# Patient Record
Sex: FEMALE | Race: WHITE | NOT HISPANIC OR LATINO | Employment: UNEMPLOYED | URBAN - METROPOLITAN AREA
[De-identification: names, ages, dates, MRNs, and addresses within clinical notes are randomized per-mention and may not be internally consistent; named-entity substitution may affect disease eponyms.]

---

## 2017-06-05 ENCOUNTER — TELEPHONE (OUTPATIENT)
Dept: OBSTETRICS AND GYNECOLOGY | Facility: CLINIC | Age: 42
End: 2017-06-05

## 2017-06-05 DIAGNOSIS — Z12.31 SCREENING MAMMOGRAM, ENCOUNTER FOR: Primary | ICD-10-CM

## 2017-06-05 NOTE — TELEPHONE ENCOUNTER
Scheduled WWE for 8/24/2014 at [facility], [place] with Dr. Cárdenas at 11:00 AM and a screening MMG for 12:00 PM at [facility], [place]. Pt communicated understanding.

## 2017-06-05 NOTE — TELEPHONE ENCOUNTER
----- Message from Carina Malagon sent at 6/5/2017  1:41 PM CDT -----  Contact: FLORIN VELIZ [[id number]]  x_  1st Request  _  2nd Request  _  3rd Request        Who: FLORIN VELIZ [[id number]]    Why: Pt states she would like to get her MMG orders in     What Number to Call Back: [phone]    When to Expect a call back: (Before the end of the day)   -- if call after 3:00 call back will be tomorrow.

## 2017-08-24 ENCOUNTER — OFFICE VISIT (OUTPATIENT)
Dept: OBSTETRICS AND GYNECOLOGY | Facility: CLINIC | Age: 42
End: 2017-08-24
Attending: OBSTETRICS & GYNECOLOGY
Payer: COMMERCIAL

## 2017-08-24 VITALS
SYSTOLIC BLOOD PRESSURE: 120 MMHG | BODY MASS INDEX: 34.8 KG/M2 | WEIGHT: 184.31 LBS | HEIGHT: 61 IN | DIASTOLIC BLOOD PRESSURE: 76 MMHG

## 2017-08-24 DIAGNOSIS — Z12.31 SCREENING MAMMOGRAM FOR HIGH-RISK PATIENT: ICD-10-CM

## 2017-08-24 DIAGNOSIS — Z01.419 ENCOUNTER FOR GYNECOLOGICAL EXAMINATION WITHOUT ABNORMAL FINDING: Primary | ICD-10-CM

## 2017-08-24 PROCEDURE — 99999 PR PBB SHADOW E&M-EST. PATIENT-LVL III: CPT | Mod: PBBFAC,,, | Performed by: OBSTETRICS & GYNECOLOGY

## 2017-08-24 PROCEDURE — 99396 PREV VISIT EST AGE 40-64: CPT | Mod: S$GLB,,, | Performed by: OBSTETRICS & GYNECOLOGY

## 2017-08-24 NOTE — PROGRESS NOTES
"  Subjective:       Patient ID: Vera De Leon is a 42 y.o. female.    Chief Complaint:  Well Woman      History of Present Illness  HPI    Vera De Leon is a 42 y.o. female  here for her annual GYN exam.    She describes her periods as regular, normal flow, lasting 4 days.   Denies break through bleeding.   Denies vaginal itching or irritation.  Denies vaginal discharge.  She is sexually active. She has had 1 partner for 20 years .  She uses bilateral tubal ligation for contraception.   History of abnormal pap: No  Last Pap: approximate date  and was normal  Last MMG: normal--routine follow-up in 12 months  Last Colonoscopy:  None  Denies domestic violence. She does feel safe at home.     Past Medical History:   Diagnosis Date    Chronic kidney disease     kidney stones     Past Surgical History:   Procedure Laterality Date    Breast reduction Bilateral      SECTION  1999; 3/2005    TUBAL LIGATION  3/2005     Social History     Social History    Marital status:      Spouse name: N/A    Number of children: N/A    Years of education: N/A     Occupational History    Not on file.     Social History Main Topics    Smoking status: Never Smoker    Smokeless tobacco: Never Used    Alcohol use No    Drug use: No      Comment: Tubal ligation    Sexual activity: Yes     Partners: Male     Birth control/ protection: Surgical      Comment:  x 20 years since       Other Topics Concern    Not on file     Social History Narrative    No narrative on file     Family History   Problem Relation Age of Onset    Hypertension Mother     Alzheimer's disease Paternal Grandmother     Breast cancer Neg Hx     Colon cancer Neg Hx     Ovarian cancer Neg Hx     Diabetes Neg Hx      OB History      Para Term  AB Living    2 2 2     2    SAB TAB Ectopic Multiple Live Births            2          /76   Ht 5' 1" (1.549 m)   Wt 83.6 kg (184 lb 4.9 oz)   " LMP 08/15/2017 (Exact Date)   BMI 34.82 kg/m²         GYN & OB History  Patient's last menstrual period was 08/15/2017 (exact date).   Date of Last Pap: 2015    OB History    Para Term  AB Living   2 2 2     2   SAB TAB Ectopic Multiple Live Births           2      # Outcome Date GA Lbr Robert/2nd Weight Sex Delivery Anes PTL Lv   2 Term 05 40w0d  3.374 kg (7 lb 7 oz) F CS-LTranv EPI N CARI   1 Term 99 41w0d  4.082 kg (9 lb) F CS-LTranv EPI N CARI          Review of Systems  Review of Systems   Constitutional: Negative for activity change, appetite change, fatigue and unexpected weight change.   HENT: Negative.    Eyes: Negative for visual disturbance.   Respiratory: Negative for shortness of breath and wheezing.    Cardiovascular: Negative for chest pain, palpitations and leg swelling.   Gastrointestinal: Negative for abdominal pain, bloating and blood in stool.   Endocrine: Negative for diabetes and hair loss.   Genitourinary: Negative for decreased libido, dyspareunia, menorrhagia and menstrual problem.   Musculoskeletal: Negative for back pain and joint swelling.   Skin:  Negative for no acne and hair changes.   Neurological: Negative for headaches.   Hematological: Does not bruise/bleed easily.   Psychiatric/Behavioral: Negative for depression and sleep disturbance. The patient is not nervous/anxious.    Breast: Negative for breast pain and nipple discharge          Objective:    Physical Exam:   Constitutional: She is oriented to person, place, and time. She appears well-developed and well-nourished.    HENT:   Head: Normocephalic and atraumatic.    Eyes: EOM are normal. Pupils are equal, round, and reactive to light.    Neck: Normal range of motion. Neck supple.    Cardiovascular: Normal rate and regular rhythm.     Pulmonary/Chest: Effort normal and breath sounds normal.   BREASTS: Symmetrical, no skin changes or visible lesions.  No palpable masses, nipple discharge bilaterally.           Abdominal: Soft. Bowel sounds are normal.     Genitourinary: Pelvic exam was performed with patient supine.   Genitourinary Comments: PELVIC: Normal external genitalia without lesions.  Normal hair distribution.  Adequate perineal body, normal urethral meatus.  Vagina moist and well rugated without lesions or discharge.  Cervix pink, without lesions, discharge or tenderness.  No significant cystocele or rectocele.  Bimanual exam shows uterus to be normal size, regular, mobile and nontender.  Adnexa without masses or tenderness.               Musculoskeletal: Normal range of motion and moves all extremeties.       Neurological: She is alert and oriented to person, place, and time.    Skin: Skin is warm and dry.    Psychiatric: She has a normal mood and affect.          Assessment:        1. Encounter for gynecological examination without abnormal finding    2. Screening mammogram for high-risk patient                Plan:        1. Encounter for gynecological examination without abnormal finding  COUNSELING:  The patient was counseled today on osteoporosis prevention, calcium supplementation, and regular weight bearing exercise. The patient was also counseled today on ACS PAP guidelines, with recommendations for yearly pelvic exams unless their uterus, cervix, and ovaries were removed for benign reasons; in that case, examinations every 3-5 years are recommended. The patient was also counseled regarding monthly breast self-examination, routine STD screening for at-risk populations, prophylactic immunizations for transmitted infections such as HPV, Pertussis, or Influenza as appropriate, and yearly mammograms when indicated by ACS guidelines. She was advised to see her primary care physician for all other health maintenance.   FOLLOW-UP with me for next routine visit.         2. Screening mammogram for high-risk patient  - Mammo Digital Screening Bilat with Tomosynthesis CAD; Future       Return in about 1 year  (around 8/24/2018).

## 2017-09-05 ENCOUNTER — HOSPITAL ENCOUNTER (OUTPATIENT)
Dept: RADIOLOGY | Facility: HOSPITAL | Age: 42
Discharge: HOME | End: 2017-09-05
Attending: OBSTETRICS & GYNECOLOGY
Payer: COMMERCIAL

## 2017-09-05 VITALS — WEIGHT: 184 LBS | BODY MASS INDEX: 34.74 KG/M2 | HEIGHT: 61 IN

## 2017-09-05 DIAGNOSIS — Z12.31 SCREENING MAMMOGRAM, ENCOUNTER FOR: ICD-10-CM

## 2017-09-05 PROCEDURE — 77067 SCR MAMMO BI INCL CAD: CPT | Mod: TC

## 2017-09-05 PROCEDURE — 77063 BREAST TOMOSYNTHESIS BI: CPT | Mod: 26,,, | Performed by: RADIOLOGY

## 2017-09-05 PROCEDURE — 77067 SCR MAMMO BI INCL CAD: CPT | Mod: 26,,, | Performed by: RADIOLOGY

## 2018-09-06 ENCOUNTER — OFFICE VISIT (OUTPATIENT)
Dept: OBSTETRICS AND GYNECOLOGY | Facility: CLINIC | Age: 43
End: 2018-09-06
Attending: OBSTETRICS & GYNECOLOGY
Payer: COMMERCIAL

## 2018-09-06 ENCOUNTER — HOSPITAL ENCOUNTER (OUTPATIENT)
Dept: RADIOLOGY | Facility: OTHER | Age: 43
Discharge: HOME | End: 2018-09-06
Attending: OBSTETRICS & GYNECOLOGY
Payer: COMMERCIAL

## 2018-09-06 VITALS
BODY MASS INDEX: 33.59 KG/M2 | WEIGHT: 177.94 LBS | SYSTOLIC BLOOD PRESSURE: 118 MMHG | DIASTOLIC BLOOD PRESSURE: 78 MMHG | HEIGHT: 61 IN

## 2018-09-06 DIAGNOSIS — Z12.31 SCREENING MAMMOGRAM, ENCOUNTER FOR: ICD-10-CM

## 2018-09-06 DIAGNOSIS — Z12.4 PAP SMEAR FOR CERVICAL CANCER SCREENING: ICD-10-CM

## 2018-09-06 DIAGNOSIS — Z01.419 ENCOUNTER FOR GYNECOLOGICAL EXAMINATION WITHOUT ABNORMAL FINDING: Primary | ICD-10-CM

## 2018-09-06 DIAGNOSIS — Z12.31 ENCOUNTER FOR SCREENING MAMMOGRAM FOR HIGH-RISK PATIENT: ICD-10-CM

## 2018-09-06 PROCEDURE — 99396 PREV VISIT EST AGE 40-64: CPT | Mod: S$GLB,,, | Performed by: OBSTETRICS & GYNECOLOGY

## 2018-09-06 PROCEDURE — 88175 CYTOPATH C/V AUTO FLUID REDO: CPT

## 2018-09-06 PROCEDURE — 99999 PR PBB SHADOW E&M-EST. PATIENT-LVL III: CPT | Mod: PBBFAC,,, | Performed by: OBSTETRICS & GYNECOLOGY

## 2018-09-06 PROCEDURE — 77067 SCR MAMMO BI INCL CAD: CPT | Mod: TC

## 2018-09-06 PROCEDURE — 77063 BREAST TOMOSYNTHESIS BI: CPT | Mod: 26,,, | Performed by: RADIOLOGY

## 2018-09-06 PROCEDURE — 77067 SCR MAMMO BI INCL CAD: CPT | Mod: 26,,, | Performed by: RADIOLOGY

## 2018-09-06 RX ORDER — FLUTICASONE PROPIONATE 50 MCG
1 SPRAY, SUSPENSION (ML) NASAL DAILY
Refills: 0 | COMMUNITY
Start: 2018-06-03 | End: 2020-03-10 | Stop reason: SDUPTHER

## 2018-09-06 NOTE — PROGRESS NOTES
Subjective:       Patient ID: Vera De Leon is a 43 y.o. female.    Chief Complaint:  Well Woman      History of Present Illness  HPI    Vera De Leon is a 43 y.o. female  here for her annual GYN exam.    She describes her periods as regular, normal flow, lasting 5 days.   denies break through bleeding.   denies vaginal itching or irritation.  Denies vaginal discharge.  She is sexually active. She has had 1 partner for 21 years .  She uses bilateral tubal ligation for contraception.   History of abnormal pap: No  Last Pap: approximate date  and was normal  Last MMG: normal--routine follow-up in 12 months  Last Colonoscopy:  None  denies domestic violence. She does feel safe at home.     Past Medical History:   Diagnosis Date    Chronic kidney disease     kidney stones     Past Surgical History:   Procedure Laterality Date    Breast reduction Bilateral 2002     SECTION  1999; 3/2005    TOTAL REDUCTION MAMMOPLASTY      TUBAL LIGATION  3/2005     Social History     Socioeconomic History    Marital status:      Spouse name: Not on file    Number of children: Not on file    Years of education: Not on file    Highest education level: Not on file   Social Needs    Financial resource strain: Not on file    Food insecurity - worry: Not on file    Food insecurity - inability: Not on file    Transportation needs - medical: Not on file    Transportation needs - non-medical: Not on file   Occupational History    Not on file   Tobacco Use    Smoking status: Never Smoker    Smokeless tobacco: Never Used   Substance and Sexual Activity    Alcohol use: No    Drug use: No     Comment: Tubal ligation    Sexual activity: Yes     Partners: Male     Birth control/protection: Surgical     Comment:  x 20 years since     Other Topics Concern    Not on file   Social History Narrative    Not on file     Family History   Problem Relation Age of Onset    Hypertension  "Mother     Alzheimer's disease Paternal Grandmother     Breast cancer Neg Hx     Colon cancer Neg Hx     Ovarian cancer Neg Hx     Diabetes Neg Hx      OB History      Para Term  AB Living    2 2 2     2    SAB TAB Ectopic Multiple Live Births            2          /78   Ht 5' 1" (1.549 m)   Wt 80.7 kg (177 lb 14.6 oz)   LMP 2018 (Exact Date)   BMI 33.62 kg/m²         GYN & OB History  Patient's last menstrual period was 2018 (exact date).   Date of Last Pap: 2015    OB History    Para Term  AB Living   2 2 2     2   SAB TAB Ectopic Multiple Live Births           2      # Outcome Date GA Lbr Robert/2nd Weight Sex Delivery Anes PTL Lv   2 Term 05 40w0d  3.374 kg (7 lb 7 oz) F CS-LTranv EPI N CARI   1 Term 99 41w0d  4.082 kg (9 lb) F CS-LTranv EPI N CARI          Review of Systems  Review of Systems   Constitutional: Negative for activity change, appetite change, fatigue and unexpected weight change.   HENT: Negative.    Eyes: Negative for visual disturbance.   Respiratory: Negative for shortness of breath and wheezing.    Cardiovascular: Negative for chest pain, palpitations and leg swelling.   Gastrointestinal: Negative for abdominal pain, bloating and blood in stool.   Endocrine: Negative for diabetes and hair loss.   Genitourinary: Negative for decreased libido, dyspareunia, menorrhagia and menstrual problem.   Musculoskeletal: Negative for back pain and joint swelling.   Skin:  Negative for no acne and hair changes.   Neurological: Negative for headaches.   Hematological: Does not bruise/bleed easily.   Psychiatric/Behavioral: Negative for depression and sleep disturbance. The patient is not nervous/anxious.    Breast: Negative for breast pain and nipple discharge          Objective:      Physical Exam:   Constitutional: She is oriented to person, place, and time. She appears well-developed and well-nourished.    HENT:   Head: Normocephalic and " atraumatic.    Eyes: EOM are normal. Pupils are equal, round, and reactive to light.    Neck: Normal range of motion. Neck supple.    Cardiovascular: Normal rate and regular rhythm.     Pulmonary/Chest: Effort normal and breath sounds normal.   BREASTS:  no mass, no tenderness, no deformity and no retraction. Right breast exhibits no inverted nipple, no mass, no nipple discharge, no skin change, no tenderness, no bleeding and no swelling. Left breast exhibits no inverted nipple, no mass, no nipple discharge, no skin change, no tenderness, no bleeding and no swelling. Breasts are symmetrical.    Bilateral reduction scars.          Abdominal: Soft. Bowel sounds are normal.     Genitourinary: Pelvic exam was performed with patient supine.   Genitourinary Comments: PELVIC: Normal external genitalia without lesions.  Normal hair distribution.  Adequate perineal body, normal urethral meatus.  Vagina moist and well rugated without lesions or discharge.  Cervix pink, without lesions, discharge or tenderness.  No significant cystocele or rectocele.  Bimanual exam shows uterus to be normal size, regular, mobile and nontender.  Adnexa without masses or tenderness.               Musculoskeletal: Normal range of motion and moves all extremeties.       Neurological: She is alert and oriented to person, place, and time.    Skin: Skin is warm and dry.    Psychiatric: She has a normal mood and affect.              Assessment:        1. Encounter for gynecological examination without abnormal finding    2. Pap smear for cervical cancer screening    3. Screening mammogram, encounter for                Plan:        1. Encounter for gynecological examination without abnormal finding  COUNSELING:  The patient was counseled today on osteoporosis prevention, calcium supplementation, and regular weight bearing exercise. The patient was also counseled today on ACS PAP guidelines, with recommendations for yearly pelvic exams unless their  uterus, cervix, and ovaries were removed for benign reasons; in that case, examinations every 3-5 years are recommended. The patient was also counseled regarding monthly breast self-examination, routine STD screening for at-risk populations, prophylactic immunizations for transmitted infections such as HPV, Pertussis, or Influenza as appropriate, and yearly mammograms when indicated by ACS guidelines. She was advised to see her primary care physician for all other health maintenance.   FOLLOW-UP with me for next routine visit.         2. Pap smear for cervical cancer screening    - Liquid-based pap smear, screening    3. Screening mammogram, encounter for    - Mammo Digital Screening Bilat with Tomosynthesis CAD; Future       Follow-up in about 1 year (around 9/6/2019).

## 2019-09-26 ENCOUNTER — TELEPHONE (OUTPATIENT)
Dept: OBSTETRICS AND GYNECOLOGY | Facility: CLINIC | Age: 44
End: 2019-09-26

## 2019-09-26 DIAGNOSIS — Z12.31 SCREENING MAMMOGRAM, ENCOUNTER FOR: Primary | ICD-10-CM

## 2019-09-26 NOTE — TELEPHONE ENCOUNTER
mmg orders placed     ----- Message from Toyin Fischer sent at 9/26/2019 11:30 AM CDT -----  Contact: FLORIN VELIZ   Name of Who is Calling: FLORIN VELIZ       What is the request in detail: Patient is requesting orders for her annual mammogram       Can the clinic reply by [facility]: no      What Number to Call Back if not in [facility]: [phone]

## 2019-11-05 ENCOUNTER — OFFICE VISIT (OUTPATIENT)
Dept: OBSTETRICS AND GYNECOLOGY | Facility: CLINIC | Age: 44
End: 2019-11-05
Attending: OBSTETRICS & GYNECOLOGY
Payer: COMMERCIAL

## 2019-11-05 ENCOUNTER — HOSPITAL ENCOUNTER (OUTPATIENT)
Dept: RADIOLOGY | Facility: OTHER | Age: 44
Discharge: HOME | End: 2019-11-05
Attending: OBSTETRICS & GYNECOLOGY
Payer: COMMERCIAL

## 2019-11-05 VITALS
SYSTOLIC BLOOD PRESSURE: 116 MMHG | DIASTOLIC BLOOD PRESSURE: 82 MMHG | WEIGHT: 172.81 LBS | HEIGHT: 61 IN | BODY MASS INDEX: 32.63 KG/M2

## 2019-11-05 DIAGNOSIS — Z12.31 SCREENING MAMMOGRAM, ENCOUNTER FOR: ICD-10-CM

## 2019-11-05 DIAGNOSIS — Z01.419 ENCOUNTER FOR GYNECOLOGICAL EXAMINATION WITHOUT ABNORMAL FINDING: Primary | ICD-10-CM

## 2019-11-05 PROCEDURE — 77063 MAMMO DIGITAL SCREENING BILAT WITH TOMOSYNTHESIS_CAD: ICD-10-PCS | Mod: 26,,, | Performed by: RADIOLOGY

## 2019-11-05 PROCEDURE — 77063 BREAST TOMOSYNTHESIS BI: CPT | Mod: 26,,, | Performed by: RADIOLOGY

## 2019-11-05 PROCEDURE — 99396 PR PREVENTIVE VISIT,EST,40-64: ICD-10-PCS | Mod: S$GLB,,, | Performed by: OBSTETRICS & GYNECOLOGY

## 2019-11-05 PROCEDURE — 99999 PR PBB SHADOW E&M-EST. PATIENT-LVL III: CPT | Mod: PBBFAC,,, | Performed by: OBSTETRICS & GYNECOLOGY

## 2019-11-05 PROCEDURE — 77067 SCR MAMMO BI INCL CAD: CPT | Mod: TC

## 2019-11-05 PROCEDURE — 77067 SCR MAMMO BI INCL CAD: CPT | Mod: 26,,, | Performed by: RADIOLOGY

## 2019-11-05 PROCEDURE — 77067 MAMMO DIGITAL SCREENING BILAT WITH TOMOSYNTHESIS_CAD: ICD-10-PCS | Mod: 26,,, | Performed by: RADIOLOGY

## 2019-11-05 PROCEDURE — 99999 PR PBB SHADOW E&M-EST. PATIENT-LVL III: ICD-10-PCS | Mod: PBBFAC,,, | Performed by: OBSTETRICS & GYNECOLOGY

## 2019-11-05 PROCEDURE — 99396 PREV VISIT EST AGE 40-64: CPT | Mod: S$GLB,,, | Performed by: OBSTETRICS & GYNECOLOGY

## 2019-11-05 NOTE — PROGRESS NOTES
Subjective:       Patient ID: Vera De Leon is a 44 y.o. female.    Chief Complaint:  Well Woman      History of Present Illness  HPI    Vera De Leon is a 44 y.o. female  here for her annual GYN exam.    She describes her periods as regular, normal flow, lasting 5 days.   denies break through bleeding.   denies vaginal itching or irritation.  Denies vaginal discharge.  She is sexually active. She has had 1 partner for 22 years .  She uses bilateral tubal ligation for contraception.   History of abnormal pap: No  Last Pap: approximate date  and was normal  Last MMG: normal--routine follow-up in 12 months  Last Colonoscopy:  None  denies domestic violence. She does feel safe at home.     Past Medical History:   Diagnosis Date    Chronic kidney disease     kidney stones     Past Surgical History:   Procedure Laterality Date    Breast reduction Bilateral 2002     SECTION  1999; 3/2005    TOTAL REDUCTION MAMMOPLASTY      TUBAL LIGATION  3/2005     Social History     Socioeconomic History    Marital status:      Spouse name: Not on file    Number of children: Not on file    Years of education: Not on file    Highest education level: Not on file   Occupational History    Not on file   Social Needs    Financial resource strain: Not on file    Food insecurity:     Worry: Not on file     Inability: Not on file    Transportation needs:     Medical: Not on file     Non-medical: Not on file   Tobacco Use    Smoking status: Never Smoker    Smokeless tobacco: Never Used   Substance and Sexual Activity    Alcohol use: No    Drug use: No     Comment: Tubal ligation    Sexual activity: Yes     Partners: Male     Birth control/protection: Surgical     Comment:  x 22 years since     Lifestyle    Physical activity:     Days per week: Not on file     Minutes per session: Not on file    Stress: Not on file   Relationships    Social connections:     Talks on phone:  "Not on file     Gets together: Not on file     Attends [employer] service: Not on file     Active member of club or organization: Not on file     Attends meetings of clubs or organizations: Not on file     Relationship status: Not on file   Other Topics Concern    Not on file   Social History Narrative    Not on file     Family History   Problem Relation Age of Onset    Hypertension Mother     Alzheimer's disease Paternal Grandmother     Breast cancer Neg Hx     Colon cancer Neg Hx     Ovarian cancer Neg Hx     Diabetes Neg Hx      OB History        2    Para   2    Term   2            AB        Living   2       SAB        TAB        Ectopic        Multiple        Live Births   2                 /82   Ht 5' 1" (1.549 m)   Wt 78.4 kg (172 lb 13.5 oz)   LMP 10/21/2019   BMI 32.66 kg/m²         GYN & OB History  Patient's last menstrual period was 10/21/2019.   Date of Last Pap: 9/10/2018    OB History    Para Term  AB Living   2 2 2     2   SAB TAB Ectopic Multiple Live Births           2      # Outcome Date GA Lbr Robert/2nd Weight Sex Delivery Anes PTL Lv   2 Term 05 40w0d  3.374 kg (7 lb 7 oz) F CS-LTranv EPI N CARI   1 Term 99 41w0d  4.082 kg (9 lb) F CS-LTranv EPI N CARI       Review of Systems  Review of Systems   Constitutional: Negative for activity change, appetite change, fatigue and unexpected weight change.   HENT: Negative.    Eyes: Negative for visual disturbance.   Respiratory: Negative for shortness of breath and wheezing.    Cardiovascular: Negative for chest pain, palpitations and leg swelling.   Gastrointestinal: Negative for abdominal pain, bloating and blood in stool.   Endocrine: Negative for diabetes, hair loss and hot flashes.   Genitourinary: Negative for decreased libido, dyspareunia, hot flashes, menorrhagia and menstrual problem.   Musculoskeletal: Negative for back pain and joint swelling.   Integumentary:  Negative for acne, hair changes " and nipple discharge.   Neurological: Negative for headaches.   Hematological: Does not bruise/bleed easily.   Psychiatric/Behavioral: Negative for depression and sleep disturbance. The patient is not nervous/anxious.    Breast: Negative for mastodynia and nipple discharge          Objective:      Physical Exam:   Constitutional: She is oriented to person, place, and time. She appears well-developed and well-nourished.    HENT:   Head: Normocephalic and atraumatic.    Eyes: Pupils are equal, round, and reactive to light. EOM are normal.    Neck: Normal range of motion. Neck supple.    Cardiovascular: Normal rate and regular rhythm.     Pulmonary/Chest: Effort normal and breath sounds normal.   BREASTS:  no mass, no tenderness, no deformity and no retraction. Right breast exhibits no inverted nipple, no mass, no nipple discharge, no skin change, no tenderness, no bleeding and no swelling. Left breast exhibits no inverted nipple, no mass, no nipple discharge, no skin change, no tenderness, no bleeding and no swelling. Breasts are symmetrical.      Bilateral reduction scars        Abdominal: Soft. Bowel sounds are normal.     Genitourinary: Pelvic exam was performed with patient supine.   Genitourinary Comments: PELVIC: Normal external genitalia without lesions.  Normal hair distribution.  Adequate perineal body, normal urethral meatus.  Vagina moist and well rugated without lesions or discharge.  Cervix pink, without lesions, discharge or tenderness.  No significant cystocele or rectocele.  Bimanual exam shows uterus to be normal size, regular, mobile and nontender.  Adnexa without masses or tenderness.               Musculoskeletal: Normal range of motion and moves all extremeties.       Neurological: She is alert and oriented to person, place, and time.    Skin: Skin is warm and dry.    Psychiatric: She has a normal mood and affect.              Assessment:        1. Encounter for gynecological examination without  abnormal finding    2. Screening mammogram, encounter for                Plan:        1. Encounter for gynecological examination without abnormal finding  COUNSELING:  The patient was counseled today on regular weight bearing exercise. Patient was counseled today on the new ACS guidelines for cervical cytology screening as well as the current recommendations for breast cancer screening. Counseling session lasted approximately 10 minutes, and all her questions were answered. She was advised to see her primary care physician for all other health maintenance.   FOLLOW-UP with me for next routine visit.         2. Screening mammogram, encounter for      - Mammo Digital Screening Bilat w/ [facility]; Future       Follow up in about 1 year (around 11/5/2020).

## 2019-11-06 ENCOUNTER — TELEPHONE (OUTPATIENT)
Dept: OBSTETRICS AND GYNECOLOGY | Facility: CLINIC | Age: 44
End: 2019-11-06

## 2019-11-06 NOTE — TELEPHONE ENCOUNTER
----- Message from Carina Macedo sent at 11/6/2019  8:10 AM CST -----  Contact: FLORIN VELIZ [[id number]]  Name of Who is Calling: FLORIN VELIZ [[id number]]      What is the request in detail: patient would like to speak with nurse regarding orders for mammogram     Can the clinic reply by [facility]: no    What Number to Call Back if not in [facility]: [phone]

## 2019-11-06 NOTE — TELEPHONE ENCOUNTER
Attempted to contact patient regarding additional imaging due to mammogram results. Phone busy. No voicemail.

## 2019-11-07 ENCOUNTER — HOSPITAL ENCOUNTER (OUTPATIENT)
Dept: RADIOLOGY | Facility: HOSPITAL | Age: 44
Discharge: HOME | End: 2019-11-07
Attending: OBSTETRICS & GYNECOLOGY
Payer: COMMERCIAL

## 2019-11-07 DIAGNOSIS — R92.8 ABNORMAL MAMMOGRAM: ICD-10-CM

## 2019-11-07 PROCEDURE — 77065 DX MAMMO INCL CAD UNI: CPT | Mod: TC,PO,LT

## 2019-11-07 PROCEDURE — 77061 MAMMO DIGITAL DIAGNOSTIC LEFT WITH TOMOSYNTHESIS_CAD: ICD-10-PCS | Mod: 26,LT,, | Performed by: RADIOLOGY

## 2019-11-07 PROCEDURE — 77061 BREAST TOMOSYNTHESIS UNI: CPT | Mod: TC,PO,LT

## 2019-11-07 PROCEDURE — 77065 DX MAMMO INCL CAD UNI: CPT | Mod: 26,LT,, | Performed by: RADIOLOGY

## 2019-11-07 PROCEDURE — 77065 MAMMO DIGITAL DIAGNOSTIC LEFT WITH TOMOSYNTHESIS_CAD: ICD-10-PCS | Mod: 26,LT,, | Performed by: RADIOLOGY

## 2019-11-07 PROCEDURE — 77061 BREAST TOMOSYNTHESIS UNI: CPT | Mod: 26,LT,, | Performed by: RADIOLOGY

## 2020-03-10 ENCOUNTER — OFFICE VISIT (OUTPATIENT)
Dept: FAMILY MEDICINE | Facility: CLINIC | Age: 45
End: 2020-03-10
Payer: COMMERCIAL

## 2020-03-10 ENCOUNTER — TELEPHONE (OUTPATIENT)
Dept: FAMILY MEDICINE | Facility: CLINIC | Age: 45
End: 2020-03-10

## 2020-03-10 VITALS
DIASTOLIC BLOOD PRESSURE: 80 MMHG | TEMPERATURE: 99 F | HEART RATE: 84 BPM | OXYGEN SATURATION: 99 % | BODY MASS INDEX: 32.76 KG/M2 | WEIGHT: 173.5 LBS | HEIGHT: 61 IN | SYSTOLIC BLOOD PRESSURE: 136 MMHG

## 2020-03-10 DIAGNOSIS — J30.1 SEASONAL ALLERGIC RHINITIS DUE TO POLLEN: ICD-10-CM

## 2020-03-10 DIAGNOSIS — J45.20 MILD INTERMITTENT ASTHMA WITHOUT COMPLICATION: Primary | ICD-10-CM

## 2020-03-10 DIAGNOSIS — Z00.00 WELLNESS EXAMINATION: Primary | ICD-10-CM

## 2020-03-10 DIAGNOSIS — J45.20 MILD INTERMITTENT ASTHMA WITHOUT COMPLICATION: ICD-10-CM

## 2020-03-10 DIAGNOSIS — H69.92 ET (EUSTACHIAN TUBE DISORDER), LEFT: ICD-10-CM

## 2020-03-10 PROBLEM — T78.40XA ALLERGY: Status: RESOLVED | Noted: 2020-03-10 | Resolved: 2020-03-10

## 2020-03-10 PROBLEM — J45.909 ASTHMA: Status: ACTIVE | Noted: 2020-03-10

## 2020-03-10 PROBLEM — T78.40XA ALLERGY: Status: ACTIVE | Noted: 2020-03-10

## 2020-03-10 PROCEDURE — 3008F BODY MASS INDEX DOCD: CPT | Mod: CPTII,S$GLB,, | Performed by: FAMILY MEDICINE

## 2020-03-10 PROCEDURE — 99999 PR PBB SHADOW E&M-EST. PATIENT-LVL III: CPT | Mod: PBBFAC,,, | Performed by: FAMILY MEDICINE

## 2020-03-10 PROCEDURE — 99386 PREV VISIT NEW AGE 40-64: CPT | Mod: S$GLB,,, | Performed by: FAMILY MEDICINE

## 2020-03-10 PROCEDURE — 3008F PR BODY MASS INDEX (BMI) DOCUMENTED: ICD-10-PCS | Mod: CPTII,S$GLB,, | Performed by: FAMILY MEDICINE

## 2020-03-10 PROCEDURE — 99999 PR PBB SHADOW E&M-EST. PATIENT-LVL III: ICD-10-PCS | Mod: PBBFAC,,, | Performed by: FAMILY MEDICINE

## 2020-03-10 PROCEDURE — 99386 PR PREVENTIVE VISIT,NEW,40-64: ICD-10-PCS | Mod: S$GLB,,, | Performed by: FAMILY MEDICINE

## 2020-03-10 RX ORDER — FLUTICASONE PROPIONATE 50 MCG
2 SPRAY, SUSPENSION (ML) NASAL DAILY
Qty: 16 G | Refills: 11 | Status: SHIPPED | OUTPATIENT
Start: 2020-03-10 | End: 2021-03-12

## 2020-03-10 RX ORDER — ALBUTEROL SULFATE 90 UG/1
2 AEROSOL, METERED RESPIRATORY (INHALATION) EVERY 6 HOURS PRN
Qty: 18 G | Refills: 5 | Status: SHIPPED | OUTPATIENT
Start: 2020-03-10 | End: 2022-03-30 | Stop reason: SDUPTHER

## 2020-03-10 RX ORDER — METHYLPREDNISOLONE 4 MG/1
TABLET ORAL
Qty: 1 PACKAGE | Refills: 0 | Status: SHIPPED | OUTPATIENT
Start: 2020-03-10 | End: 2020-03-31

## 2020-03-10 RX ORDER — ALBUTEROL SULFATE 0.63 MG/3ML
0.63 SOLUTION RESPIRATORY (INHALATION) EVERY 6 HOURS PRN
Qty: 1 BOX | Refills: 5 | Status: SHIPPED | OUTPATIENT
Start: 2020-03-10 | End: 2021-03-10

## 2020-03-10 NOTE — TELEPHONE ENCOUNTER
Order sent.  When I ordered during her visit, [facility] indicated that the [facility] is preferred on her insurance plan.

## 2020-03-10 NOTE — TELEPHONE ENCOUNTER
----- Message from Alma Cox sent at 3/10/2020  4:36 PM CDT -----  Contact: Pt  Type:  Patient Returning Call    Who Called:  Renee Sullivan [facility]  Does the patient know what this is regarding?:  Med prescription  Best Call Back Number:   Additional Information:  Please give call back as soon as possible. Thank you

## 2020-03-10 NOTE — PROGRESS NOTES
"Subjective:       Patient ID: Vera De Leon is a 45 y.o. female.    Chief Complaint: Annual Exam (pt c/o sinus problem headaches pressure face)    This pt is new to me and to this clinic.  The pt presents for annual wellness exam.  The pt is followed for asthma that is allergy mediated.  She only rarely needs her albuterol inhaler.  She has some allergies to foods and pollen as well.  She has a nebulizer and only needs it about once a year.  The pt is followed annually by her GYN.  [facility]    Review of Systems   Constitutional: Negative for activity change, appetite change, fatigue and unexpected weight change.   HENT: Positive for congestion and tinnitus (left ear--? related to her sinus/allergy issues).    Eyes: Negative for visual disturbance.   Respiratory: Negative for cough, chest tightness and shortness of breath.    Cardiovascular: Negative for chest pain, palpitations and leg swelling.   Gastrointestinal: Negative for abdominal pain, constipation, diarrhea, nausea and vomiting.   Endocrine: Negative for cold intolerance, heat intolerance and polyuria.   Genitourinary: Negative for decreased urine volume and dysuria.   Musculoskeletal: Negative for arthralgias and back pain.   Skin: Negative for rash.   Neurological: Negative for numbness and headaches.       Objective:       Vitals:    03/10/20 1509   BP: 136/80   BP Location: Right arm   Patient Position: Sitting   BP Method: Large (Manual)   Pulse: 84   Temp: 99.4 °F (37.4 °C)   TempSrc: Oral   SpO2: 99%   Weight: 78.7 kg (173 lb 8 oz)   Height: 5' 1" (1.549 m)     Physical Exam   Constitutional: She is oriented to person, place, and time. She appears well-developed and well-nourished.   HENT:   Head: Normocephalic.   Boggy nasal mucosa   Eyes: Pupils are equal, round, and reactive to light. Conjunctivae and EOM are normal.   Neck: Normal range of motion. Neck supple. No thyromegaly present.   Cardiovascular: " Normal rate, regular rhythm and normal heart sounds.   Pulmonary/Chest: Effort normal and breath sounds normal.   Abdominal: Soft. Bowel sounds are normal. There is no tenderness.   Musculoskeletal: Normal range of motion. She exhibits no tenderness or deformity.   Lymphadenopathy:     She has no cervical adenopathy.   Neurological: She is alert and oriented to person, place, and time. She displays normal reflexes. No cranial nerve deficit. She exhibits normal muscle tone. Coordination normal.   Skin: Skin is warm and dry.   Psychiatric: She has a normal mood and affect. Her behavior is normal.       Assessment:       1. Wellness examination    2. Mild intermittent asthma without complication    3. Seasonal allergic rhinitis due to pollen    4. Et (Eustachian tube disorder), left        Plan:       Vera was seen today for annual exam.    Diagnoses and all orders for this visit:    Wellness examination  -     CBC auto differential; Future  -     Comprehensive metabolic panel; Future  -     Lipid panel; Future  -     TSH; Future  -     Vitamin D; Future    Mild intermittent asthma without complication  -     albuterol sulfate (PROAIR RESPICLICK) 90 mcg/actuation AePB; Inhale 2 puffs into the lungs every 4 to 6 hours as needed. Rescue  -     albuterol (ACCUNEB) 0.63 mg/3 mL Nebu; Take 3 mLs (0.63 mg total) by nebulization every 6 (six) hours as needed. Rescue    Seasonal allergic rhinitis due to pollen  -     fluticasone propionate (FLONASE) 50 mcg/actuation nasal spray; 2 sprays (100 mcg total) by Each Nostril route once daily.  -     methylPREDNISolone (MEDROL DOSEPACK) 4 mg tablet; use as directed    Et (Eustachian tube disorder), left  -     methylPREDNISolone (MEDROL DOSEPACK) 4 mg tablet; use as directed      During this visit, I reviewed the pt's history, medications, allergies, and problem list.

## 2020-03-11 ENCOUNTER — PATIENT MESSAGE (OUTPATIENT)
Dept: FAMILY MEDICINE | Facility: CLINIC | Age: 45
End: 2020-03-11

## 2020-03-11 ENCOUNTER — LAB VISIT (OUTPATIENT)
Dept: LAB | Facility: HOSPITAL | Age: 45
End: 2020-03-11
Attending: FAMILY MEDICINE
Payer: COMMERCIAL

## 2020-03-11 DIAGNOSIS — Z00.00 WELLNESS EXAMINATION: ICD-10-CM

## 2020-03-11 LAB
25(OH)D3+25(OH)D2 SERPL-MCNC: 36 NG/ML (ref 30–96)
ALBUMIN SERPL BCP-MCNC: 3.9 G/DL (ref 3.5–5.2)
ALP SERPL-CCNC: 67 U/L (ref 55–135)
ALT SERPL W/O P-5'-P-CCNC: 22 U/L (ref 10–44)
ANION GAP SERPL CALC-SCNC: 11 MMOL/L (ref 8–16)
AST SERPL-CCNC: 28 U/L (ref 10–40)
BASOPHILS # BLD AUTO: 0.04 K/UL (ref 0–0.2)
BASOPHILS NFR BLD: 0.6 % (ref 0–1.9)
BILIRUB SERPL-MCNC: 0.3 MG/DL (ref 0.1–1)
BUN SERPL-MCNC: 8 MG/DL (ref 6–20)
CALCIUM SERPL-MCNC: 9.3 MG/DL (ref 8.7–10.5)
CHLORIDE SERPL-SCNC: 106 MMOL/L (ref 95–110)
CHOLEST SERPL-MCNC: 266 MG/DL (ref 120–199)
CHOLEST/HDLC SERPL: 4.1 {RATIO} (ref 2–5)
CO2 SERPL-SCNC: 23 MMOL/L (ref 23–29)
CREAT SERPL-MCNC: 0.9 MG/DL (ref 0.5–1.4)
DIFFERENTIAL METHOD: NORMAL
EOSINOPHIL # BLD AUTO: 0.5 K/UL (ref 0–0.5)
EOSINOPHIL NFR BLD: 6.7 % (ref 0–8)
ERYTHROCYTE [DISTWIDTH] IN BLOOD BY AUTOMATED COUNT: 13.2 % (ref 11.5–14.5)
EST. GFR  (AFRICAN AMERICAN): >60 ML/MIN/1.73 M^2
EST. GFR  (NON AFRICAN AMERICAN): >60 ML/MIN/1.73 M^2
GLUCOSE SERPL-MCNC: 91 MG/DL (ref 70–110)
HCT VFR BLD AUTO: 40.3 % (ref 37–48.5)
HDLC SERPL-MCNC: 65 MG/DL (ref 40–75)
HDLC SERPL: 24.4 % (ref 20–50)
HGB BLD-MCNC: 12.9 G/DL (ref 12–16)
LDLC SERPL CALC-MCNC: 180.6 MG/DL (ref 63–159)
LYMPHOCYTES # BLD AUTO: 2.1 K/UL (ref 1–4.8)
LYMPHOCYTES NFR BLD: 31.3 % (ref 18–48)
MCH RBC QN AUTO: 27.4 PG (ref 27–31)
MCHC RBC AUTO-ENTMCNC: 32 G/DL (ref 32–36)
MCV RBC AUTO: 86 FL (ref 82–98)
MONOCYTES # BLD AUTO: 0.4 K/UL (ref 0.3–1)
MONOCYTES NFR BLD: 5.5 % (ref 4–15)
NEUTROPHILS # BLD AUTO: 3.8 K/UL (ref 1.8–7.7)
NEUTROPHILS NFR BLD: 55.9 % (ref 38–73)
NONHDLC SERPL-MCNC: 201 MG/DL
PLATELET # BLD AUTO: 313 K/UL (ref 150–350)
PMV BLD AUTO: 10.1 FL (ref 9.2–12.9)
POTASSIUM SERPL-SCNC: 5 MMOL/L (ref 3.5–5.1)
PROT SERPL-MCNC: 7.6 G/DL (ref 6–8.4)
RBC # BLD AUTO: 4.71 M/UL (ref 4–5.4)
SODIUM SERPL-SCNC: 140 MMOL/L (ref 136–145)
TRIGL SERPL-MCNC: 102 MG/DL (ref 30–150)
TSH SERPL DL<=0.005 MIU/L-ACNC: 2.21 UIU/ML (ref 0.4–4)
WBC # BLD AUTO: 6.74 K/UL (ref 3.9–12.7)

## 2020-03-11 PROCEDURE — 85025 COMPLETE CBC W/AUTO DIFF WBC: CPT | Mod: PO

## 2020-03-11 PROCEDURE — 80061 LIPID PANEL: CPT

## 2020-03-11 PROCEDURE — 80053 COMPREHEN METABOLIC PANEL: CPT

## 2020-03-11 PROCEDURE — 84443 ASSAY THYROID STIM HORMONE: CPT

## 2020-03-11 PROCEDURE — 82306 VITAMIN D 25 HYDROXY: CPT

## 2020-03-11 PROCEDURE — 36415 COLL VENOUS BLD VENIPUNCTURE: CPT | Mod: PO

## 2020-03-12 ENCOUNTER — PATIENT MESSAGE (OUTPATIENT)
Dept: FAMILY MEDICINE | Facility: CLINIC | Age: 45
End: 2020-03-12

## 2020-03-12 DIAGNOSIS — E78.2 MIXED HYPERLIPIDEMIA: Primary | ICD-10-CM

## 2020-03-13 ENCOUNTER — PATIENT MESSAGE (OUTPATIENT)
Dept: FAMILY MEDICINE | Facility: CLINIC | Age: 45
End: 2020-03-13

## 2020-06-15 ENCOUNTER — LAB VISIT (OUTPATIENT)
Dept: LAB | Facility: HOSPITAL | Age: 45
End: 2020-06-15
Attending: FAMILY MEDICINE
Payer: COMMERCIAL

## 2020-06-15 DIAGNOSIS — E78.2 MIXED HYPERLIPIDEMIA: ICD-10-CM

## 2020-06-15 LAB
CHOLEST SERPL-MCNC: 239 MG/DL (ref 120–199)
CHOLEST/HDLC SERPL: 4.1 {RATIO} (ref 2–5)
HDLC SERPL-MCNC: 59 MG/DL (ref 40–75)
HDLC SERPL: 24.7 % (ref 20–50)
LDLC SERPL CALC-MCNC: 152.2 MG/DL (ref 63–159)
NONHDLC SERPL-MCNC: 180 MG/DL
TRIGL SERPL-MCNC: 139 MG/DL (ref 30–150)

## 2020-06-15 PROCEDURE — 36415 COLL VENOUS BLD VENIPUNCTURE: CPT | Mod: PO

## 2020-06-15 PROCEDURE — 80061 LIPID PANEL: CPT

## 2020-11-03 ENCOUNTER — TELEPHONE (OUTPATIENT)
Dept: OBSTETRICS AND GYNECOLOGY | Facility: CLINIC | Age: 45
End: 2020-11-03

## 2020-11-03 DIAGNOSIS — Z12.31 SCREENING MAMMOGRAM, ENCOUNTER FOR: Primary | ICD-10-CM

## 2020-11-03 NOTE — TELEPHONE ENCOUNTER
----- Message from Bonnie Franco, Patient Care Assistant sent at 11/3/2020  3:50 PM CST -----  Name of Who is Calling: FLORIN VELIZ [[id number]]    What is the request in detail: Requesting a call back in regards of getting mammogram orders.  Please contact to further discuss and advise      Can the clinic reply by [facility]: No    What Number to Call Back [facility]:   [phone]

## 2020-12-15 ENCOUNTER — HOSPITAL ENCOUNTER (OUTPATIENT)
Dept: RADIOLOGY | Facility: OTHER | Age: 45
Discharge: HOME | End: 2020-12-15
Attending: OBSTETRICS & GYNECOLOGY
Payer: COMMERCIAL

## 2020-12-15 ENCOUNTER — OFFICE VISIT (OUTPATIENT)
Dept: OBSTETRICS AND GYNECOLOGY | Facility: CLINIC | Age: 45
End: 2020-12-15
Attending: OBSTETRICS & GYNECOLOGY
Payer: COMMERCIAL

## 2020-12-15 VITALS
HEIGHT: 61 IN | DIASTOLIC BLOOD PRESSURE: 78 MMHG | BODY MASS INDEX: 32.94 KG/M2 | SYSTOLIC BLOOD PRESSURE: 130 MMHG | WEIGHT: 174.5 LBS

## 2020-12-15 DIAGNOSIS — Z01.419 ENCOUNTER FOR GYNECOLOGICAL EXAMINATION WITHOUT ABNORMAL FINDING: Primary | ICD-10-CM

## 2020-12-15 DIAGNOSIS — N92.6 MENSTRUAL DISORDER: ICD-10-CM

## 2020-12-15 DIAGNOSIS — Z12.31 SCREENING MAMMOGRAM, ENCOUNTER FOR: ICD-10-CM

## 2020-12-15 PROCEDURE — 1126F AMNT PAIN NOTED NONE PRSNT: CPT | Mod: S$GLB,,, | Performed by: OBSTETRICS & GYNECOLOGY

## 2020-12-15 PROCEDURE — 3008F BODY MASS INDEX DOCD: CPT | Mod: CPTII,S$GLB,, | Performed by: OBSTETRICS & GYNECOLOGY

## 2020-12-15 PROCEDURE — 77067 SCR MAMMO BI INCL CAD: CPT | Mod: TC

## 2020-12-15 PROCEDURE — 99396 PR PREVENTIVE VISIT,EST,40-64: ICD-10-PCS | Mod: S$GLB,,, | Performed by: OBSTETRICS & GYNECOLOGY

## 2020-12-15 PROCEDURE — 99396 PREV VISIT EST AGE 40-64: CPT | Mod: S$GLB,,, | Performed by: OBSTETRICS & GYNECOLOGY

## 2020-12-15 PROCEDURE — 77063 BREAST TOMOSYNTHESIS BI: CPT | Mod: 26,,, | Performed by: RADIOLOGY

## 2020-12-15 PROCEDURE — 1126F PR PAIN SEVERITY QUANTIFIED, NO PAIN PRESENT: ICD-10-PCS | Mod: S$GLB,,, | Performed by: OBSTETRICS & GYNECOLOGY

## 2020-12-15 PROCEDURE — 3008F PR BODY MASS INDEX (BMI) DOCUMENTED: ICD-10-PCS | Mod: CPTII,S$GLB,, | Performed by: OBSTETRICS & GYNECOLOGY

## 2020-12-15 PROCEDURE — 77067 MAMMO DIGITAL SCREENING BILAT WITH TOMO: ICD-10-PCS | Mod: 26,,, | Performed by: RADIOLOGY

## 2020-12-15 PROCEDURE — 77063 MAMMO DIGITAL SCREENING BILAT WITH TOMO: ICD-10-PCS | Mod: 26,,, | Performed by: RADIOLOGY

## 2020-12-15 PROCEDURE — 77067 SCR MAMMO BI INCL CAD: CPT | Mod: 26,,, | Performed by: RADIOLOGY

## 2020-12-15 NOTE — PROGRESS NOTES
Subjective:       Patient ID: Vera De Leon is a 45 y.o. female.    Chief Complaint:  Annual Exam      History of Present Illness  HPI    Vera De Leon is a 45 y.o. female  here for her annual GYN exam.    She describes her periods as irregular, during the past 6 months, normal, heavy flow, lasting 7 days. (Had 2 cycles in February, July and September, previously normal once monthly).      denies vaginal itching or irritation.  Denies vaginal discharge.  She is sexually active. She has had 1 partner for the past 23 years .  She uses bilateral tubal ligation for contraception.   History of abnormal pap: No  Last Pap: approximate date 2018 and was normal  Last MMG: normal-2019-routine follow-up in 12 months  Last Colonoscopy:  None  denies domestic violence. She does feel safe at home.     Past Medical History:   Diagnosis Date    Allergy     Asthma     Chronic kidney disease     kidney stones     Past Surgical History:   Procedure Laterality Date    Breast reduction Bilateral      SECTION  1999; 3/2005    TOTAL REDUCTION MAMMOPLASTY          TUBAL LIGATION  3/2005     Social History     Socioeconomic History    Marital status:      Spouse name: Not on file    Number of children: Not on file    Years of education: Not on file    Highest education level: Not on file   Occupational History    Not on file   Social Needs    Financial resource strain: Not on file    Food insecurity     Worry: Not on file     Inability: Not on file    Transportation needs     Medical: Not on file     Non-medical: Not on file   Tobacco Use    Smoking status: Never Smoker    Smokeless tobacco: Never Used   Substance and Sexual Activity    Alcohol use: No    Drug use: No     Comment: Tubal ligation    Sexual activity: Yes     Partners: Male     Birth control/protection: Surgical     Comment:  x 23 years since     Lifestyle    Physical activity      "Days per week: Not on file     Minutes per session: Not on file    Stress: Only a little   Relationships    Social connections     Talks on phone: Not on file     Gets together: Not on file     Attends Gnosticist service: Not on file     Active member of club or organization: Not on file     Attends meetings of clubs or organizations: Not on file     Relationship status: Not on file   Other Topics Concern    Not on file   Social History Narrative    Not on file     Family History   Problem Relation Age of Onset    Hypertension Mother     Alzheimer's disease Paternal Grandmother     Breast cancer Neg Hx     Colon cancer Neg Hx     Ovarian cancer Neg Hx     Diabetes Neg Hx      OB History        2    Para   2    Term   2            AB        Living   2       SAB        TAB        Ectopic        Multiple        Live Births   2                 /78   Ht 5' 1" (1.549 m)   Wt 79.2 kg (174 lb 7.9 oz)   LMP 2020   BMI 32.97 kg/m²         GYN & OB History  Patient's last menstrual period was 2020.   Date of Last Pap: No result found    OB History    Para Term  AB Living   2 2 2     2   SAB TAB Ectopic Multiple Live Births           2      # Outcome Date GA Lbr Robert/2nd Weight Sex Delivery Anes PTL Lv   2 Term 05 40w0d  3.374 kg (7 lb 7 oz) F CS-LTranv EPI N CARI   1 Term 99 41w0d  4.082 kg (9 lb) F CS-LTranv EPI N CARI       Review of Systems  Review of Systems   Constitutional: Negative for activity change, appetite change, fatigue and unexpected weight change.   HENT: Negative.    Eyes: Negative for visual disturbance.   Respiratory: Negative for shortness of breath and wheezing.    Cardiovascular: Negative for chest pain, palpitations and leg swelling.   Gastrointestinal: Negative for abdominal pain, bloating and blood in stool.   Endocrine: Positive for hair loss. Negative for diabetes and hot flashes.   Genitourinary: Positive for menorrhagia, menstrual " problem and vaginal dryness. Negative for decreased libido and dyspareunia.   Musculoskeletal: Negative for back pain and joint swelling.   Integumentary:  Positive for hair changes. Negative for acne and nipple discharge.   Neurological: Negative for headaches.   Hematological: Does not bruise/bleed easily.   Psychiatric/Behavioral: Negative for depression and sleep disturbance. The patient is not nervous/anxious.    Breast: Negative for mastodynia and nipple discharge          Objective:      Physical Exam:   Constitutional: She is oriented to person, place, and time. She appears well-developed and well-nourished.    HENT:   Head: Normocephalic and atraumatic.    Eyes: Pupils are equal, round, and reactive to light. EOM are normal.    Neck: Normal range of motion. Neck supple.    Cardiovascular: Normal rate and regular rhythm.     Pulmonary/Chest: Effort normal and breath sounds normal.   BREASTS:  no mass, no tenderness, no deformity and no retraction. Right breast exhibits no inverted nipple, no mass, no nipple discharge, no skin change, no tenderness, no bleeding and no swelling. Left breast exhibits no inverted nipple, no mass, no nipple discharge, no skin change, no tenderness, no bleeding and no swelling. Breasts are symmetrical.      Bilateral reduction scars        Abdominal: Soft. Bowel sounds are normal.     Genitourinary:    Pelvic exam was performed with patient supine.      Genitourinary Comments: PELVIC: Normal external genitalia without lesions.  Normal hair distribution.  Adequate perineal body, normal urethral meatus.  Vagina moist and well rugated without lesions or discharge.  Cervix pink, without lesions, discharge or tenderness.  No significant cystocele or rectocele.  Bimanual exam shows uterus to be normal size, regular, mobile and nontender.  Adnexa without masses or tenderness.                 Musculoskeletal: Normal range of motion and moves all extremeties.       Neurological: She is  alert and oriented to person, place, and time.    Skin: Skin is warm and dry.    Psychiatric: She has a normal mood and affect.              Assessment:        1. Encounter for gynecological examination without abnormal finding    2. Menstrual disorder                Plan:        1. Encounter for gynecological examination without abnormal finding      2. Menstrual disorder  (Discussed perimenopause)  - US Pelvis Comp with Transvag NON-OB (xpd; Future  - Follicle Stimulating Hormone; Future  - Estradiol; Future  - TSH; Future  - T4, Free; Future       Follow up in about 1 year (around 12/15/2021).

## 2020-12-15 NOTE — PATIENT INSTRUCTIONS
Perimenopause  Menopause is when you stop having periods for good. For many women, this happens around age 50. The time of change before this is called perimenopause. It may start in your late 30s or 40s. It can last for months or years. During this time, your body makes lower levels of female hormones. This causes certain changes in your body. You may already have begun to feel the effects of these changes. By taking steps to relieve symptoms, you can still feel good.    The menstrual cycle  Hormones are chemicals that have specific jobs in the body. A menstrual cycle is caused by changes in the levels of 2 female hormones. These hormones are estrogen and progesterone. They are made by the ovaries. In a normal cycle, estrogen creates a lining in the uterus to allow for pregnancy. The ovary then releases an egg. This is called ovulation. Progesterone levels start to go up. If the egg is not fertilized, progesterone levels go down. This causes the lining in the uterus to be shed. This is the bleeding that is your period.  Changes in hormones  As a woman ages, her ovaries begin to make hormones less regularly. In some months, there may not be ovulation. Without ovulation, progesterone isn't secreted so a normal period doesn't occur. The menstrual cycle will be harder to predict. Over time, the ovaries stop working. This can cause symptoms. Some women who have had their uterus taken out (hysterectomy) but still have ovaries may still have symptoms. When estrogen levels reach their lowest point, periods will stop completely. This is menopause.  Symptoms of perimenopause  The change in hormones can cause physical symptoms. It can also cause emotional symptoms. These may include:  · Periods that come more or less often  · Periods that are lighter or heavier than youre used to  · Hot flashes, night sweats, or trouble sleeping  · Vaginal dryness, which may make sex painful  · Mood swings or  fatigue  · Palpitations  · Sleep disturbances  · Urinary symptoms including frequency and incontinence.  Medications that may help  Some medications can help ease the effects of perimenopause. These include:  · Low-dose birth control pills. These often contain both estrogen and progesterone. They can help regulate your periods.  · Hormone therapy (HT). This replaces some of the hormones your body has stopped making.  · Antidepressants. These help balance brain chemicals that may decrease during this time. Signs of depression can include often feeling sad or hopeless. If you feel this way, be sure to talk to your health care provider.  · Gabapentin. This is a medication also used to treat seizures. This controls hot flashes and night sweats in some women.  · Clonidine. This medication may control hot flashes and night sweats.  Date Last Reviewed: 4/26/2015  © 6809-1990 [employer]. [address] [place] [address]. All rights reserved. This information is not intended as a substitute for professional medical care. Always follow your healthcare professional's instructions.        Hormone Changes During Menopause  Menopause is not a sudden change. During the months or years before menopause (perimenopause), your ovaries begin to run out of eggs. Your body makes less estrogen and progesterone. This may bring on symptoms such as hot flashes. Youve reached menopause when you have not had a period for 1 year. From that point on, you are in postmenopause.  Perimenopause  In the years leading up to menopause, your ovaries make less estrogen. You release fewer eggs and your periods become less regular.  Symptoms you may have:  · Heavier or lighter periods  · Longer or shorter time between periods  · Hot flashes  · Mood swings  · Night sweats  · Insomnia  · Vaginal dryness  · Urinary changes including incontinence and frequency  Postmenopause  After menopause, you make very little estrogen. As a result,  the uterine lining does not thicken and your periods have ended.  Symptoms you may have:  · No periods  · Vaginal dryness  · Hot flashes  · Mood swings  · Night sweats  · Insomnia  Surgical menopause  Menopause can occur after a surgical removal of the uterus (hysterectomy) if the ovaries are also removed. Estrogen and progesterone levels decrease quickly. This may cause sudden and severe symptoms.   Date Last Reviewed: 5/13/2015  © 4822-6070 [employer]. [address] [place] [address], [place] [address]. All rights reserved. This information is not intended as a substitute for professional medical care. Always follow your healthcare professional's instructions.        Coping with Perimenopause     Being active in ways you enjoy can help you feel better.     For most women, the symptoms of perimenopause subside after entering menopause, although hot flashes and vaginal dryness can continue after periods have stopped. In the meantime, symptoms can be relieved to help you feel better. Leading a healthy lifestyle can keep you feeling your best. The tips below can help. Doing things you enjoy and spending time with people you love are great ways to keep your spirits up as your body goes through the changes of perimenopause.  Menstrual changes  · What happens: As hormone levels go down, periods can become irregular and hard to predict. These changes are often the first sign of perimenopause.  · What you can do: Keep pads or tampons on hand in case your period comes unexpectedly. You may also want to talk to your health care provider about taking birth control pills to help regulate your periods.  Hot flashes and night sweats  · What happens: During a hot flash, you suddenly feel very warm. This may happen often, or just once in a while. Hot flashes at night may interrupt sleep. You may also wake up covered in sweat (night sweats).  · What you can do: Wear layers that you can remove. Try all-cotton clothing, sheets,  and blankets. At night, open your bedroom window. Keep a glass of water or small fan by your bed in case a hot flash wakes you up.  Bone changes  · What happens: Lower levels of estrogen increase your risk of osteoporosis, a disease that weakens the bones. This can cause your bones to break more easily.  · What you can do: Foods high in calcium and vitamin D help protect your bones. Your health care provider may also suggest taking a calcium supplement. Quitting smoking, avoiding alcohol, and exercising can also help.  Vaginal changes  · What happens: In the absence of estrogen, the lining of the vagina can become thin and dry. This can make sex painful. Vaginal infections may also be more likely.  · What you can do: Apply a water-based lubricant before having sex. If you are not using condoms or diaphragms for birth control, you can also use silicone-based lubricants, which do not have to be reapplied as often as water-based lubricants. Over-the-counter vaginal moisturizers can be used anytime, not just when you are having sex. You can also talk with your health care provider about using a vaginal cream that contains estrogen.  Mood swings  · What happens: As hormone levels decrease, so do chemicals that affect your mood. Hot flashes and trouble sleeping can make mood swings worse. Your sex drive may also decrease.  · What you can do: Understand that these feelings are common. Talk to your partner and to other women your age about how youre feeling. Try exercising, which increases levels of mood-boosting chemicals in your body.  Stay active!  Activity can help you relax and gives you more energy. Exercise also helps keep bones and muscles strong. Staying fit may even give your sex drive a lift. Consider the following:  · Weight-bearing activities, such as walking and jogging, help maintain bone density. This can help prevent osteoporosis.  · Aerobic activities boost energy by moving oxygen through the body.  Walking and jogging are aerobic. You might also try swimming or biking.  · Sunlight helps raise levels of brain chemicals that boost your mood. Spending time outdoors can improve your mood, even on a cloudy day. Even a walk around the block can help!  If youre concerned about sex  You may notice that youre not as interested in sex as you used to be. This is very common during perimenopause. Since youre more likely to enjoy sex when youre comfortable, getting your symptoms under control might help. Talk to your health care provider about medications or other options. And remember that theres more to sex than intercourse. Relax and take your time. Talk to your partner about trying new things to help get you aroused.  Date Last Reviewed: 5/18/2015  © 1270-9422 [employer]. [address] [place] [address], [place] [address]. All rights reserved. This information is not intended as a substitute for professional medical care. Always follow your healthcare professional's instructions.

## 2020-12-18 ENCOUNTER — HOSPITAL ENCOUNTER (OUTPATIENT)
Dept: RADIOLOGY | Facility: HOSPITAL | Age: 45
Discharge: HOME | End: 2020-12-18
Attending: OBSTETRICS & GYNECOLOGY
Payer: COMMERCIAL

## 2020-12-18 DIAGNOSIS — N92.6 MENSTRUAL DISORDER: ICD-10-CM

## 2020-12-18 PROCEDURE — 76830 US PELVIS COMP WITH TRANSVAG NON-OB (XPD): ICD-10-PCS | Mod: 26,,, | Performed by: RADIOLOGY

## 2020-12-18 PROCEDURE — 76830 TRANSVAGINAL US NON-OB: CPT | Mod: 26,,, | Performed by: RADIOLOGY

## 2020-12-18 PROCEDURE — 76856 US EXAM PELVIC COMPLETE: CPT | Mod: 26,,, | Performed by: RADIOLOGY

## 2020-12-18 PROCEDURE — 76830 TRANSVAGINAL US NON-OB: CPT | Mod: TC,PO

## 2020-12-18 PROCEDURE — 76856 US PELVIS COMP WITH TRANSVAG NON-OB (XPD): ICD-10-PCS | Mod: 26,,, | Performed by: RADIOLOGY

## 2020-12-22 ENCOUNTER — PATIENT MESSAGE (OUTPATIENT)
Dept: OBSTETRICS AND GYNECOLOGY | Facility: CLINIC | Age: 45
End: 2020-12-22

## 2021-01-06 ENCOUNTER — PATIENT MESSAGE (OUTPATIENT)
Dept: FAMILY MEDICINE | Facility: CLINIC | Age: 46
End: 2021-01-06

## 2021-01-22 ENCOUNTER — NURSE TRIAGE (OUTPATIENT)
Dept: ADMINISTRATIVE | Facility: CLINIC | Age: 46
End: 2021-01-22

## 2021-01-22 ENCOUNTER — TELEPHONE (OUTPATIENT)
Dept: OBSTETRICS AND GYNECOLOGY | Facility: CLINIC | Age: 46
End: 2021-01-22

## 2021-01-26 ENCOUNTER — TELEPHONE (OUTPATIENT)
Dept: OBSTETRICS AND GYNECOLOGY | Facility: CLINIC | Age: 46
End: 2021-01-26

## 2021-01-26 ENCOUNTER — PROCEDURE VISIT (OUTPATIENT)
Dept: OBSTETRICS AND GYNECOLOGY | Facility: CLINIC | Age: 46
End: 2021-01-26
Attending: OBSTETRICS & GYNECOLOGY
Payer: COMMERCIAL

## 2021-01-26 VITALS
BODY MASS INDEX: 33.03 KG/M2 | SYSTOLIC BLOOD PRESSURE: 130 MMHG | HEIGHT: 61 IN | DIASTOLIC BLOOD PRESSURE: 76 MMHG | WEIGHT: 174.94 LBS

## 2021-01-26 DIAGNOSIS — N92.1 MENORRHAGIA WITH IRREGULAR CYCLE: Primary | ICD-10-CM

## 2021-01-26 LAB
B-HCG UR QL: NEGATIVE
CTP QC/QA: YES

## 2021-01-26 PROCEDURE — 58100 BIOPSY OF UTERUS LINING: CPT | Mod: S$GLB,,, | Performed by: OBSTETRICS & GYNECOLOGY

## 2021-01-26 PROCEDURE — 88305 TISSUE EXAM BY PATHOLOGIST: ICD-10-PCS | Mod: 26,,, | Performed by: PATHOLOGY

## 2021-01-26 PROCEDURE — 58100 ENDOMETRIAL BIOPSY: ICD-10-PCS | Mod: S$GLB,,, | Performed by: OBSTETRICS & GYNECOLOGY

## 2021-01-26 PROCEDURE — 88305 TISSUE EXAM BY PATHOLOGIST: CPT | Performed by: PATHOLOGY

## 2021-01-26 PROCEDURE — 88305 TISSUE EXAM BY PATHOLOGIST: CPT | Mod: 26,,, | Performed by: PATHOLOGY

## 2021-02-01 ENCOUNTER — OFFICE VISIT (OUTPATIENT)
Dept: FAMILY MEDICINE | Facility: CLINIC | Age: 46
End: 2021-02-01
Payer: COMMERCIAL

## 2021-02-01 VITALS
HEART RATE: 90 BPM | OXYGEN SATURATION: 100 % | WEIGHT: 175.06 LBS | DIASTOLIC BLOOD PRESSURE: 80 MMHG | BODY MASS INDEX: 33.07 KG/M2 | SYSTOLIC BLOOD PRESSURE: 142 MMHG

## 2021-02-01 DIAGNOSIS — R03.0 BLOOD PRESSURE ELEVATED WITHOUT HISTORY OF HTN: Primary | ICD-10-CM

## 2021-02-01 DIAGNOSIS — F41.9 ANXIETY: ICD-10-CM

## 2021-02-01 LAB
FINAL PATHOLOGIC DIAGNOSIS: NORMAL
GROSS: NORMAL

## 2021-02-01 PROCEDURE — 1126F AMNT PAIN NOTED NONE PRSNT: CPT | Mod: S$GLB,,, | Performed by: FAMILY MEDICINE

## 2021-02-01 PROCEDURE — 3008F BODY MASS INDEX DOCD: CPT | Mod: CPTII,S$GLB,, | Performed by: FAMILY MEDICINE

## 2021-02-01 PROCEDURE — 99214 PR OFFICE/OUTPT VISIT, EST, LEVL IV, 30-39 MIN: ICD-10-PCS | Mod: S$GLB,,, | Performed by: FAMILY MEDICINE

## 2021-02-01 PROCEDURE — 99214 OFFICE O/P EST MOD 30 MIN: CPT | Mod: S$GLB,,, | Performed by: FAMILY MEDICINE

## 2021-02-01 PROCEDURE — 1126F PR PAIN SEVERITY QUANTIFIED, NO PAIN PRESENT: ICD-10-PCS | Mod: S$GLB,,, | Performed by: FAMILY MEDICINE

## 2021-02-01 PROCEDURE — 3008F PR BODY MASS INDEX (BMI) DOCUMENTED: ICD-10-PCS | Mod: CPTII,S$GLB,, | Performed by: FAMILY MEDICINE

## 2021-02-01 PROCEDURE — 99999 PR PBB SHADOW E&M-EST. PATIENT-LVL III: CPT | Mod: PBBFAC,,, | Performed by: FAMILY MEDICINE

## 2021-02-01 PROCEDURE — 99999 PR PBB SHADOW E&M-EST. PATIENT-LVL III: ICD-10-PCS | Mod: PBBFAC,,, | Performed by: FAMILY MEDICINE

## 2021-02-04 ENCOUNTER — PATIENT MESSAGE (OUTPATIENT)
Dept: OBSTETRICS AND GYNECOLOGY | Facility: CLINIC | Age: 46
End: 2021-02-04

## 2021-02-05 ENCOUNTER — PATIENT MESSAGE (OUTPATIENT)
Dept: OBSTETRICS AND GYNECOLOGY | Facility: CLINIC | Age: 46
End: 2021-02-05

## 2021-02-15 ENCOUNTER — TELEPHONE (OUTPATIENT)
Dept: FAMILY MEDICINE | Facility: CLINIC | Age: 46
End: 2021-02-15

## 2021-02-26 ENCOUNTER — CLINICAL SUPPORT (OUTPATIENT)
Dept: FAMILY MEDICINE | Facility: CLINIC | Age: 46
End: 2021-02-26
Payer: COMMERCIAL

## 2021-02-26 VITALS — SYSTOLIC BLOOD PRESSURE: 134 MMHG | HEART RATE: 75 BPM | OXYGEN SATURATION: 99 % | DIASTOLIC BLOOD PRESSURE: 82 MMHG

## 2021-02-26 DIAGNOSIS — Z01.30 BLOOD PRESSURE CHECK: Primary | ICD-10-CM

## 2021-02-26 PROCEDURE — 99499 NO LOS: ICD-10-PCS | Mod: S$GLB,,, | Performed by: FAMILY MEDICINE

## 2021-02-26 PROCEDURE — 99999 PR PBB SHADOW E&M-EST. PATIENT-LVL III: ICD-10-PCS | Mod: PBBFAC,,,

## 2021-02-26 PROCEDURE — 99999 PR PBB SHADOW E&M-EST. PATIENT-LVL III: CPT | Mod: PBBFAC,,,

## 2021-02-26 PROCEDURE — 99499 UNLISTED E&M SERVICE: CPT | Mod: S$GLB,,, | Performed by: FAMILY MEDICINE

## 2021-03-11 DIAGNOSIS — J30.1 SEASONAL ALLERGIC RHINITIS DUE TO POLLEN: ICD-10-CM

## 2021-03-12 RX ORDER — FLUTICASONE PROPIONATE 50 MCG
SPRAY, SUSPENSION (ML) NASAL
Qty: 48 G | Refills: 3 | Status: SHIPPED | OUTPATIENT
Start: 2021-03-12 | End: 2021-08-24

## 2021-03-24 ENCOUNTER — OFFICE VISIT (OUTPATIENT)
Dept: FAMILY MEDICINE | Facility: CLINIC | Age: 46
End: 2021-03-24
Payer: COMMERCIAL

## 2021-03-24 VITALS
OXYGEN SATURATION: 100 % | HEART RATE: 79 BPM | WEIGHT: 174.38 LBS | DIASTOLIC BLOOD PRESSURE: 78 MMHG | TEMPERATURE: 98 F | SYSTOLIC BLOOD PRESSURE: 116 MMHG | BODY MASS INDEX: 32.95 KG/M2

## 2021-03-24 DIAGNOSIS — Z00.00 WELLNESS EXAMINATION: Primary | ICD-10-CM

## 2021-03-24 PROCEDURE — 99396 PR PREVENTIVE VISIT,EST,40-64: ICD-10-PCS | Mod: S$GLB,,, | Performed by: FAMILY MEDICINE

## 2021-03-24 PROCEDURE — 1126F AMNT PAIN NOTED NONE PRSNT: CPT | Mod: S$GLB,,, | Performed by: FAMILY MEDICINE

## 2021-03-24 PROCEDURE — 99999 PR PBB SHADOW E&M-EST. PATIENT-LVL III: ICD-10-PCS | Mod: PBBFAC,,, | Performed by: FAMILY MEDICINE

## 2021-03-24 PROCEDURE — 1126F PR PAIN SEVERITY QUANTIFIED, NO PAIN PRESENT: ICD-10-PCS | Mod: S$GLB,,, | Performed by: FAMILY MEDICINE

## 2021-03-24 PROCEDURE — 99396 PREV VISIT EST AGE 40-64: CPT | Mod: S$GLB,,, | Performed by: FAMILY MEDICINE

## 2021-03-24 PROCEDURE — 99999 PR PBB SHADOW E&M-EST. PATIENT-LVL III: CPT | Mod: PBBFAC,,, | Performed by: FAMILY MEDICINE

## 2021-03-24 PROCEDURE — 3008F PR BODY MASS INDEX (BMI) DOCUMENTED: ICD-10-PCS | Mod: CPTII,S$GLB,, | Performed by: FAMILY MEDICINE

## 2021-03-24 PROCEDURE — 3008F BODY MASS INDEX DOCD: CPT | Mod: CPTII,S$GLB,, | Performed by: FAMILY MEDICINE

## 2021-03-26 ENCOUNTER — LAB VISIT (OUTPATIENT)
Dept: LAB | Facility: HOSPITAL | Age: 46
End: 2021-03-26
Attending: FAMILY MEDICINE
Payer: COMMERCIAL

## 2021-03-26 DIAGNOSIS — Z00.00 WELLNESS EXAMINATION: ICD-10-CM

## 2021-03-26 LAB
ALBUMIN SERPL BCP-MCNC: 4 G/DL (ref 3.5–5.2)
ALP SERPL-CCNC: 60 U/L (ref 55–135)
ALT SERPL W/O P-5'-P-CCNC: 24 U/L (ref 10–44)
ANION GAP SERPL CALC-SCNC: 11 MMOL/L (ref 8–16)
AST SERPL-CCNC: 26 U/L (ref 10–40)
BASOPHILS # BLD AUTO: 0.05 K/UL (ref 0–0.2)
BASOPHILS NFR BLD: 0.7 % (ref 0–1.9)
BILIRUB SERPL-MCNC: 0.5 MG/DL (ref 0.1–1)
BUN SERPL-MCNC: 10 MG/DL (ref 6–20)
CALCIUM SERPL-MCNC: 9.3 MG/DL (ref 8.7–10.5)
CHLORIDE SERPL-SCNC: 106 MMOL/L (ref 95–110)
CHOLEST SERPL-MCNC: 251 MG/DL (ref 120–199)
CHOLEST/HDLC SERPL: 4.2 {RATIO} (ref 2–5)
CO2 SERPL-SCNC: 23 MMOL/L (ref 23–29)
CREAT SERPL-MCNC: 0.8 MG/DL (ref 0.5–1.4)
DIFFERENTIAL METHOD: ABNORMAL
EOSINOPHIL # BLD AUTO: 0.5 K/UL (ref 0–0.5)
EOSINOPHIL NFR BLD: 6.1 % (ref 0–8)
ERYTHROCYTE [DISTWIDTH] IN BLOOD BY AUTOMATED COUNT: 13.2 % (ref 11.5–14.5)
EST. GFR  (AFRICAN AMERICAN): >60 ML/MIN/1.73 M^2
EST. GFR  (NON AFRICAN AMERICAN): >60 ML/MIN/1.73 M^2
ESTIMATED AVG GLUCOSE: 94 MG/DL (ref 68–131)
GLUCOSE SERPL-MCNC: 86 MG/DL (ref 70–110)
HBA1C MFR BLD: 4.9 % (ref 4–5.6)
HCT VFR BLD AUTO: 43.5 % (ref 37–48.5)
HDLC SERPL-MCNC: 60 MG/DL (ref 40–75)
HDLC SERPL: 23.9 % (ref 20–50)
HGB BLD-MCNC: 14 G/DL (ref 12–16)
IMM GRANULOCYTES # BLD AUTO: 0.01 K/UL (ref 0–0.04)
IMM GRANULOCYTES NFR BLD AUTO: 0.1 % (ref 0–0.5)
LDLC SERPL CALC-MCNC: 155.2 MG/DL (ref 63–159)
LYMPHOCYTES # BLD AUTO: 2.1 K/UL (ref 1–4.8)
LYMPHOCYTES NFR BLD: 27.4 % (ref 18–48)
MCH RBC QN AUTO: 29.9 PG (ref 27–31)
MCHC RBC AUTO-ENTMCNC: 32.2 G/DL (ref 32–36)
MCV RBC AUTO: 93 FL (ref 82–98)
MONOCYTES # BLD AUTO: 0.4 K/UL (ref 0.3–1)
MONOCYTES NFR BLD: 5.2 % (ref 4–15)
NEUTROPHILS # BLD AUTO: 4.6 K/UL (ref 1.8–7.7)
NEUTROPHILS NFR BLD: 60.5 % (ref 38–73)
NONHDLC SERPL-MCNC: 191 MG/DL
NRBC BLD-RTO: 0 /100 WBC
PLATELET # BLD AUTO: 420 K/UL (ref 150–350)
PMV BLD AUTO: 9.3 FL (ref 9.2–12.9)
POTASSIUM SERPL-SCNC: 4 MMOL/L (ref 3.5–5.1)
PROT SERPL-MCNC: 7.6 G/DL (ref 6–8.4)
RBC # BLD AUTO: 4.69 M/UL (ref 4–5.4)
SODIUM SERPL-SCNC: 140 MMOL/L (ref 136–145)
TRIGL SERPL-MCNC: 179 MG/DL (ref 30–150)
TSH SERPL DL<=0.005 MIU/L-ACNC: 2.18 UIU/ML (ref 0.4–4)
WBC # BLD AUTO: 7.67 K/UL (ref 3.9–12.7)

## 2021-03-26 PROCEDURE — 80053 COMPREHEN METABOLIC PANEL: CPT | Performed by: FAMILY MEDICINE

## 2021-03-26 PROCEDURE — 85025 COMPLETE CBC W/AUTO DIFF WBC: CPT | Performed by: FAMILY MEDICINE

## 2021-03-26 PROCEDURE — 80061 LIPID PANEL: CPT | Performed by: FAMILY MEDICINE

## 2021-03-26 PROCEDURE — 36415 COLL VENOUS BLD VENIPUNCTURE: CPT | Mod: PO | Performed by: FAMILY MEDICINE

## 2021-03-26 PROCEDURE — 84443 ASSAY THYROID STIM HORMONE: CPT | Performed by: FAMILY MEDICINE

## 2021-03-26 PROCEDURE — 83036 HEMOGLOBIN GLYCOSYLATED A1C: CPT | Performed by: FAMILY MEDICINE

## 2021-08-11 ENCOUNTER — PATIENT MESSAGE (OUTPATIENT)
Dept: FAMILY MEDICINE | Facility: CLINIC | Age: 46
End: 2021-08-11

## 2021-08-17 ENCOUNTER — OFFICE VISIT (OUTPATIENT)
Dept: FAMILY MEDICINE | Facility: CLINIC | Age: 46
End: 2021-08-17
Payer: COMMERCIAL

## 2021-08-17 DIAGNOSIS — H69.90 DYSFUNCTION OF EUSTACHIAN TUBE, UNSPECIFIED LATERALITY: ICD-10-CM

## 2021-08-17 DIAGNOSIS — R42 VERTIGO: Primary | ICD-10-CM

## 2021-08-17 DIAGNOSIS — J30.1 SEASONAL ALLERGIC RHINITIS DUE TO POLLEN: ICD-10-CM

## 2021-08-17 PROCEDURE — 3044F HG A1C LEVEL LT 7.0%: CPT | Mod: CPTII,,, | Performed by: FAMILY MEDICINE

## 2021-08-17 PROCEDURE — 99213 OFFICE O/P EST LOW 20 MIN: CPT | Mod: 95,,, | Performed by: FAMILY MEDICINE

## 2021-08-17 PROCEDURE — 99213 PR OFFICE/OUTPT VISIT, EST, LEVL III, 20-29 MIN: ICD-10-PCS | Mod: 95,,, | Performed by: FAMILY MEDICINE

## 2021-08-17 PROCEDURE — 3044F PR MOST RECENT HEMOGLOBIN A1C LEVEL <7.0%: ICD-10-PCS | Mod: CPTII,,, | Performed by: FAMILY MEDICINE

## 2021-08-17 RX ORDER — MECLIZINE HYDROCHLORIDE 25 MG/1
25 TABLET ORAL 3 TIMES DAILY PRN
Qty: 30 TABLET | Refills: 1 | Status: SHIPPED | OUTPATIENT
Start: 2021-08-17 | End: 2021-08-24

## 2021-08-17 RX ORDER — METHYLPREDNISOLONE 4 MG/1
TABLET ORAL
Qty: 1 PACKAGE | Refills: 0 | Status: SHIPPED | OUTPATIENT
Start: 2021-08-17 | End: 2021-08-24

## 2021-08-24 ENCOUNTER — OFFICE VISIT (OUTPATIENT)
Dept: FAMILY MEDICINE | Facility: CLINIC | Age: 46
End: 2021-08-24
Payer: COMMERCIAL

## 2021-08-24 VITALS
BODY MASS INDEX: 31.95 KG/M2 | OXYGEN SATURATION: 99 % | DIASTOLIC BLOOD PRESSURE: 80 MMHG | SYSTOLIC BLOOD PRESSURE: 148 MMHG | HEART RATE: 89 BPM | WEIGHT: 169.06 LBS

## 2021-08-24 DIAGNOSIS — I10 ESSENTIAL HYPERTENSION: Primary | ICD-10-CM

## 2021-08-24 PROCEDURE — 3044F HG A1C LEVEL LT 7.0%: CPT | Mod: CPTII,S$GLB,, | Performed by: FAMILY MEDICINE

## 2021-08-24 PROCEDURE — 3077F SYST BP >= 140 MM HG: CPT | Mod: CPTII,S$GLB,, | Performed by: FAMILY MEDICINE

## 2021-08-24 PROCEDURE — 3008F PR BODY MASS INDEX (BMI) DOCUMENTED: ICD-10-PCS | Mod: CPTII,S$GLB,, | Performed by: FAMILY MEDICINE

## 2021-08-24 PROCEDURE — 3079F PR MOST RECENT DIASTOLIC BLOOD PRESSURE 80-89 MM HG: ICD-10-PCS | Mod: CPTII,S$GLB,, | Performed by: FAMILY MEDICINE

## 2021-08-24 PROCEDURE — 99213 PR OFFICE/OUTPT VISIT, EST, LEVL III, 20-29 MIN: ICD-10-PCS | Mod: S$GLB,,, | Performed by: FAMILY MEDICINE

## 2021-08-24 PROCEDURE — 1160F RVW MEDS BY RX/DR IN RCRD: CPT | Mod: CPTII,S$GLB,, | Performed by: FAMILY MEDICINE

## 2021-08-24 PROCEDURE — 3044F PR MOST RECENT HEMOGLOBIN A1C LEVEL <7.0%: ICD-10-PCS | Mod: CPTII,S$GLB,, | Performed by: FAMILY MEDICINE

## 2021-08-24 PROCEDURE — 3008F BODY MASS INDEX DOCD: CPT | Mod: CPTII,S$GLB,, | Performed by: FAMILY MEDICINE

## 2021-08-24 PROCEDURE — 3077F PR MOST RECENT SYSTOLIC BLOOD PRESSURE >= 140 MM HG: ICD-10-PCS | Mod: CPTII,S$GLB,, | Performed by: FAMILY MEDICINE

## 2021-08-24 PROCEDURE — 1160F PR REVIEW ALL MEDS BY PRESCRIBER/CLIN PHARMACIST DOCUMENTED: ICD-10-PCS | Mod: CPTII,S$GLB,, | Performed by: FAMILY MEDICINE

## 2021-08-24 PROCEDURE — 1126F PR PAIN SEVERITY QUANTIFIED, NO PAIN PRESENT: ICD-10-PCS | Mod: CPTII,S$GLB,, | Performed by: FAMILY MEDICINE

## 2021-08-24 PROCEDURE — 3079F DIAST BP 80-89 MM HG: CPT | Mod: CPTII,S$GLB,, | Performed by: FAMILY MEDICINE

## 2021-08-24 PROCEDURE — 99213 OFFICE O/P EST LOW 20 MIN: CPT | Mod: S$GLB,,, | Performed by: FAMILY MEDICINE

## 2021-08-24 PROCEDURE — 1159F MED LIST DOCD IN RCRD: CPT | Mod: CPTII,S$GLB,, | Performed by: FAMILY MEDICINE

## 2021-08-24 PROCEDURE — 1159F PR MEDICATION LIST DOCUMENTED IN MEDICAL RECORD: ICD-10-PCS | Mod: CPTII,S$GLB,, | Performed by: FAMILY MEDICINE

## 2021-08-24 PROCEDURE — 99999 PR PBB SHADOW E&M-EST. PATIENT-LVL III: CPT | Mod: PBBFAC,,, | Performed by: FAMILY MEDICINE

## 2021-08-24 PROCEDURE — 1126F AMNT PAIN NOTED NONE PRSNT: CPT | Mod: CPTII,S$GLB,, | Performed by: FAMILY MEDICINE

## 2021-08-24 PROCEDURE — 99999 PR PBB SHADOW E&M-EST. PATIENT-LVL III: ICD-10-PCS | Mod: PBBFAC,,, | Performed by: FAMILY MEDICINE

## 2021-08-24 RX ORDER — CLONIDINE HYDROCHLORIDE 0.1 MG/1
0.1 TABLET ORAL EVERY 12 HOURS
COMMUNITY
Start: 2021-08-21 | End: 2021-08-24

## 2021-08-24 RX ORDER — LOSARTAN POTASSIUM 25 MG/1
25 TABLET ORAL DAILY
Qty: 30 TABLET | Refills: 1 | Status: SHIPPED | OUTPATIENT
Start: 2021-08-24 | End: 2021-10-15 | Stop reason: SDUPTHER

## 2021-09-14 ENCOUNTER — TELEPHONE (OUTPATIENT)
Dept: FAMILY MEDICINE | Facility: CLINIC | Age: 46
End: 2021-09-14

## 2021-09-14 ENCOUNTER — CLINICAL SUPPORT (OUTPATIENT)
Dept: FAMILY MEDICINE | Facility: CLINIC | Age: 46
End: 2021-09-14
Payer: COMMERCIAL

## 2021-09-14 VITALS
SYSTOLIC BLOOD PRESSURE: 118 MMHG | DIASTOLIC BLOOD PRESSURE: 80 MMHG | BODY MASS INDEX: 32.37 KG/M2 | WEIGHT: 171.31 LBS

## 2021-09-14 PROCEDURE — 99999 PR PBB SHADOW E&M-EST. PATIENT-LVL II: ICD-10-PCS | Mod: PBBFAC,,,

## 2021-09-14 PROCEDURE — 99999 PR PBB SHADOW E&M-EST. PATIENT-LVL II: CPT | Mod: PBBFAC,,,

## 2021-09-16 ENCOUNTER — PATIENT MESSAGE (OUTPATIENT)
Dept: FAMILY MEDICINE | Facility: CLINIC | Age: 46
End: 2021-09-16

## 2021-09-21 ENCOUNTER — PATIENT MESSAGE (OUTPATIENT)
Dept: FAMILY MEDICINE | Facility: CLINIC | Age: 46
End: 2021-09-21

## 2021-10-06 ENCOUNTER — PATIENT OUTREACH (OUTPATIENT)
Dept: ADMINISTRATIVE | Facility: HOSPITAL | Age: 46
End: 2021-10-06

## 2021-10-06 ENCOUNTER — PATIENT MESSAGE (OUTPATIENT)
Dept: ADMINISTRATIVE | Facility: HOSPITAL | Age: 46
End: 2021-10-06

## 2021-10-15 DIAGNOSIS — J30.1 SEASONAL ALLERGIC RHINITIS DUE TO POLLEN: ICD-10-CM

## 2021-10-15 DIAGNOSIS — I10 ESSENTIAL HYPERTENSION: ICD-10-CM

## 2021-10-15 RX ORDER — LOSARTAN POTASSIUM 25 MG/1
25 TABLET ORAL DAILY
Qty: 90 TABLET | Refills: 1 | Status: SHIPPED | OUTPATIENT
Start: 2021-10-15 | End: 2022-03-30 | Stop reason: SDUPTHER

## 2021-10-16 RX ORDER — FLUTICASONE PROPIONATE 50 MCG
SPRAY, SUSPENSION (ML) NASAL
Qty: 16 G | Refills: 3 | OUTPATIENT
Start: 2021-10-16

## 2021-10-21 ENCOUNTER — TELEPHONE (OUTPATIENT)
Dept: OBSTETRICS AND GYNECOLOGY | Facility: CLINIC | Age: 46
End: 2021-10-21

## 2021-10-21 DIAGNOSIS — Z12.31 SCREENING MAMMOGRAM, ENCOUNTER FOR: Primary | ICD-10-CM

## 2021-11-05 ENCOUNTER — PATIENT MESSAGE (OUTPATIENT)
Dept: FAMILY MEDICINE | Facility: CLINIC | Age: 46
End: 2021-11-05
Payer: COMMERCIAL

## 2021-11-05 DIAGNOSIS — J30.1 SEASONAL ALLERGIC RHINITIS DUE TO POLLEN: Primary | ICD-10-CM

## 2021-11-05 DIAGNOSIS — J30.1 SEASONAL ALLERGIC RHINITIS DUE TO POLLEN: ICD-10-CM

## 2021-11-05 RX ORDER — FLUTICASONE PROPIONATE 50 MCG
1 SPRAY, SUSPENSION (ML) NASAL DAILY
COMMUNITY

## 2021-11-06 RX ORDER — FLUTICASONE PROPIONATE 50 MCG
SPRAY, SUSPENSION (ML) NASAL
Qty: 16 G | Refills: 3 | OUTPATIENT
Start: 2021-11-06

## 2021-11-06 RX ORDER — FLUTICASONE PROPIONATE 50 MCG
1 SPRAY, SUSPENSION (ML) NASAL DAILY
OUTPATIENT
Start: 2021-11-06

## 2021-12-06 ENCOUNTER — TELEPHONE (OUTPATIENT)
Dept: OBSTETRICS AND GYNECOLOGY | Facility: CLINIC | Age: 46
End: 2021-12-06
Payer: COMMERCIAL

## 2021-12-14 ENCOUNTER — PATIENT OUTREACH (OUTPATIENT)
Dept: ADMINISTRATIVE | Facility: OTHER | Age: 46
End: 2021-12-14
Payer: COMMERCIAL

## 2021-12-15 ENCOUNTER — OFFICE VISIT (OUTPATIENT)
Dept: OBSTETRICS AND GYNECOLOGY | Facility: CLINIC | Age: 46
End: 2021-12-15
Payer: COMMERCIAL

## 2021-12-15 VITALS
DIASTOLIC BLOOD PRESSURE: 84 MMHG | SYSTOLIC BLOOD PRESSURE: 132 MMHG | HEIGHT: 61 IN | WEIGHT: 168.44 LBS | BODY MASS INDEX: 31.8 KG/M2

## 2021-12-15 DIAGNOSIS — Z12.31 SCREENING MAMMOGRAM, ENCOUNTER FOR: ICD-10-CM

## 2021-12-15 DIAGNOSIS — Z12.4 PAP SMEAR FOR CERVICAL CANCER SCREENING: ICD-10-CM

## 2021-12-15 DIAGNOSIS — Z01.419 ENCOUNTER FOR GYNECOLOGICAL EXAMINATION WITHOUT ABNORMAL FINDING: Primary | ICD-10-CM

## 2021-12-15 PROCEDURE — 87624 HPV HI-RISK TYP POOLED RSLT: CPT | Performed by: OBSTETRICS & GYNECOLOGY

## 2021-12-15 PROCEDURE — 99396 PREV VISIT EST AGE 40-64: CPT | Mod: S$GLB,,, | Performed by: OBSTETRICS & GYNECOLOGY

## 2021-12-15 PROCEDURE — 4010F ACE/ARB THERAPY RXD/TAKEN: CPT | Mod: CPTII,S$GLB,, | Performed by: OBSTETRICS & GYNECOLOGY

## 2021-12-15 PROCEDURE — 4010F PR ACE/ARB THEARPY RXD/TAKEN: ICD-10-PCS | Mod: CPTII,S$GLB,, | Performed by: OBSTETRICS & GYNECOLOGY

## 2021-12-15 PROCEDURE — 99396 PR PREVENTIVE VISIT,EST,40-64: ICD-10-PCS | Mod: S$GLB,,, | Performed by: OBSTETRICS & GYNECOLOGY

## 2021-12-15 PROCEDURE — 99999 PR PBB SHADOW E&M-EST. PATIENT-LVL III: CPT | Mod: PBBFAC,,, | Performed by: OBSTETRICS & GYNECOLOGY

## 2021-12-15 PROCEDURE — 99999 PR PBB SHADOW E&M-EST. PATIENT-LVL III: ICD-10-PCS | Mod: PBBFAC,,, | Performed by: OBSTETRICS & GYNECOLOGY

## 2021-12-21 LAB
CYTOLOGIST CVX/VAG CYTO: NORMAL
CYTOLOGY CVX/VAG DOC CYTO: NORMAL
CYTOLOGY CVX/VAG DOC THIN PREP: NORMAL
CYTOLOGY THINPREP PAP COMMENT: NORMAL
HPV HR 12 DNA CVX QL NAA+PROBE: NEGATIVE
HPV16 DNA CVX QL NAA+PROBE: NEGATIVE
HPV18 DNA CVX QL NAA+PROBE: NEGATIVE
PAP NOTE: NORMAL
STAT OF ADQ CVX/VAG CYTO-IMP: NORMAL

## 2021-12-28 ENCOUNTER — PATIENT MESSAGE (OUTPATIENT)
Dept: FAMILY MEDICINE | Facility: CLINIC | Age: 46
End: 2021-12-28
Payer: COMMERCIAL

## 2022-01-03 ENCOUNTER — HOSPITAL ENCOUNTER (OUTPATIENT)
Dept: RADIOLOGY | Facility: OTHER | Age: 47
Discharge: HOME | End: 2022-01-03
Attending: OBSTETRICS & GYNECOLOGY
Payer: COMMERCIAL

## 2022-01-03 DIAGNOSIS — Z12.31 SCREENING MAMMOGRAM, ENCOUNTER FOR: ICD-10-CM

## 2022-01-03 PROCEDURE — 77067 SCR MAMMO BI INCL CAD: CPT | Mod: 26,,, | Performed by: RADIOLOGY

## 2022-01-03 PROCEDURE — 77063 BREAST TOMOSYNTHESIS BI: CPT | Mod: 26,,, | Performed by: RADIOLOGY

## 2022-01-03 PROCEDURE — 77067 MAMMO DIGITAL SCREENING BILAT WITH TOMO: ICD-10-PCS | Mod: 26,,, | Performed by: RADIOLOGY

## 2022-01-03 PROCEDURE — 77063 BREAST TOMOSYNTHESIS BI: CPT | Mod: TC

## 2022-01-03 PROCEDURE — 77063 MAMMO DIGITAL SCREENING BILAT WITH TOMO: ICD-10-PCS | Mod: 26,,, | Performed by: RADIOLOGY

## 2022-02-23 ENCOUNTER — PATIENT MESSAGE (OUTPATIENT)
Dept: FAMILY MEDICINE | Facility: CLINIC | Age: 47
End: 2022-02-23

## 2022-02-23 ENCOUNTER — OFFICE VISIT (OUTPATIENT)
Dept: FAMILY MEDICINE | Facility: CLINIC | Age: 47
End: 2022-02-23
Payer: COMMERCIAL

## 2022-02-23 DIAGNOSIS — J32.9 SINUSITIS, UNSPECIFIED CHRONICITY, UNSPECIFIED LOCATION: Primary | ICD-10-CM

## 2022-02-23 PROCEDURE — 4010F ACE/ARB THERAPY RXD/TAKEN: CPT | Mod: CPTII,95,, | Performed by: NURSE PRACTITIONER

## 2022-02-23 PROCEDURE — 99213 PR OFFICE/OUTPT VISIT, EST, LEVL III, 20-29 MIN: ICD-10-PCS | Mod: 95,,, | Performed by: NURSE PRACTITIONER

## 2022-02-23 PROCEDURE — 1160F PR REVIEW ALL MEDS BY PRESCRIBER/CLIN PHARMACIST DOCUMENTED: ICD-10-PCS | Mod: CPTII,95,, | Performed by: NURSE PRACTITIONER

## 2022-02-23 PROCEDURE — 1159F PR MEDICATION LIST DOCUMENTED IN MEDICAL RECORD: ICD-10-PCS | Mod: CPTII,95,, | Performed by: NURSE PRACTITIONER

## 2022-02-23 PROCEDURE — 1159F MED LIST DOCD IN RCRD: CPT | Mod: CPTII,95,, | Performed by: NURSE PRACTITIONER

## 2022-02-23 PROCEDURE — 1160F RVW MEDS BY RX/DR IN RCRD: CPT | Mod: CPTII,95,, | Performed by: NURSE PRACTITIONER

## 2022-02-23 PROCEDURE — 4010F PR ACE/ARB THEARPY RXD/TAKEN: ICD-10-PCS | Mod: CPTII,95,, | Performed by: NURSE PRACTITIONER

## 2022-02-23 PROCEDURE — 99213 OFFICE O/P EST LOW 20 MIN: CPT | Mod: 95,,, | Performed by: NURSE PRACTITIONER

## 2022-02-23 RX ORDER — PREDNISONE 20 MG/1
20 TABLET ORAL DAILY
Qty: 5 TABLET | Refills: 0 | Status: SHIPPED | OUTPATIENT
Start: 2022-02-23 | End: 2022-02-28

## 2022-02-23 RX ORDER — AMOXICILLIN 875 MG/1
875 TABLET, FILM COATED ORAL EVERY 12 HOURS
Qty: 14 TABLET | Refills: 0 | Status: SHIPPED | OUTPATIENT
Start: 2022-02-23 | End: 2022-03-02

## 2022-02-23 NOTE — PROGRESS NOTES
Answers for HPI/ROS submitted by the patient on 2/23/2022  Chronicity: new  Onset: yesterday  Progression since onset: rapidly worsening  Pain worse on: right  Fever: no fever  Pain - numeric: 7/10  abdominal pain: No  congestion: Yes  cough: Yes  diarrhea: No  drooling: No  ear discharge: No  ear pain: No  headaches: No  hoarse voice: Yes  neck pain: No  plugged ear sensation: No  shortness of breath: No  stridor: No  swollen glands: Yes  trouble swallowing: Yes  vomiting: No  strep: No  mono: No  Treatments tried: NSAIDs, gargles  Improvement on treatment: mild  Pain severity: moderate

## 2022-02-23 NOTE — PROGRESS NOTES
Subjective:       Patient ID: Vera De Leon is a 47 y.o. female.    Chief Complaint: No chief complaint on file.    The patient location is: [place]  The chief complaint leading to consultation is: sinus, sore throat    Visit type: audiovisual    Face to Face time with patient: 13  15 minutes of total time spent on the encounter, which includes face to face time and non-face to face time preparing to see the patient (eg, review of tests), Obtaining and/or reviewing separately obtained history, Documenting clinical information in the electronic or other health record, Independently interpreting results (not separately reported) and communicating results to the patient/family/caregiver, or Care coordination (not separately reported).         Each patient to whom he or she provides medical services by telemedicine is:  (1) informed of the relationship between the physician and patient and the respective role of any other health care provider with respect to management of the patient; and (2) notified that he or she may decline to receive medical services by telemedicine and may withdraw from such care at any time.    Notes:     Sinus drip and severe sore throat x 2 days. Urgent Care today, had negative strep and covid 19. Not prescribed anything, told to take OTc. Patient has thick green mucus from nose, sinus pressure beginning, says generally gets a sinus infection yearly. Says throat is so sore she can not swallow without pain.    Past Medical History:  No date: Allergy  No date: Asthma  No date: Chronic kidney disease      Comment:  kidney stones    Past Surgical History:  : Breast reduction; Bilateral  1999; 3/2005:  SECTION  No date: TOTAL REDUCTION MAMMOPLASTY      Comment:  2002  3/2005: TUBAL LIGATION    Review of patient's family history indicates:  Problem: Hypertension      Relation: Mother          Age of Onset: (Not Specified)  Problem: Alzheimer's disease      Relation: Paternal  Grandmother          Age of Onset: (Not Specified)  Problem: Breast cancer      Relation: Neg Hx          Age of Onset: (Not Specified)  Problem: Colon cancer      Relation: Neg Hx          Age of Onset: (Not Specified)  Problem: Ovarian cancer      Relation: Neg Hx          Age of Onset: (Not Specified)  Problem: Diabetes      Relation: Neg Hx          Age of Onset: (Not Specified)      Social History    Socioeconomic History      Marital status:     Tobacco Use      Smoking status: Never Smoker      Smokeless tobacco: Never Used    Substance and Sexual Activity      Alcohol use: No      Drug use: No        Comment: Tubal ligation      Sexual activity: Yes        Partners: Male        Birth control/protection: Surgical        Comment:  x 24 years since 1997     Social Determinants of Health  Financial Resource Strain: Low Risk       Difficulty of Paying Living Expenses: Not hard at all  Food Insecurity: No Food Insecurity      Worried About Running Out of Food in the Last Year: Never true      Ran Out of Food in the Last Year: Never true  Transportation Needs: No Transportation Needs      Lack of Transportation (Medical): No      Lack of Transportation (Non-Medical): No  Physical Activity: Sufficiently Active      Days of Exercise per Week: 4 days      Minutes of Exercise per Session: 60 min  Stress: No Stress Concern Present      Feeling of Stress : Not at all  Social Connections: Unknown      Frequency of Communication with Friends and Family: Patient refused      Frequency of Social Gatherings with Friends and Family: Patient refused      Active Member of Clubs or Organizations: No      Attends Club or Organization Meetings: Never      Marital Status:   Housing Stability: High Risk      Unable to Pay for Housing in the Last Year: Yes      Unstable Housing in the Last Year: No    Current Outpatient Medications:  albuterol (PROAIR HFA) 90 mcg/actuation inhaler, Inhale 2 puffs into the lungs  every 6 (six) hours as needed for Wheezing. Rescue, Disp: 18 g, Rfl: 5  fluticasone propionate (FLONASE) 50 mcg/actuation nasal spray, 1 spray by Each Nostril route once daily., Disp: , Rfl:   losartan (COZAAR) 25 MG tablet, Take 1 tablet (25 mg total) by mouth once daily., Disp: 90 tablet, Rfl: 1    No current facility-administered medications for this visit.      Review of patient's allergies indicates:   -- Latex -- Rash and Shortness Of Breath   -- Propofol -- Swelling   -- Toradol  (ketorolac) -- Swelling    Sore Throat   This is a new problem. The current episode started yesterday. The problem has been rapidly worsening. The pain is worse on the right side. There has been no fever. The pain is at a severity of 7/10. The pain is moderate. Associated symptoms include congestion, coughing (productive), a hoarse voice, swollen glands and trouble swallowing. Pertinent negatives include no abdominal pain, diarrhea, drooling, ear discharge, ear pain, headaches, plugged ear sensation, neck pain, shortness of breath, stridor or vomiting. She has had no exposure to strep or mono. She has tried NSAIDs and gargles for the symptoms. The treatment provided mild relief.     Review of Systems   Constitutional: Positive for fatigue. Negative for chills, diaphoresis and fever.   HENT: Positive for nasal congestion, hoarse voice, postnasal drip, rhinorrhea, sinus pressure/congestion, sore throat and trouble swallowing. Negative for drooling, ear discharge and ear pain.    Respiratory: Positive for cough (productive). Negative for shortness of breath and stridor.    Cardiovascular: Negative.    Gastrointestinal: Negative.  Negative for abdominal pain, diarrhea and vomiting.   Musculoskeletal: Negative for neck pain.   Integumentary:  Negative.   Neurological: Negative.  Negative for dizziness and headaches.         Objective:      Physical Exam  Constitutional:       General: She is not in acute distress.     Appearance: Normal  appearance. She is ill-appearing.   HENT:      Head: Normocephalic and atraumatic.      Salivary Glands: Right salivary gland is tender. Left salivary gland is tender.      Nose: Congestion present.   Pulmonary:      Effort: Pulmonary effort is normal. No respiratory distress.   Neurological:      Mental Status: She is alert and oriented to person, place, and time.   Psychiatric:         Mood and Affect: Mood normal.         Behavior: Behavior normal.         Assessment:       Problem List Items Addressed This Visit    None     Visit Diagnoses     Sinusitis, unspecified chronicity, unspecified location    -  Primary    Relevant Medications    (Magic mouthwash) 1:1:1 diphenhydramine(Benadryl) 12.5mg/5ml liq, aluminum & magnesium hydroxide-simethicone (Maalox), LIDOcaine viscous 2%    amoxicillin (AMOXIL) 875 MG tablet    predniSONE (DELTASONE) 20 MG tablet          Plan:       1. Sinusitis, unspecified chronicity, unspecified location  Follow up if not resolving, immediately for new or worsening. Use magic mouthwash as directed, start amoxil and prednisone if not improving over next 3-5 days, or for worsening. Follow up if not resolving with treatment.   - (Magic mouthwash) 1:1:1 diphenhydramine(Benadryl) 12.5mg/5ml liq, aluminum & magnesium hydroxide-simethicone (Maalox), LIDOcaine viscous 2%; 10 ml gargle; every 6-8 hr prn sore throat  Dispense: 450 mL; Refill: 0  - amoxicillin (AMOXIL) 875 MG tablet; Take 1 tablet (875 mg total) by mouth every 12 (twelve) hours. for 7 days  Dispense: 14 tablet; Refill: 0  - predniSONE (DELTASONE) 20 MG tablet; Take 1 tablet (20 mg total) by mouth once daily. for 5 days  Dispense: 5 tablet; Refill: 0

## 2022-03-18 ENCOUNTER — PATIENT MESSAGE (OUTPATIENT)
Dept: ADMINISTRATIVE | Facility: HOSPITAL | Age: 47
End: 2022-03-18
Payer: COMMERCIAL

## 2022-03-30 ENCOUNTER — LAB VISIT (OUTPATIENT)
Dept: LAB | Facility: HOSPITAL | Age: 47
End: 2022-03-30
Attending: FAMILY MEDICINE
Payer: COMMERCIAL

## 2022-03-30 ENCOUNTER — OFFICE VISIT (OUTPATIENT)
Dept: FAMILY MEDICINE | Facility: CLINIC | Age: 47
End: 2022-03-30
Payer: COMMERCIAL

## 2022-03-30 VITALS
HEART RATE: 66 BPM | BODY MASS INDEX: 33.63 KG/M2 | HEIGHT: 61 IN | OXYGEN SATURATION: 96 % | WEIGHT: 178.13 LBS | SYSTOLIC BLOOD PRESSURE: 118 MMHG | DIASTOLIC BLOOD PRESSURE: 70 MMHG

## 2022-03-30 DIAGNOSIS — I10 ESSENTIAL HYPERTENSION: ICD-10-CM

## 2022-03-30 DIAGNOSIS — E78.00 PURE HYPERCHOLESTEROLEMIA: Primary | ICD-10-CM

## 2022-03-30 DIAGNOSIS — E78.00 PURE HYPERCHOLESTEROLEMIA: ICD-10-CM

## 2022-03-30 DIAGNOSIS — J45.20 MILD INTERMITTENT ASTHMA WITHOUT COMPLICATION: ICD-10-CM

## 2022-03-30 LAB
ALBUMIN SERPL BCP-MCNC: 4.2 G/DL (ref 3.5–5.2)
ALP SERPL-CCNC: 52 U/L (ref 55–135)
ALT SERPL W/O P-5'-P-CCNC: 14 U/L (ref 10–44)
ANION GAP SERPL CALC-SCNC: 12 MMOL/L (ref 8–16)
AST SERPL-CCNC: 22 U/L (ref 10–40)
BILIRUB SERPL-MCNC: 0.6 MG/DL (ref 0.1–1)
BUN SERPL-MCNC: 10 MG/DL (ref 6–20)
CALCIUM SERPL-MCNC: 9.7 MG/DL (ref 8.7–10.5)
CHLORIDE SERPL-SCNC: 103 MMOL/L (ref 95–110)
CHOLEST SERPL-MCNC: 283 MG/DL (ref 120–199)
CHOLEST/HDLC SERPL: 4.4 {RATIO} (ref 2–5)
CO2 SERPL-SCNC: 24 MMOL/L (ref 23–29)
CREAT SERPL-MCNC: 0.8 MG/DL (ref 0.5–1.4)
EST. GFR  (AFRICAN AMERICAN): >60 ML/MIN/1.73 M^2
EST. GFR  (NON AFRICAN AMERICAN): >60 ML/MIN/1.73 M^2
GLUCOSE SERPL-MCNC: 98 MG/DL (ref 70–110)
HDLC SERPL-MCNC: 64 MG/DL (ref 40–75)
HDLC SERPL: 22.6 % (ref 20–50)
LDLC SERPL CALC-MCNC: 197 MG/DL (ref 63–159)
NONHDLC SERPL-MCNC: 219 MG/DL
POTASSIUM SERPL-SCNC: 4.8 MMOL/L (ref 3.5–5.1)
PROT SERPL-MCNC: 7.9 G/DL (ref 6–8.4)
SODIUM SERPL-SCNC: 139 MMOL/L (ref 136–145)
TRIGL SERPL-MCNC: 110 MG/DL (ref 30–150)

## 2022-03-30 PROCEDURE — 99214 OFFICE O/P EST MOD 30 MIN: CPT | Mod: S$GLB,,, | Performed by: FAMILY MEDICINE

## 2022-03-30 PROCEDURE — 99999 PR PBB SHADOW E&M-EST. PATIENT-LVL III: CPT | Mod: PBBFAC,,, | Performed by: FAMILY MEDICINE

## 2022-03-30 PROCEDURE — 80061 LIPID PANEL: CPT | Performed by: FAMILY MEDICINE

## 2022-03-30 PROCEDURE — 3078F DIAST BP <80 MM HG: CPT | Mod: CPTII,S$GLB,, | Performed by: FAMILY MEDICINE

## 2022-03-30 PROCEDURE — 4010F PR ACE/ARB THEARPY RXD/TAKEN: ICD-10-PCS | Mod: CPTII,S$GLB,, | Performed by: FAMILY MEDICINE

## 2022-03-30 PROCEDURE — 3008F BODY MASS INDEX DOCD: CPT | Mod: CPTII,S$GLB,, | Performed by: FAMILY MEDICINE

## 2022-03-30 PROCEDURE — 3008F PR BODY MASS INDEX (BMI) DOCUMENTED: ICD-10-PCS | Mod: CPTII,S$GLB,, | Performed by: FAMILY MEDICINE

## 2022-03-30 PROCEDURE — 99999 PR PBB SHADOW E&M-EST. PATIENT-LVL III: ICD-10-PCS | Mod: PBBFAC,,, | Performed by: FAMILY MEDICINE

## 2022-03-30 PROCEDURE — 3074F SYST BP LT 130 MM HG: CPT | Mod: CPTII,S$GLB,, | Performed by: FAMILY MEDICINE

## 2022-03-30 PROCEDURE — 36415 COLL VENOUS BLD VENIPUNCTURE: CPT | Mod: PO | Performed by: FAMILY MEDICINE

## 2022-03-30 PROCEDURE — 1160F PR REVIEW ALL MEDS BY PRESCRIBER/CLIN PHARMACIST DOCUMENTED: ICD-10-PCS | Mod: CPTII,S$GLB,, | Performed by: FAMILY MEDICINE

## 2022-03-30 PROCEDURE — 3078F PR MOST RECENT DIASTOLIC BLOOD PRESSURE < 80 MM HG: ICD-10-PCS | Mod: CPTII,S$GLB,, | Performed by: FAMILY MEDICINE

## 2022-03-30 PROCEDURE — 4010F ACE/ARB THERAPY RXD/TAKEN: CPT | Mod: CPTII,S$GLB,, | Performed by: FAMILY MEDICINE

## 2022-03-30 PROCEDURE — 1159F MED LIST DOCD IN RCRD: CPT | Mod: CPTII,S$GLB,, | Performed by: FAMILY MEDICINE

## 2022-03-30 PROCEDURE — 99214 PR OFFICE/OUTPT VISIT, EST, LEVL IV, 30-39 MIN: ICD-10-PCS | Mod: S$GLB,,, | Performed by: FAMILY MEDICINE

## 2022-03-30 PROCEDURE — 1159F PR MEDICATION LIST DOCUMENTED IN MEDICAL RECORD: ICD-10-PCS | Mod: CPTII,S$GLB,, | Performed by: FAMILY MEDICINE

## 2022-03-30 PROCEDURE — 80053 COMPREHEN METABOLIC PANEL: CPT | Performed by: FAMILY MEDICINE

## 2022-03-30 PROCEDURE — 1160F RVW MEDS BY RX/DR IN RCRD: CPT | Mod: CPTII,S$GLB,, | Performed by: FAMILY MEDICINE

## 2022-03-30 PROCEDURE — 3074F PR MOST RECENT SYSTOLIC BLOOD PRESSURE < 130 MM HG: ICD-10-PCS | Mod: CPTII,S$GLB,, | Performed by: FAMILY MEDICINE

## 2022-03-30 RX ORDER — ALBUTEROL SULFATE 90 UG/1
2 AEROSOL, METERED RESPIRATORY (INHALATION) EVERY 6 HOURS PRN
Qty: 18 G | Refills: 5 | Status: SHIPPED | OUTPATIENT
Start: 2022-03-30 | End: 2023-05-16 | Stop reason: SDUPTHER

## 2022-03-30 RX ORDER — MONTELUKAST SODIUM 10 MG/1
10 TABLET ORAL NIGHTLY
Qty: 90 TABLET | Refills: 1 | Status: SHIPPED | OUTPATIENT
Start: 2022-03-30 | End: 2022-04-29

## 2022-03-30 RX ORDER — LOSARTAN POTASSIUM 25 MG/1
25 TABLET ORAL DAILY
Qty: 90 TABLET | Refills: 3 | Status: SHIPPED | OUTPATIENT
Start: 2022-03-30 | End: 2023-03-31 | Stop reason: SDUPTHER

## 2022-03-30 NOTE — PROGRESS NOTES
Subjective:       Patient ID: Vera De Leon is a 47 y.o. female.    Chief Complaint: Routine Health Maintenance    Pt is known to me.  The pt presents for annual wellness exam.    Review of Systems   Constitutional: Negative for activity change, appetite change, fatigue and unexpected weight change.   Eyes: Negative for visual disturbance.   Respiratory: Negative for cough, chest tightness and shortness of breath.    Cardiovascular: Negative for chest pain and palpitations.   Gastrointestinal: Negative for abdominal pain, constipation, diarrhea, nausea and vomiting.   Endocrine: Negative for cold intolerance, heat intolerance and polyuria.   Genitourinary: Negative for decreased urine volume and dysuria.   Musculoskeletal: Negative for arthralgias and back pain.   Skin: Negative for rash.   Neurological: Negative for dizziness, numbness and headaches.       Objective:      Physical Exam  Vitals and nursing note reviewed.   Constitutional:       Appearance: She is well-developed.   HENT:      Head: Normocephalic.   Eyes:      Conjunctiva/sclera: Conjunctivae normal.      Pupils: Pupils are equal, round, and reactive to light.   Neck:      Thyroid: No thyromegaly.   Cardiovascular:      Rate and Rhythm: Normal rate and regular rhythm.      Heart sounds: Normal heart sounds.   Pulmonary:      Effort: Pulmonary effort is normal.      Breath sounds: Normal breath sounds.   Abdominal:      General: Bowel sounds are normal.      Palpations: Abdomen is soft.      Tenderness: There is no abdominal tenderness.   Musculoskeletal:         General: No tenderness or deformity. Normal range of motion.      Cervical back: Normal range of motion and neck supple.   Lymphadenopathy:      Cervical: No cervical adenopathy.   Skin:     General: Skin is warm and dry.   Neurological:      Mental Status: She is alert and oriented to person, place, and time.      Cranial Nerves: No cranial nerve deficit.      Motor: No abnormal muscle  tone.      Coordination: Coordination normal.      Deep Tendon Reflexes: Reflexes normal.   Psychiatric:         Behavior: Behavior normal.         Assessment:       1. Pure hypercholesterolemia    2. Essential hypertension    3. Mild intermittent asthma without complication        Plan:       Vera was seen today for routine health maintenance.    Diagnoses and all orders for this visit:    Pure hypercholesterolemia  -     Lipid Panel; Future    Essential hypertension  -     losartan (COZAAR) 25 MG tablet; Take 1 tablet (25 mg total) by mouth once daily.  -     Comprehensive Metabolic Panel; Future    Mild intermittent asthma without complication  -     albuterol (PROAIR HFA) 90 mcg/actuation inhaler; Inhale 2 puffs into the lungs every 6 (six) hours as needed for Wheezing. Rescue  -     montelukast (SINGULAIR) 10 mg tablet; Take 1 tablet (10 mg total) by mouth every evening.      During this visit, I reviewed the pt's history, medications, allergies, and problem list.

## 2022-03-31 ENCOUNTER — PATIENT MESSAGE (OUTPATIENT)
Dept: FAMILY MEDICINE | Facility: CLINIC | Age: 47
End: 2022-03-31
Payer: COMMERCIAL

## 2022-03-31 DIAGNOSIS — E78.2 MIXED HYPERLIPIDEMIA: Primary | ICD-10-CM

## 2022-04-01 ENCOUNTER — PATIENT MESSAGE (OUTPATIENT)
Dept: FAMILY MEDICINE | Facility: CLINIC | Age: 47
End: 2022-04-01
Payer: COMMERCIAL

## 2022-04-01 DIAGNOSIS — R06.83 SNORING: ICD-10-CM

## 2022-04-01 DIAGNOSIS — G47.10 HYPERSOMNIA: ICD-10-CM

## 2022-04-01 DIAGNOSIS — E66.9 OBESITY (BMI 30.0-34.9): Primary | ICD-10-CM

## 2022-04-01 RX ORDER — ROSUVASTATIN CALCIUM 20 MG/1
20 TABLET, COATED ORAL DAILY
Qty: 90 TABLET | Refills: 3 | Status: SHIPPED | OUTPATIENT
Start: 2022-04-01 | End: 2023-03-22

## 2022-05-09 ENCOUNTER — PATIENT MESSAGE (OUTPATIENT)
Dept: SMOKING CESSATION | Facility: CLINIC | Age: 47
End: 2022-05-09
Payer: COMMERCIAL

## 2022-07-27 DIAGNOSIS — Z12.11 COLON CANCER SCREENING: ICD-10-CM

## 2022-07-29 ENCOUNTER — OFFICE VISIT (OUTPATIENT)
Dept: FAMILY MEDICINE | Facility: CLINIC | Age: 47
End: 2022-07-29
Payer: COMMERCIAL

## 2022-07-29 DIAGNOSIS — J01.40 ACUTE PANSINUSITIS, RECURRENCE NOT SPECIFIED: Primary | ICD-10-CM

## 2022-07-29 PROCEDURE — 4010F ACE/ARB THERAPY RXD/TAKEN: CPT | Mod: CPTII,95,, | Performed by: PHYSICIAN ASSISTANT

## 2022-07-29 PROCEDURE — 99213 OFFICE O/P EST LOW 20 MIN: CPT | Mod: 95,,, | Performed by: PHYSICIAN ASSISTANT

## 2022-07-29 PROCEDURE — 1160F RVW MEDS BY RX/DR IN RCRD: CPT | Mod: CPTII,95,, | Performed by: PHYSICIAN ASSISTANT

## 2022-07-29 PROCEDURE — 4010F PR ACE/ARB THEARPY RXD/TAKEN: ICD-10-PCS | Mod: CPTII,95,, | Performed by: PHYSICIAN ASSISTANT

## 2022-07-29 PROCEDURE — 99213 PR OFFICE/OUTPT VISIT, EST, LEVL III, 20-29 MIN: ICD-10-PCS | Mod: 95,,, | Performed by: PHYSICIAN ASSISTANT

## 2022-07-29 PROCEDURE — 1159F MED LIST DOCD IN RCRD: CPT | Mod: CPTII,95,, | Performed by: PHYSICIAN ASSISTANT

## 2022-07-29 PROCEDURE — 1160F PR REVIEW ALL MEDS BY PRESCRIBER/CLIN PHARMACIST DOCUMENTED: ICD-10-PCS | Mod: CPTII,95,, | Performed by: PHYSICIAN ASSISTANT

## 2022-07-29 PROCEDURE — 1159F PR MEDICATION LIST DOCUMENTED IN MEDICAL RECORD: ICD-10-PCS | Mod: CPTII,95,, | Performed by: PHYSICIAN ASSISTANT

## 2022-07-29 RX ORDER — CEFDINIR 300 MG/1
300 CAPSULE ORAL 2 TIMES DAILY
Qty: 14 CAPSULE | Refills: 0 | Status: SHIPPED | OUTPATIENT
Start: 2022-07-29 | End: 2022-08-05

## 2022-07-29 NOTE — PROGRESS NOTES
Subjective:      Patient ID: Vera De Leon is a 47 y.o. female.    Chief Complaint: Sinusitis  Patient is new to me.    HPI   Patient has PMH of asthma.    Patient reports ear pain, sore throat, rhinorrhea, left-sided sinus pressure,and neck pain since Wednesday.  Taken claritin and children's sudafed with some resolution of symptoms.  No sick contacts.  Had seasonal allergies for over a week prior to the visit.  Patient denies chest pain or shortness of breath.  She has not needed albuterol inhaler during this episode.    Review of Systems   Constitutional: Positive for chills. Negative for fever.   HENT: Positive for dental problem, ear discharge, ear pain (left), postnasal drip, rhinorrhea, sinus pressure (left-sided) and sinus pain. Negative for hearing loss and sore throat.    Eyes: Negative for pain.   Respiratory: Positive for cough (dry intermittent). Negative for shortness of breath.    Cardiovascular: Negative for chest pain.   Gastrointestinal: Negative for abdominal pain, constipation, diarrhea, nausea and vomiting.   Musculoskeletal: Negative for neck pain.   Skin: Negative for rash.   Allergic/Immunologic: Positive for environmental allergies.   Neurological: Negative for headaches.   Hematological: Positive for adenopathy (left cervical).       Objective:   There were no vitals taken for this visit.     Physical Exam  Constitutional:       Appearance: Normal appearance.   HENT:      Head: Normocephalic and atraumatic.      Right Ear: Hearing and external ear normal.      Left Ear: Hearing and external ear normal.      Nose:      Right Sinus: No maxillary sinus tenderness or frontal sinus tenderness.      Left Sinus: Maxillary sinus tenderness and frontal sinus tenderness present.      Mouth/Throat:      Lips: Pink.   Eyes:      General: Lids are normal.      Conjunctiva/sclera: Conjunctivae normal.   Neck:      Trachea: Phonation normal.   Pulmonary:      Effort: No respiratory distress.       Comments: Able to talk without labored breathing.  Neurological:      General: No focal deficit present.      Mental Status: She is alert and oriented to person, place, and time.   Psychiatric:         Mood and Affect: Mood normal.         Behavior: Behavior normal.         Judgment: Judgment normal.        Assessment:      1. Acute pansinusitis, recurrence not specified       Plan:   1. Acute pansinusitis, recurrence not specified  Take antibiotic with food.  Use Advil with food.  - cefdinir (OMNICEF) 300 MG capsule; Take 1 capsule (300 mg total) by mouth 2 (two) times daily. for 7 days  Dispense: 14 capsule; Refill: 0    Follow up as needed.  Patient agreed with plan and expressed understanding.  The patient location is:  Patient Home   The chief complaint leading to consultation is: sinusitis  Visit type: Virtual visit with synchronous audio and video  Total time spent with patient: 17 minutes  Each patient to whom he or she provides medical services by telemedicine is:  (1) informed of the relationship between the physician and patient and the respective role of any other health care provider with respect to management of the patient; and (2) notified that he or she may decline to receive medical services by telemedicine and may withdraw from such care at any time.

## 2022-08-01 ENCOUNTER — PATIENT MESSAGE (OUTPATIENT)
Dept: ADMINISTRATIVE | Facility: HOSPITAL | Age: 47
End: 2022-08-01
Payer: COMMERCIAL

## 2022-08-05 ENCOUNTER — PATIENT MESSAGE (OUTPATIENT)
Dept: FAMILY MEDICINE | Facility: CLINIC | Age: 47
End: 2022-08-05
Payer: COMMERCIAL

## 2022-12-14 ENCOUNTER — TELEPHONE (OUTPATIENT)
Dept: OBSTETRICS AND GYNECOLOGY | Facility: CLINIC | Age: 47
End: 2022-12-14
Payer: COMMERCIAL

## 2022-12-14 DIAGNOSIS — N92.6 IRREGULAR MENSES: Primary | ICD-10-CM

## 2022-12-14 RX ORDER — NORETHINDRONE 5 MG/1
5 TABLET ORAL DAILY
Qty: 21 TABLET | Refills: 6 | Status: SHIPPED | OUTPATIENT
Start: 2022-12-14 | End: 2023-03-02 | Stop reason: SDUPTHER

## 2022-12-14 NOTE — TELEPHONE ENCOUNTER
Called patient and discussed, irregular periods.   Will try Norethindrone 5mg daily x 3 weeks then one week off and resume.    ----- Message from Azra Villeda LPN sent at 12/7/2022  3:12 PM CST -----  Patient states she is having abnormal cycles sometimes 2 cycles in a month.  Patient had an EMB last year that resulted normal. Patient wants to know what she should do. She is also having back pain. Please advise.  ----- Message -----  From: John Kidd  Sent: 12/7/2022  10:41 AM CST  To: Avi COSBY Staff    Please call pt she is having abn cycle and back pains  [phone]

## 2023-02-01 ENCOUNTER — HOSPITAL ENCOUNTER (OUTPATIENT)
Dept: RADIOLOGY | Facility: OTHER | Age: 48
Discharge: HOME | End: 2023-02-01
Attending: OBSTETRICS & GYNECOLOGY
Payer: COMMERCIAL

## 2023-02-01 DIAGNOSIS — Z12.31 SCREENING MAMMOGRAM, ENCOUNTER FOR: ICD-10-CM

## 2023-02-01 PROCEDURE — 77067 MAMMO DIGITAL SCREENING BILAT WITH TOMO: ICD-10-PCS | Mod: 26,,, | Performed by: RADIOLOGY

## 2023-02-01 PROCEDURE — 77067 SCR MAMMO BI INCL CAD: CPT | Mod: TC

## 2023-02-01 PROCEDURE — 77063 BREAST TOMOSYNTHESIS BI: CPT | Mod: 26,,, | Performed by: RADIOLOGY

## 2023-02-01 PROCEDURE — 77067 SCR MAMMO BI INCL CAD: CPT | Mod: 26,,, | Performed by: RADIOLOGY

## 2023-02-01 PROCEDURE — 77063 MAMMO DIGITAL SCREENING BILAT WITH TOMO: ICD-10-PCS | Mod: 26,,, | Performed by: RADIOLOGY

## 2023-03-02 ENCOUNTER — OFFICE VISIT (OUTPATIENT)
Dept: OBSTETRICS AND GYNECOLOGY | Facility: CLINIC | Age: 48
End: 2023-03-02
Attending: OBSTETRICS & GYNECOLOGY
Payer: COMMERCIAL

## 2023-03-02 VITALS
DIASTOLIC BLOOD PRESSURE: 90 MMHG | HEIGHT: 61 IN | WEIGHT: 163.81 LBS | SYSTOLIC BLOOD PRESSURE: 144 MMHG | BODY MASS INDEX: 30.93 KG/M2

## 2023-03-02 DIAGNOSIS — Z01.419 ENCOUNTER FOR GYNECOLOGICAL EXAMINATION WITHOUT ABNORMAL FINDING: Primary | ICD-10-CM

## 2023-03-02 DIAGNOSIS — Z12.31 SCREENING MAMMOGRAM, ENCOUNTER FOR: ICD-10-CM

## 2023-03-02 DIAGNOSIS — N92.6 IRREGULAR MENSES: ICD-10-CM

## 2023-03-02 PROCEDURE — 1160F RVW MEDS BY RX/DR IN RCRD: CPT | Mod: CPTII,S$GLB,, | Performed by: OBSTETRICS & GYNECOLOGY

## 2023-03-02 PROCEDURE — 3008F BODY MASS INDEX DOCD: CPT | Mod: CPTII,S$GLB,, | Performed by: OBSTETRICS & GYNECOLOGY

## 2023-03-02 PROCEDURE — 3080F PR MOST RECENT DIASTOLIC BLOOD PRESSURE >= 90 MM HG: ICD-10-PCS | Mod: CPTII,S$GLB,, | Performed by: OBSTETRICS & GYNECOLOGY

## 2023-03-02 PROCEDURE — 3080F DIAST BP >= 90 MM HG: CPT | Mod: CPTII,S$GLB,, | Performed by: OBSTETRICS & GYNECOLOGY

## 2023-03-02 PROCEDURE — 1159F MED LIST DOCD IN RCRD: CPT | Mod: CPTII,S$GLB,, | Performed by: OBSTETRICS & GYNECOLOGY

## 2023-03-02 PROCEDURE — 4010F ACE/ARB THERAPY RXD/TAKEN: CPT | Mod: CPTII,S$GLB,, | Performed by: OBSTETRICS & GYNECOLOGY

## 2023-03-02 PROCEDURE — 99999 PR PBB SHADOW E&M-EST. PATIENT-LVL III: CPT | Mod: PBBFAC,,, | Performed by: OBSTETRICS & GYNECOLOGY

## 2023-03-02 PROCEDURE — 99396 PREV VISIT EST AGE 40-64: CPT | Mod: S$GLB,,, | Performed by: OBSTETRICS & GYNECOLOGY

## 2023-03-02 PROCEDURE — 99999 PR PBB SHADOW E&M-EST. PATIENT-LVL III: ICD-10-PCS | Mod: PBBFAC,,, | Performed by: OBSTETRICS & GYNECOLOGY

## 2023-03-02 PROCEDURE — 3077F SYST BP >= 140 MM HG: CPT | Mod: CPTII,S$GLB,, | Performed by: OBSTETRICS & GYNECOLOGY

## 2023-03-02 PROCEDURE — 1159F PR MEDICATION LIST DOCUMENTED IN MEDICAL RECORD: ICD-10-PCS | Mod: CPTII,S$GLB,, | Performed by: OBSTETRICS & GYNECOLOGY

## 2023-03-02 PROCEDURE — 3077F PR MOST RECENT SYSTOLIC BLOOD PRESSURE >= 140 MM HG: ICD-10-PCS | Mod: CPTII,S$GLB,, | Performed by: OBSTETRICS & GYNECOLOGY

## 2023-03-02 PROCEDURE — 1160F PR REVIEW ALL MEDS BY PRESCRIBER/CLIN PHARMACIST DOCUMENTED: ICD-10-PCS | Mod: CPTII,S$GLB,, | Performed by: OBSTETRICS & GYNECOLOGY

## 2023-03-02 PROCEDURE — 3008F PR BODY MASS INDEX (BMI) DOCUMENTED: ICD-10-PCS | Mod: CPTII,S$GLB,, | Performed by: OBSTETRICS & GYNECOLOGY

## 2023-03-02 PROCEDURE — 4010F PR ACE/ARB THEARPY RXD/TAKEN: ICD-10-PCS | Mod: CPTII,S$GLB,, | Performed by: OBSTETRICS & GYNECOLOGY

## 2023-03-02 PROCEDURE — 99396 PR PREVENTIVE VISIT,EST,40-64: ICD-10-PCS | Mod: S$GLB,,, | Performed by: OBSTETRICS & GYNECOLOGY

## 2023-03-02 RX ORDER — NORETHINDRONE 5 MG/1
5 TABLET ORAL DAILY
Qty: 21 TABLET | Refills: 12 | Status: SHIPPED | OUTPATIENT
Start: 2023-03-02 | End: 2024-03-07

## 2023-03-02 NOTE — PROGRESS NOTES
Subjective:       Patient ID: Vera De Leon is a 48 y.o. female.    Chief Complaint:  Annual Exam and Gynecologic Exam      History of Present Illness  Gynecologic Exam  Pertinent negatives include no abdominal pain, back pain or headaches.     Vera De Leon is a 48 y.o. female  here for her annual GYN exam.  Cycles are now well controlled and much lighter since cycling with Norethindrone.  She describes her periods as regular, normal flow, lasting 2-3 days. (Since being on Norethindrone)  denies break through bleeding.   denies vaginal itching or irritation.  Denies vaginal discharge.  She is sexually active. She has had1 partner for 26 years .  She uses bilateral tubal ligation for contraception.   History of abnormal pap: No  Last Pap: approximate date 2021 and was normal  Last MMG: normal--routine follow-up in 12 months  Last Colonoscopy:  NONE  denies domestic violence. She does feel safe at home.     Past Medical History:   Diagnosis Date    Allergy     Asthma     Chronic kidney disease     kidney stones     Past Surgical History:   Procedure Laterality Date    Breast reduction Bilateral      SECTION  1999; 3/2005    TOTAL REDUCTION MAMMOPLASTY      2002    TUBAL LIGATION  3/2005     Social History     Socioeconomic History    Marital status:    Tobacco Use    Smoking status: Never    Smokeless tobacco: Never   Substance and Sexual Activity    Alcohol use: No    Drug use: No     Comment: Tubal ligation    Sexual activity: Yes     Partners: Male     Birth control/protection: Surgical     Comment:  x 26 years since      Family History   Problem Relation Age of Onset    Hypertension Mother     Alzheimer's disease Paternal Grandmother     Breast cancer Neg Hx     Colon cancer Neg Hx     Ovarian cancer Neg Hx     Diabetes Neg Hx      OB History          2    Para   2    Term   2            AB        Living   2         SAB        IAB         "Ectopic        Multiple        Live Births   2                 BP (!) 144/90   Ht 5' 1" (1.549 m)   Wt 74.3 kg (163 lb 12.8 oz)   LMP 02/15/2023   BMI 30.95 kg/m²         GYN & OB History  Patient's last menstrual period was 02/15/2023.   Date of Last Pap: No result found    OB History    Para Term  AB Living   2 2 2     2   SAB IAB Ectopic Multiple Live Births           2      # Outcome Date GA Lbr Robert/2nd Weight Sex Delivery Anes PTL Lv   2 Term 05 40w0d  3.374 kg (7 lb 7 oz) F CS-LTranv EPI N CARI   1 Term 99 41w0d  4.082 kg (9 lb) F CS-LTranv EPI N CARI       Review of Systems  Review of Systems   Constitutional:  Negative for activity change, appetite change, fatigue and unexpected weight change.   HENT: Negative.     Eyes:  Negative for visual disturbance.   Respiratory:  Negative for shortness of breath and wheezing.    Cardiovascular:  Negative for chest pain, palpitations and leg swelling.   Gastrointestinal:  Negative for abdominal pain, bloating and blood in stool.   Endocrine: Negative for diabetes and hair loss.   Genitourinary:  Positive for decreased libido. Negative for bladder incontinence, dyspareunia, hot flashes and vaginal dryness.   Musculoskeletal:  Negative for back pain and joint swelling.   Integumentary:  Negative for acne, hair changes and nipple discharge.   Neurological:  Negative for headaches.   Hematological:  Does not bruise/bleed easily.   Psychiatric/Behavioral:  Negative for depression and sleep disturbance. The patient is not nervous/anxious.    Breast: Negative for mastodynia and nipple discharge        Objective:      Physical Exam:   Constitutional: She is oriented to person, place, and time. She appears well-developed and well-nourished.    HENT:   Head: Normocephalic and atraumatic.    Eyes: Pupils are equal, round, and reactive to light. EOM are normal.     Cardiovascular:  Normal rate and regular rhythm.             Pulmonary/Chest: Effort " normal and breath sounds normal.   BREASTS:  no mass, no tenderness, no deformity and no retraction. Right breast exhibits no inverted nipple, no mass, no nipple discharge, no skin change, no tenderness, no bleeding and no swelling. Left breast exhibits no inverted nipple, no mass, no nipple discharge, no skin change, no tenderness, no bleeding and no swelling. Breasts are symmetrical.      Bilateral breast reduction scars        Abdominal: Soft. Bowel sounds are normal.     Genitourinary:    Pelvic exam was performed with patient supine.      Genitourinary Comments: PELVIC: Normal external genitalia without lesions.  Normal hair distribution.  Adequate perineal body, normal urethral meatus.  Vagina moist and well rugated without lesions or discharge.  Cervix pink, without lesions, discharge or tenderness.  No significant cystocele or rectocele.  Bimanual exam shows uterus to be normal size, regular, mobile and nontender.  Adnexa without masses or tenderness.                   Musculoskeletal: Normal range of motion and moves all extremeties.       Neurological: She is alert and oriented to person, place, and time.    Skin: Skin is warm and dry.    Psychiatric: She has a normal mood and affect.            Assessment:        1. Encounter for gynecological examination without abnormal finding    2. Irregular menses    3. Screening mammogram, encounter for                Plan:        Problem List Items Addressed This Visit    None  Visit Diagnoses       Encounter for gynecological examination without abnormal finding    -  Primary    Irregular menses        Relevant Medications    norethindrone (AYGESTIN) 5 mg Tab    Screening mammogram, encounter for        Relevant Orders    Mammo Digital Screening Bilat w/ [facility]             Follow up in about 1 year (around 3/2/2024).

## 2023-03-22 ENCOUNTER — PATIENT MESSAGE (OUTPATIENT)
Dept: FAMILY MEDICINE | Facility: CLINIC | Age: 48
End: 2023-03-22
Payer: COMMERCIAL

## 2023-03-31 ENCOUNTER — OFFICE VISIT (OUTPATIENT)
Dept: FAMILY MEDICINE | Facility: CLINIC | Age: 48
End: 2023-03-31
Payer: COMMERCIAL

## 2023-03-31 ENCOUNTER — LAB VISIT (OUTPATIENT)
Dept: LAB | Facility: HOSPITAL | Age: 48
End: 2023-03-31
Attending: FAMILY MEDICINE
Payer: COMMERCIAL

## 2023-03-31 VITALS
DIASTOLIC BLOOD PRESSURE: 70 MMHG | SYSTOLIC BLOOD PRESSURE: 136 MMHG | HEART RATE: 75 BPM | OXYGEN SATURATION: 98 % | WEIGHT: 162.5 LBS | HEIGHT: 61 IN | BODY MASS INDEX: 30.68 KG/M2

## 2023-03-31 DIAGNOSIS — Z00.00 WELLNESS EXAMINATION: ICD-10-CM

## 2023-03-31 DIAGNOSIS — Z00.00 WELLNESS EXAMINATION: Primary | ICD-10-CM

## 2023-03-31 DIAGNOSIS — R06.83 SNORING: ICD-10-CM

## 2023-03-31 DIAGNOSIS — Z12.11 COLON CANCER SCREENING: ICD-10-CM

## 2023-03-31 DIAGNOSIS — E78.2 MIXED HYPERLIPIDEMIA: ICD-10-CM

## 2023-03-31 DIAGNOSIS — I10 ESSENTIAL HYPERTENSION: ICD-10-CM

## 2023-03-31 LAB
ALBUMIN SERPL BCP-MCNC: 4.3 G/DL (ref 3.5–5.2)
ALP SERPL-CCNC: 42 U/L (ref 55–135)
ALT SERPL W/O P-5'-P-CCNC: 21 U/L (ref 10–44)
ANION GAP SERPL CALC-SCNC: 13 MMOL/L (ref 8–16)
AST SERPL-CCNC: 21 U/L (ref 10–40)
BASOPHILS # BLD AUTO: 0.04 K/UL (ref 0–0.2)
BASOPHILS NFR BLD: 0.5 % (ref 0–1.9)
BILIRUB SERPL-MCNC: 0.4 MG/DL (ref 0.1–1)
BUN SERPL-MCNC: 8 MG/DL (ref 6–20)
CALCIUM SERPL-MCNC: 9.6 MG/DL (ref 8.7–10.5)
CHLORIDE SERPL-SCNC: 109 MMOL/L (ref 95–110)
CHOLEST SERPL-MCNC: 144 MG/DL (ref 120–199)
CHOLEST/HDLC SERPL: 3.3 {RATIO} (ref 2–5)
CO2 SERPL-SCNC: 20 MMOL/L (ref 23–29)
CREAT SERPL-MCNC: 0.8 MG/DL (ref 0.5–1.4)
DIFFERENTIAL METHOD: ABNORMAL
EOSINOPHIL # BLD AUTO: 0.1 K/UL (ref 0–0.5)
EOSINOPHIL NFR BLD: 1.6 % (ref 0–8)
ERYTHROCYTE [DISTWIDTH] IN BLOOD BY AUTOMATED COUNT: 12 % (ref 11.5–14.5)
EST. GFR  (NO RACE VARIABLE): >60 ML/MIN/1.73 M^2
ESTIMATED AVG GLUCOSE: 103 MG/DL (ref 68–131)
GLUCOSE SERPL-MCNC: 90 MG/DL (ref 70–110)
HBA1C MFR BLD: 5.2 % (ref 4–5.6)
HCT VFR BLD AUTO: 46 % (ref 37–48.5)
HDLC SERPL-MCNC: 43 MG/DL (ref 40–75)
HDLC SERPL: 29.9 % (ref 20–50)
HGB BLD-MCNC: 14.9 G/DL (ref 12–16)
IMM GRANULOCYTES # BLD AUTO: 0.02 K/UL (ref 0–0.04)
IMM GRANULOCYTES NFR BLD AUTO: 0.2 % (ref 0–0.5)
LDLC SERPL CALC-MCNC: 89.4 MG/DL (ref 63–159)
LYMPHOCYTES # BLD AUTO: 1.6 K/UL (ref 1–4.8)
LYMPHOCYTES NFR BLD: 19.8 % (ref 18–48)
MCH RBC QN AUTO: 30.1 PG (ref 27–31)
MCHC RBC AUTO-ENTMCNC: 32.4 G/DL (ref 32–36)
MCV RBC AUTO: 93 FL (ref 82–98)
MONOCYTES # BLD AUTO: 0.4 K/UL (ref 0.3–1)
MONOCYTES NFR BLD: 4.5 % (ref 4–15)
NEUTROPHILS # BLD AUTO: 6 K/UL (ref 1.8–7.7)
NEUTROPHILS NFR BLD: 73.4 % (ref 38–73)
NONHDLC SERPL-MCNC: 101 MG/DL
NRBC BLD-RTO: 0 /100 WBC
PLATELET # BLD AUTO: 325 K/UL (ref 150–450)
PMV BLD AUTO: 9.8 FL (ref 9.2–12.9)
POTASSIUM SERPL-SCNC: 4.5 MMOL/L (ref 3.5–5.1)
PROT SERPL-MCNC: 7.6 G/DL (ref 6–8.4)
RBC # BLD AUTO: 4.95 M/UL (ref 4–5.4)
SODIUM SERPL-SCNC: 142 MMOL/L (ref 136–145)
TRIGL SERPL-MCNC: 58 MG/DL (ref 30–150)
TSH SERPL DL<=0.005 MIU/L-ACNC: 1.62 UIU/ML (ref 0.4–4)
WBC # BLD AUTO: 8.23 K/UL (ref 3.9–12.7)

## 2023-03-31 PROCEDURE — 99396 PREV VISIT EST AGE 40-64: CPT | Mod: S$GLB,,, | Performed by: FAMILY MEDICINE

## 2023-03-31 PROCEDURE — 85025 COMPLETE CBC W/AUTO DIFF WBC: CPT | Performed by: FAMILY MEDICINE

## 2023-03-31 PROCEDURE — 4010F PR ACE/ARB THEARPY RXD/TAKEN: ICD-10-PCS | Mod: CPTII,S$GLB,, | Performed by: FAMILY MEDICINE

## 2023-03-31 PROCEDURE — 1159F PR MEDICATION LIST DOCUMENTED IN MEDICAL RECORD: ICD-10-PCS | Mod: CPTII,S$GLB,, | Performed by: FAMILY MEDICINE

## 2023-03-31 PROCEDURE — 84443 ASSAY THYROID STIM HORMONE: CPT | Performed by: FAMILY MEDICINE

## 2023-03-31 PROCEDURE — 1160F PR REVIEW ALL MEDS BY PRESCRIBER/CLIN PHARMACIST DOCUMENTED: ICD-10-PCS | Mod: CPTII,S$GLB,, | Performed by: FAMILY MEDICINE

## 2023-03-31 PROCEDURE — 80061 LIPID PANEL: CPT | Performed by: FAMILY MEDICINE

## 2023-03-31 PROCEDURE — 99396 PR PREVENTIVE VISIT,EST,40-64: ICD-10-PCS | Mod: S$GLB,,, | Performed by: FAMILY MEDICINE

## 2023-03-31 PROCEDURE — 3078F PR MOST RECENT DIASTOLIC BLOOD PRESSURE < 80 MM HG: ICD-10-PCS | Mod: CPTII,S$GLB,, | Performed by: FAMILY MEDICINE

## 2023-03-31 PROCEDURE — 1159F MED LIST DOCD IN RCRD: CPT | Mod: CPTII,S$GLB,, | Performed by: FAMILY MEDICINE

## 2023-03-31 PROCEDURE — 3075F PR MOST RECENT SYSTOLIC BLOOD PRESS GE 130-139MM HG: ICD-10-PCS | Mod: CPTII,S$GLB,, | Performed by: FAMILY MEDICINE

## 2023-03-31 PROCEDURE — 3075F SYST BP GE 130 - 139MM HG: CPT | Mod: CPTII,S$GLB,, | Performed by: FAMILY MEDICINE

## 2023-03-31 PROCEDURE — 99999 PR PBB SHADOW E&M-EST. PATIENT-LVL III: CPT | Mod: PBBFAC,,, | Performed by: FAMILY MEDICINE

## 2023-03-31 PROCEDURE — 3008F BODY MASS INDEX DOCD: CPT | Mod: CPTII,S$GLB,, | Performed by: FAMILY MEDICINE

## 2023-03-31 PROCEDURE — 3078F DIAST BP <80 MM HG: CPT | Mod: CPTII,S$GLB,, | Performed by: FAMILY MEDICINE

## 2023-03-31 PROCEDURE — 3008F PR BODY MASS INDEX (BMI) DOCUMENTED: ICD-10-PCS | Mod: CPTII,S$GLB,, | Performed by: FAMILY MEDICINE

## 2023-03-31 PROCEDURE — 36415 COLL VENOUS BLD VENIPUNCTURE: CPT | Mod: PO | Performed by: FAMILY MEDICINE

## 2023-03-31 PROCEDURE — 80053 COMPREHEN METABOLIC PANEL: CPT | Performed by: FAMILY MEDICINE

## 2023-03-31 PROCEDURE — 99999 PR PBB SHADOW E&M-EST. PATIENT-LVL III: ICD-10-PCS | Mod: PBBFAC,,, | Performed by: FAMILY MEDICINE

## 2023-03-31 PROCEDURE — 4010F ACE/ARB THERAPY RXD/TAKEN: CPT | Mod: CPTII,S$GLB,, | Performed by: FAMILY MEDICINE

## 2023-03-31 PROCEDURE — 83036 HEMOGLOBIN GLYCOSYLATED A1C: CPT | Performed by: FAMILY MEDICINE

## 2023-03-31 PROCEDURE — 1160F RVW MEDS BY RX/DR IN RCRD: CPT | Mod: CPTII,S$GLB,, | Performed by: FAMILY MEDICINE

## 2023-03-31 RX ORDER — LOSARTAN POTASSIUM 25 MG/1
25 TABLET ORAL DAILY
Qty: 90 TABLET | Refills: 3 | Status: SHIPPED | OUTPATIENT
Start: 2023-03-31 | End: 2024-04-01 | Stop reason: SDUPTHER

## 2023-03-31 RX ORDER — ROSUVASTATIN CALCIUM 20 MG/1
20 TABLET, COATED ORAL DAILY
Qty: 90 TABLET | Refills: 3 | Status: SHIPPED | OUTPATIENT
Start: 2023-03-31 | End: 2024-04-01 | Stop reason: SDUPTHER

## 2023-03-31 NOTE — PROGRESS NOTES
Subjective:       Patient ID: Vera De Leon is a 48 y.o. female.    Chief Complaint: Annual Exam    Pt is known to me.   The pt presents for annual wellness exam.  She has lost 10-15 pounds in the last year.  The pt is doing well in general.  She says that her  tells her she snores loudly every night.  She is not having trouble staying awake.  She cares for her 9 month old grand daughter.  She is having tightness in her left shoulder that she relates to lifting her.  She also reports numbness in her hands when she sleeps on her side.  Her neck and upper back are tired.  She is doing well regarding anxiety.  Discussed health maintenance with pt and will schedule appropriate studies and/or immunizations.     Review of Systems   Constitutional:  Negative for activity change, appetite change, fatigue and unexpected weight change.   Eyes:  Negative for visual disturbance.   Respiratory:  Negative for cough, chest tightness and shortness of breath.    Cardiovascular:  Negative for chest pain, palpitations and leg swelling.   Gastrointestinal:  Negative for abdominal pain, constipation, diarrhea, nausea and vomiting.   Endocrine: Negative for cold intolerance, heat intolerance and polyuria.   Genitourinary:  Negative for decreased urine volume and dysuria.   Musculoskeletal:  Negative for arthralgias and back pain.   Skin:  Negative for rash.   Neurological:  Negative for dizziness, numbness and headaches.     Objective:      Physical Exam  Vitals and nursing note reviewed.   Constitutional:       Appearance: Normal appearance. She is normal weight. She is not ill-appearing.   HENT:      Mouth/Throat:      Mouth: Mucous membranes are moist.      Pharynx: Oropharynx is clear.   Eyes:      Extraocular Movements: Extraocular movements intact.      Pupils: Pupils are equal, round, and reactive to light.   Neck:      Vascular: No carotid bruit.   Cardiovascular:      Rate and Rhythm: Normal rate and regular rhythm.       Pulses: Normal pulses.      Heart sounds: Normal heart sounds.   Pulmonary:      Effort: Pulmonary effort is normal.      Breath sounds: Normal breath sounds.   Abdominal:      General: There is no distension.      Palpations: Abdomen is soft. There is no mass.      Tenderness: There is no abdominal tenderness. There is no right CVA tenderness or left CVA tenderness.   Musculoskeletal:         General: No tenderness. Normal range of motion.      Cervical back: No tenderness.      Right lower leg: No edema.      Left lower leg: No edema.   Lymphadenopathy:      Cervical: No cervical adenopathy.   Skin:     General: Skin is warm and dry.   Neurological:      General: No focal deficit present.      Mental Status: She is alert and oriented to person, place, and time. Mental status is at baseline.   Psychiatric:         Mood and Affect: Mood normal.         Behavior: Behavior normal.       Assessment:       1. Wellness examination    2. Snoring    3. Essential hypertension    4. Mixed hyperlipidemia    5. Colon cancer screening        Plan:       Vera was seen today for annual exam.    Diagnoses and all orders for this visit:    Wellness examination  -     CBC Auto Differential; Future  -     Comprehensive Metabolic Panel; Future  -     Hemoglobin A1C; Future  -     Lipid Panel; Future  -     TSH; Future    Snoring  -     Home Sleep Study; Future    Essential hypertension  -     losartan (COZAAR) 25 MG tablet; Take 1 tablet (25 mg total) by mouth once daily.    Mixed hyperlipidemia  -     rosuvastatin (CRESTOR) 20 MG tablet; Take 1 tablet (20 mg total) by mouth once daily.    Colon cancer screening  -     Case Request Endoscopy: COLONOSCOPY      During this visit, I reviewed the pt's history, medications, allergies, and problem list.         no

## 2023-04-11 ENCOUNTER — PATIENT MESSAGE (OUTPATIENT)
Dept: ADMINISTRATIVE | Facility: HOSPITAL | Age: 48
End: 2023-04-11
Payer: COMMERCIAL

## 2023-05-11 ENCOUNTER — TELEPHONE (OUTPATIENT)
Dept: GASTROENTEROLOGY | Facility: CLINIC | Age: 48
End: 2023-05-11
Payer: COMMERCIAL

## 2023-05-16 DIAGNOSIS — J45.20 MILD INTERMITTENT ASTHMA WITHOUT COMPLICATION: ICD-10-CM

## 2023-05-16 RX ORDER — ALBUTEROL SULFATE 90 UG/1
2 AEROSOL, METERED RESPIRATORY (INHALATION) EVERY 6 HOURS PRN
Qty: 54 G | Refills: 3 | Status: SHIPPED | OUTPATIENT
Start: 2023-05-16

## 2023-05-16 NOTE — TELEPHONE ENCOUNTER
No care due was identified.  [facility] Embedded Care Due Messages. Reference number: [id number].   5/16/2023 5:16:59 PM CDT

## 2023-05-30 ENCOUNTER — TELEPHONE (OUTPATIENT)
Dept: GASTROENTEROLOGY | Facility: CLINIC | Age: 48
End: 2023-05-30
Payer: COMMERCIAL

## 2023-05-30 NOTE — TELEPHONE ENCOUNTER
Called pt in regards to scheduling, pt wants to speak with surgery center in regards to anesthesia as she is allergic to sedation. Surgery center notified.

## 2023-05-31 ENCOUNTER — TELEPHONE (OUTPATIENT)
Dept: SURGERY | Facility: HOSPITAL | Age: 48
End: 2023-05-31
Payer: COMMERCIAL

## 2023-05-31 ENCOUNTER — TELEPHONE (OUTPATIENT)
Dept: GASTROENTEROLOGY | Facility: CLINIC | Age: 48
End: 2023-05-31
Payer: COMMERCIAL

## 2023-05-31 NOTE — TELEPHONE ENCOUNTER
----- Message from Lottie Doan sent at 5/31/2023  8:37 AM CDT -----  Contact: pt [phone]  Type: Needs Medical Advice  Who Called:  pt  Best Call Back Number: [phone]    Additional Information: Pt is calling the office wanting to cancel colonoscopy, she doesn't recall wanting to schedule an aria.Please call back and advise.

## 2023-05-31 NOTE — TELEPHONE ENCOUNTER
Spoke with pt. Discussed why she had a procedure scheduled (needed for [facility] to call her to anesthesia concern). Pt verbalized understanding

## 2023-05-31 NOTE — TELEPHONE ENCOUNTER
Good afternoon,     I spoke with Ms. Bourgeois today about her allergy to propofol. I questioned her regarding the nature of her allergy and explained options for alternative sedation here at [facility]. I also informed her that she would have ample time to discuss sedation options with our anesthesia team prior to any procedure.     She states that, due to her extreme anxiety regarding scheduling a procedure and previous traumatic experience at an outpatient surgery center, she would like to eventually schedule her colonoscopy in a hospital setting.     I informed her that I will place her case in the depot. Once Ms. Bourgeois is ready to schedule a date for her colonoscopy, she will call the endoscopy clinic. She states she is not ready to actually schedule a date as of today. I did inform her that our endo providers are scheduling procedures for about 4 months out as of today. She verbalized understanding of this.     Please refer to my note for further detail of our conversation. Please let me know if you have any further questions.     Thank you!  Shae

## 2023-08-17 ENCOUNTER — PATIENT MESSAGE (OUTPATIENT)
Dept: FAMILY MEDICINE | Facility: CLINIC | Age: 48
End: 2023-08-17
Payer: COMMERCIAL

## 2023-08-17 DIAGNOSIS — J45.20 MILD INTERMITTENT ASTHMA WITHOUT COMPLICATION: Primary | ICD-10-CM

## 2023-08-17 RX ORDER — ALBUTEROL SULFATE 0.83 MG/ML
2.5 SOLUTION RESPIRATORY (INHALATION) EVERY 6 HOURS PRN
Qty: 150 ML | Refills: 2 | Status: SHIPPED | OUTPATIENT
Start: 2023-08-17 | End: 2024-08-16

## 2023-08-17 NOTE — TELEPHONE ENCOUNTER
Pt had albuterol (ACCUNEB) 0.63 mg/3 mL Nebu  on 3/10/21.  No longer in her med list.    LOV 3/31/23, NOV 24

## 2023-11-17 DIAGNOSIS — B96.89 BACTERIAL SINUSITIS: Primary | ICD-10-CM

## 2023-11-17 DIAGNOSIS — J32.9 BACTERIAL SINUSITIS: Primary | ICD-10-CM

## 2023-11-17 RX ORDER — AMOXICILLIN AND CLAVULANATE POTASSIUM 875; 125 MG/1; MG/1
1 TABLET, FILM COATED ORAL 2 TIMES DAILY
Qty: 20 TABLET | Refills: 0 | Status: SHIPPED | OUTPATIENT
Start: 2023-11-17 | End: 2023-11-27

## 2024-03-07 DIAGNOSIS — N92.6 IRREGULAR MENSES: ICD-10-CM

## 2024-03-07 RX ORDER — NORETHINDRONE 5 MG/1
TABLET ORAL
Qty: 63 TABLET | Refills: 0 | Status: SHIPPED | OUTPATIENT
Start: 2024-03-07 | End: 2024-03-18 | Stop reason: SDUPTHER

## 2024-03-07 NOTE — TELEPHONE ENCOUNTER
Refill Decision Note   Vera De Leon  is requesting a refill authorization.  Brief Assessment and Rationale for Refill:  Approve     Medication Therapy Plan:        Comments:     Note composed:5:13 PM 03/07/2024

## 2024-03-12 ENCOUNTER — HOSPITAL ENCOUNTER (OUTPATIENT)
Dept: RADIOLOGY | Facility: OTHER | Age: 49
Discharge: HOME | End: 2024-03-12
Attending: OBSTETRICS & GYNECOLOGY
Payer: COMMERCIAL

## 2024-03-12 VITALS — BODY MASS INDEX: 30.61 KG/M2 | WEIGHT: 162 LBS

## 2024-03-12 DIAGNOSIS — Z12.31 SCREENING MAMMOGRAM, ENCOUNTER FOR: ICD-10-CM

## 2024-03-12 PROCEDURE — 77067 SCR MAMMO BI INCL CAD: CPT | Mod: TC

## 2024-03-12 PROCEDURE — 77063 BREAST TOMOSYNTHESIS BI: CPT | Mod: 26,,, | Performed by: RADIOLOGY

## 2024-03-12 PROCEDURE — 77067 SCR MAMMO BI INCL CAD: CPT | Mod: 26,,, | Performed by: RADIOLOGY

## 2024-03-18 ENCOUNTER — OFFICE VISIT (OUTPATIENT)
Dept: OBSTETRICS AND GYNECOLOGY | Facility: CLINIC | Age: 49
End: 2024-03-18
Payer: COMMERCIAL

## 2024-03-18 ENCOUNTER — LAB VISIT (OUTPATIENT)
Dept: LAB | Facility: HOSPITAL | Age: 49
End: 2024-03-18
Attending: OBSTETRICS & GYNECOLOGY
Payer: COMMERCIAL

## 2024-03-18 VITALS
HEIGHT: 61 IN | WEIGHT: 169.31 LBS | SYSTOLIC BLOOD PRESSURE: 157 MMHG | BODY MASS INDEX: 31.97 KG/M2 | DIASTOLIC BLOOD PRESSURE: 88 MMHG

## 2024-03-18 DIAGNOSIS — N92.6 IRREGULAR MENSES: ICD-10-CM

## 2024-03-18 DIAGNOSIS — Z12.4 ENCOUNTER FOR PAPANICOLAOU SMEAR FOR CERVICAL CANCER SCREENING: ICD-10-CM

## 2024-03-18 DIAGNOSIS — Z01.419 ENCOUNTER FOR GYNECOLOGICAL EXAMINATION WITHOUT ABNORMAL FINDING: Primary | ICD-10-CM

## 2024-03-18 LAB
ESTRADIOL SERPL-MCNC: 143 PG/ML
FSH SERPL-ACNC: 9.92 MIU/ML

## 2024-03-18 PROCEDURE — 1159F MED LIST DOCD IN RCRD: CPT | Mod: CPTII,S$GLB,, | Performed by: OBSTETRICS & GYNECOLOGY

## 2024-03-18 PROCEDURE — 99396 PREV VISIT EST AGE 40-64: CPT | Mod: S$GLB,,, | Performed by: OBSTETRICS & GYNECOLOGY

## 2024-03-18 PROCEDURE — 3008F BODY MASS INDEX DOCD: CPT | Mod: CPTII,S$GLB,, | Performed by: OBSTETRICS & GYNECOLOGY

## 2024-03-18 PROCEDURE — 1160F RVW MEDS BY RX/DR IN RCRD: CPT | Mod: CPTII,S$GLB,, | Performed by: OBSTETRICS & GYNECOLOGY

## 2024-03-18 PROCEDURE — 82166 ASSAY ANTI-MULLERIAN HORM: CPT | Performed by: OBSTETRICS & GYNECOLOGY

## 2024-03-18 PROCEDURE — 82670 ASSAY OF TOTAL ESTRADIOL: CPT | Performed by: OBSTETRICS & GYNECOLOGY

## 2024-03-18 PROCEDURE — 83001 ASSAY OF GONADOTROPIN (FSH): CPT | Performed by: OBSTETRICS & GYNECOLOGY

## 2024-03-18 PROCEDURE — 87624 HPV HI-RISK TYP POOLED RSLT: CPT | Performed by: OBSTETRICS & GYNECOLOGY

## 2024-03-18 PROCEDURE — 99999 PR PBB SHADOW E&M-EST. PATIENT-LVL III: CPT | Mod: PBBFAC,,, | Performed by: OBSTETRICS & GYNECOLOGY

## 2024-03-18 PROCEDURE — 3077F SYST BP >= 140 MM HG: CPT | Mod: CPTII,S$GLB,, | Performed by: OBSTETRICS & GYNECOLOGY

## 2024-03-18 PROCEDURE — 4010F ACE/ARB THERAPY RXD/TAKEN: CPT | Mod: CPTII,S$GLB,, | Performed by: OBSTETRICS & GYNECOLOGY

## 2024-03-18 PROCEDURE — 3079F DIAST BP 80-89 MM HG: CPT | Mod: CPTII,S$GLB,, | Performed by: OBSTETRICS & GYNECOLOGY

## 2024-03-18 PROCEDURE — 88175 CYTOPATH C/V AUTO FLUID REDO: CPT | Performed by: OBSTETRICS & GYNECOLOGY

## 2024-03-18 RX ORDER — NORETHINDRONE 5 MG/1
TABLET ORAL
Qty: 63 TABLET | Refills: 3 | Status: SHIPPED | OUTPATIENT
Start: 2024-03-18

## 2024-03-18 NOTE — PROGRESS NOTES
Subjective:       Patient ID: Vera De Leon is a 49 y.o. female.    Chief Complaint:  Annual Exam and Gynecologic Exam      History of Present Illness  Gynecologic Exam  Pertinent negatives include no abdominal pain, back pain or headaches. There is no history of menorrhagia.       Vera De Leon is a 49 y.o. female  here for her annual GYN exam.  She reports that she skipped 2 withdrawal bleeds on the norethindrone -skipped December and January. February was a light cycle and this month had normal withdrawal but with bad cramps. She also reports weight gain more recently in spite of exercising regularly, walks 4 miles daily and goes to the gym . Feels like she eats a healthy diet.   She has been on Norethindrone to control cycles for the past 2 years, had hot flashes December and January.  denies break through bleeding.   denies vaginal itching or irritation.  Denies vaginal discharge.  She is sexually active. She has had1 partner for the past 27 years .  She uses oral progesterone-only contraceptive for contraception.   History of abnormal pap: No  Last Pap: approximate date 2021 and was normal(and negative for HR HPV)  Last MMG: normal-: BI-RADS Category 1: Negative -routine follow-up in 12 months  Last Colonoscopy:  NA  denies domestic violence. She does feel safe at home.     Past Medical History:   Diagnosis Date    Allergy     Asthma     Chronic kidney disease     kidney stones     Past Surgical History:   Procedure Laterality Date    Breast reduction Bilateral      SECTION  1999; 3/2005    TOTAL REDUCTION MAMMOPLASTY          TUBAL LIGATION  3/2005     Social History     Socioeconomic History    Marital status:    Tobacco Use    Smoking status: Never    Smokeless tobacco: Never   Substance and Sexual Activity    Alcohol use: No    Drug use: No     Comment: Tubal ligation    Sexual activity: Yes     Partners: Male     Birth control/protection:  "Surgical     Comment:  x 27 years since      Social Determinants of Health     Financial Resource Strain: Low Risk  (3/11/2024)    Overall Financial Resource Strain (CARDIA)     Difficulty of Paying Living Expenses: Not hard at all   Food Insecurity: No Food Insecurity (3/11/2024)    Hunger Vital Sign     Worried About Running Out of Food in the Last Year: Never true     Ran Out of Food in the Last Year: Never true   Transportation Needs: No Transportation Needs (3/11/2024)    PRAPARE - Transportation     Lack of Transportation (Medical): No     Lack of Transportation (Non-Medical): No   Physical Activity: Sufficiently Active (3/11/2024)    Exercise Vital Sign     Days of Exercise per Week: 4 days     Minutes of Exercise per Session: 60 min   Stress: No Stress Concern Present (3/11/2024)    [employer] [place] [employer] - Occupational Stress Questionnaire     Feeling of Stress : Only a little   Social Connections: Unknown (3/11/2024)    Social Connection and Isolation Panel [NHANES]     Frequency of Communication with Friends and Family: More than three times a week     Frequency of Social Gatherings with Friends and Family: Once a week     Active Member of Clubs or Organizations: No     Attends Club or Organization Meetings: Never     Marital Status:    Housing Stability: High Risk (3/11/2024)    Housing Stability Vital Sign     Unable to Pay for Housing in the Last Year: Yes     Number of Places Lived in the Last Year: 1     Unstable Housing in the Last Year: No     Family History   Problem Relation Age of Onset    Hypertension Mother     Alzheimer's disease Paternal Grandmother     Breast cancer Neg Hx     Colon cancer Neg Hx     Ovarian cancer Neg Hx     Diabetes Neg Hx      OB History          2    Para   2    Term   2            AB        Living   2         SAB        IAB        Ectopic        Multiple        Live Births   2                 BP (!) 157/88   Ht 5' 1" " (1.549 m)   Wt 76.8 kg (169 lb 5 oz)   LMP 2024   BMI 31.99 kg/m²         GYN & OB History  Patient's last menstrual period was 2024.   Date of Last Pap: No result found    OB History    Para Term  AB Living   2 2 2     2   SAB IAB Ectopic Multiple Live Births           2      # Outcome Date GA Lbr Robert/2nd Weight Sex Delivery Anes PTL Lv   2 Term 05 40w0d  3.374 kg (7 lb 7 oz) F CS-LTranv EPI N CARI   1 Term 99 41w0d  4.082 kg (9 lb) F CS-LTranv EPI N CARI       Review of Systems  Review of Systems   Constitutional:  Negative for activity change, appetite change, fatigue and unexpected weight change.   HENT: Negative.     Eyes:  Negative for visual disturbance.   Respiratory:  Negative for shortness of breath and wheezing.    Cardiovascular:  Negative for chest pain, palpitations and leg swelling.   Gastrointestinal:  Negative for abdominal pain, bloating and blood in stool.   Endocrine: Negative for diabetes and hair loss.   Genitourinary:  Negative for decreased libido, dyspareunia and menorrhagia.   Musculoskeletal:  Negative for back pain and joint swelling.   Integumentary:  Negative for acne, hair changes and nipple discharge.   Neurological:  Negative for headaches.   Hematological:  Does not bruise/bleed easily.   Psychiatric/Behavioral:  Negative for depression and sleep disturbance. The patient is not nervous/anxious.    Breast: Negative for mastodynia and nipple discharge          Objective:      Physical Exam:   Constitutional: She is oriented to person, place, and time. She appears well-developed and well-nourished.    HENT:   Head: Normocephalic and atraumatic.    Eyes: Pupils are equal, round, and reactive to light. EOM are normal.     Cardiovascular:  Normal rate and regular rhythm.             Pulmonary/Chest: Effort normal and breath sounds normal.   BREASTS:  no mass, no tenderness, no deformity and no retraction. Right breast exhibits no inverted nipple, no  mass, no nipple discharge, no skin change, no tenderness, no bleeding and no swelling. Left breast exhibits no inverted nipple, no mass, no nipple discharge, no skin change, no tenderness, no bleeding and no swelling. Breasts are symmetrical.      Bilateral reduction scars        Abdominal: Soft. Bowel sounds are normal.     Genitourinary:    Pelvic exam was performed with patient supine.      Genitourinary Comments: PELVIC: Normal external genitalia without lesions.  Normal hair distribution.  Adequate perineal body, normal urethral meatus.  Vagina moist and well rugated without lesions or discharge.  Cervix pink, Menstruating, without lesions, discharge or tenderness.  No significant cystocele or rectocele.  Bimanual exam shows uterus to be normal size, regular, mobile and nontender.  Adnexa without masses or tenderness.                   Musculoskeletal: Normal range of motion and moves all extremeties.       Neurological: She is alert and oriented to person, place, and time.    Skin: Skin is warm and dry.    Psychiatric: She has a normal mood and affect.              Assessment:        1. Encounter for gynecological examination without abnormal finding    2. Encounter for Papanicolaou smear for cervical cancer screening    3. Irregular menses                Plan:        Problem List Items Addressed This Visit    None  Visit Diagnoses       Encounter for gynecological examination without abnormal finding    -  Primary    Encounter for Papanicolaou smear for cervical cancer screening        Relevant Orders    Liquid-Based Pap Smear, Screening    HPV High Risk Genotypes, PCR    Irregular menses        Relevant Medications    norethindrone (AYGESTIN) 5 mg Tab    Other Relevant Orders    Follicle Stimulating Hormone    ANTIMULLERIAN HORMONE ([facility])    Estradiol             Follow up in about 1 year (around 3/18/2025).

## 2024-03-19 LAB — MIS SERPL-MCNC: 0.09 NG/ML

## 2024-03-23 LAB
HPV HR 12 DNA SPEC QL NAA+PROBE: NEGATIVE
HPV16 AG SPEC QL: NEGATIVE
HPV18 DNA SPEC QL NAA+PROBE: NEGATIVE

## 2024-03-25 LAB
FINAL PATHOLOGIC DIAGNOSIS: NORMAL
Lab: NORMAL

## 2024-04-01 ENCOUNTER — LAB VISIT (OUTPATIENT)
Dept: LAB | Facility: HOSPITAL | Age: 49
End: 2024-04-01
Attending: FAMILY MEDICINE
Payer: COMMERCIAL

## 2024-04-01 ENCOUNTER — OFFICE VISIT (OUTPATIENT)
Dept: FAMILY MEDICINE | Facility: CLINIC | Age: 49
End: 2024-04-01
Payer: COMMERCIAL

## 2024-04-01 VITALS
HEIGHT: 61 IN | WEIGHT: 171.06 LBS | BODY MASS INDEX: 32.3 KG/M2 | OXYGEN SATURATION: 98 % | HEART RATE: 74 BPM | SYSTOLIC BLOOD PRESSURE: 124 MMHG | DIASTOLIC BLOOD PRESSURE: 78 MMHG

## 2024-04-01 DIAGNOSIS — E78.2 MIXED HYPERLIPIDEMIA: ICD-10-CM

## 2024-04-01 DIAGNOSIS — Z00.00 WELLNESS EXAMINATION: Primary | ICD-10-CM

## 2024-04-01 DIAGNOSIS — Z00.00 WELLNESS EXAMINATION: ICD-10-CM

## 2024-04-01 DIAGNOSIS — I10 ESSENTIAL HYPERTENSION: ICD-10-CM

## 2024-04-01 LAB
ALBUMIN SERPL BCP-MCNC: 4.2 G/DL (ref 3.5–5.2)
ALP SERPL-CCNC: 45 U/L (ref 55–135)
ALT SERPL W/O P-5'-P-CCNC: 17 U/L (ref 10–44)
ANION GAP SERPL CALC-SCNC: 9 MMOL/L (ref 8–16)
AST SERPL-CCNC: 20 U/L (ref 10–40)
BASOPHILS # BLD AUTO: 0.04 K/UL (ref 0–0.2)
BASOPHILS NFR BLD: 0.5 % (ref 0–1.9)
BILIRUB SERPL-MCNC: 0.5 MG/DL (ref 0.1–1)
BUN SERPL-MCNC: 10 MG/DL (ref 6–20)
CALCIUM SERPL-MCNC: 9.9 MG/DL (ref 8.7–10.5)
CHLORIDE SERPL-SCNC: 111 MMOL/L (ref 95–110)
CHOLEST SERPL-MCNC: 168 MG/DL (ref 120–199)
CHOLEST/HDLC SERPL: 3.7 {RATIO} (ref 2–5)
CO2 SERPL-SCNC: 22 MMOL/L (ref 23–29)
CREAT SERPL-MCNC: 1 MG/DL (ref 0.5–1.4)
DIFFERENTIAL METHOD BLD: NORMAL
EOSINOPHIL # BLD AUTO: 0.2 K/UL (ref 0–0.5)
EOSINOPHIL NFR BLD: 2.3 % (ref 0–8)
ERYTHROCYTE [DISTWIDTH] IN BLOOD BY AUTOMATED COUNT: 12.2 % (ref 11.5–14.5)
EST. GFR  (NO RACE VARIABLE): >60 ML/MIN/1.73 M^2
ESTIMATED AVG GLUCOSE: 100 MG/DL (ref 68–131)
GLUCOSE SERPL-MCNC: 92 MG/DL (ref 70–110)
HBA1C MFR BLD: 5.1 % (ref 4–5.6)
HCT VFR BLD AUTO: 45.8 % (ref 37–48.5)
HDLC SERPL-MCNC: 46 MG/DL (ref 40–75)
HDLC SERPL: 27.4 % (ref 20–50)
HGB BLD-MCNC: 15.2 G/DL (ref 12–16)
IMM GRANULOCYTES # BLD AUTO: 0.02 K/UL (ref 0–0.04)
IMM GRANULOCYTES NFR BLD AUTO: 0.3 % (ref 0–0.5)
LDLC SERPL CALC-MCNC: 107.8 MG/DL (ref 63–159)
LYMPHOCYTES # BLD AUTO: 1.9 K/UL (ref 1–4.8)
LYMPHOCYTES NFR BLD: 25 % (ref 18–48)
MCH RBC QN AUTO: 30.5 PG (ref 27–31)
MCHC RBC AUTO-ENTMCNC: 33.2 G/DL (ref 32–36)
MCV RBC AUTO: 92 FL (ref 82–98)
MONOCYTES # BLD AUTO: 0.3 K/UL (ref 0.3–1)
MONOCYTES NFR BLD: 4.6 % (ref 4–15)
NEUTROPHILS # BLD AUTO: 5 K/UL (ref 1.8–7.7)
NEUTROPHILS NFR BLD: 67.3 % (ref 38–73)
NONHDLC SERPL-MCNC: 122 MG/DL
NRBC BLD-RTO: 0 /100 WBC
PLATELET # BLD AUTO: 339 K/UL (ref 150–450)
PMV BLD AUTO: 9.6 FL (ref 9.2–12.9)
POTASSIUM SERPL-SCNC: 5.5 MMOL/L (ref 3.5–5.1)
PROT SERPL-MCNC: 7.4 G/DL (ref 6–8.4)
RBC # BLD AUTO: 4.98 M/UL (ref 4–5.4)
SODIUM SERPL-SCNC: 142 MMOL/L (ref 136–145)
TRIGL SERPL-MCNC: 71 MG/DL (ref 30–150)
TSH SERPL DL<=0.005 MIU/L-ACNC: 1.48 UIU/ML (ref 0.4–4)
WBC # BLD AUTO: 7.47 K/UL (ref 3.9–12.7)

## 2024-04-01 PROCEDURE — 84443 ASSAY THYROID STIM HORMONE: CPT | Performed by: FAMILY MEDICINE

## 2024-04-01 PROCEDURE — 3078F DIAST BP <80 MM HG: CPT | Mod: CPTII,S$GLB,, | Performed by: FAMILY MEDICINE

## 2024-04-01 PROCEDURE — 99396 PREV VISIT EST AGE 40-64: CPT | Mod: S$GLB,,, | Performed by: FAMILY MEDICINE

## 2024-04-01 PROCEDURE — 83036 HEMOGLOBIN GLYCOSYLATED A1C: CPT | Performed by: FAMILY MEDICINE

## 2024-04-01 PROCEDURE — 80061 LIPID PANEL: CPT | Performed by: FAMILY MEDICINE

## 2024-04-01 PROCEDURE — 99999 PR PBB SHADOW E&M-EST. PATIENT-LVL III: CPT | Mod: PBBFAC,,, | Performed by: FAMILY MEDICINE

## 2024-04-01 PROCEDURE — 80053 COMPREHEN METABOLIC PANEL: CPT | Performed by: FAMILY MEDICINE

## 2024-04-01 PROCEDURE — 1160F RVW MEDS BY RX/DR IN RCRD: CPT | Mod: CPTII,S$GLB,, | Performed by: FAMILY MEDICINE

## 2024-04-01 PROCEDURE — 36415 COLL VENOUS BLD VENIPUNCTURE: CPT | Mod: PO | Performed by: FAMILY MEDICINE

## 2024-04-01 PROCEDURE — 3074F SYST BP LT 130 MM HG: CPT | Mod: CPTII,S$GLB,, | Performed by: FAMILY MEDICINE

## 2024-04-01 PROCEDURE — 4010F ACE/ARB THERAPY RXD/TAKEN: CPT | Mod: CPTII,S$GLB,, | Performed by: FAMILY MEDICINE

## 2024-04-01 PROCEDURE — 3008F BODY MASS INDEX DOCD: CPT | Mod: CPTII,S$GLB,, | Performed by: FAMILY MEDICINE

## 2024-04-01 PROCEDURE — 1159F MED LIST DOCD IN RCRD: CPT | Mod: CPTII,S$GLB,, | Performed by: FAMILY MEDICINE

## 2024-04-01 PROCEDURE — 85025 COMPLETE CBC W/AUTO DIFF WBC: CPT | Performed by: FAMILY MEDICINE

## 2024-04-01 RX ORDER — LOSARTAN POTASSIUM 25 MG/1
25 TABLET ORAL DAILY
Qty: 90 TABLET | Refills: 3 | Status: SHIPPED | OUTPATIENT
Start: 2024-04-01 | End: 2025-04-01

## 2024-04-01 RX ORDER — ROSUVASTATIN CALCIUM 20 MG/1
20 TABLET, COATED ORAL DAILY
Qty: 90 TABLET | Refills: 3 | Status: SHIPPED | OUTPATIENT
Start: 2024-04-01

## 2024-04-01 NOTE — PROGRESS NOTES
Subjective:       Patient ID: Vera De Leon is a 49 y.o. female.    Chief Complaint: Annual Exam    Pt is known to me.  The pt presents for annual wellness exam.  The pt is doing well in general with no acute complaints.  The pt is concerned about her weight.  She does exercise 5-6 days a week.  She knows what changes she can make in her diet.  Discussed health maintenance with pt and will schedule appropriate studies and/or immunizations.        Review of Systems    Objective:      Physical Exam  Vitals and nursing note reviewed.   Constitutional:       Appearance: She is well-developed.   HENT:      Head: Normocephalic.   Eyes:      Conjunctiva/sclera: Conjunctivae normal.      Pupils: Pupils are equal, round, and reactive to light.   Neck:      Thyroid: No thyromegaly.      Vascular: No carotid bruit.   Cardiovascular:      Rate and Rhythm: Normal rate and regular rhythm.      Heart sounds: Normal heart sounds.   Pulmonary:      Effort: Pulmonary effort is normal.      Breath sounds: Normal breath sounds.   Abdominal:      General: Bowel sounds are normal.      Palpations: Abdomen is soft.      Tenderness: There is no abdominal tenderness.   Musculoskeletal:         General: No tenderness or deformity. Normal range of motion.      Cervical back: Normal range of motion and neck supple.      Right lower leg: No edema.      Left lower leg: No edema.   Lymphadenopathy:      Cervical: No cervical adenopathy.   Skin:     General: Skin is warm and dry.   Neurological:      Mental Status: She is alert and oriented to person, place, and time.      Cranial Nerves: No cranial nerve deficit.      Motor: No abnormal muscle tone.      Coordination: Coordination normal.      Deep Tendon Reflexes: Reflexes normal.   Psychiatric:         Behavior: Behavior normal.         Assessment:       1. Wellness examination    2. Mixed hyperlipidemia    3. Essential hypertension        Plan:       Vera was seen today for annual  exam.    Diagnoses and all orders for this visit:    Wellness examination  -     CBC Auto Differential; Future  -     Comprehensive Metabolic Panel; Future  -     Hemoglobin A1C; Future  -     Lipid Panel; Future  -     TSH; Future    Mixed hyperlipidemia  -     rosuvastatin (CRESTOR) 20 MG tablet; Take 1 tablet (20 mg total) by mouth once daily.    Essential hypertension  -     losartan (COZAAR) 25 MG tablet; Take 1 tablet (25 mg total) by mouth once daily.      During this visit, I reviewed the pt's history, medications, allergies, and problem list.

## 2024-05-06 ENCOUNTER — PATIENT MESSAGE (OUTPATIENT)
Dept: FAMILY MEDICINE | Facility: CLINIC | Age: 49
End: 2024-05-06
Payer: COMMERCIAL

## 2024-07-11 ENCOUNTER — PATIENT MESSAGE (OUTPATIENT)
Dept: FAMILY MEDICINE | Facility: CLINIC | Age: 49
End: 2024-07-11

## 2024-07-11 ENCOUNTER — HOSPITAL ENCOUNTER (OUTPATIENT)
Dept: RADIOLOGY | Facility: HOSPITAL | Age: 49
Discharge: HOME | End: 2024-07-11
Attending: STUDENT IN AN ORGANIZED HEALTH CARE EDUCATION/TRAINING PROGRAM
Payer: COMMERCIAL

## 2024-07-11 ENCOUNTER — OFFICE VISIT (OUTPATIENT)
Dept: FAMILY MEDICINE | Facility: CLINIC | Age: 49
End: 2024-07-11
Payer: COMMERCIAL

## 2024-07-11 VITALS
HEART RATE: 99 BPM | WEIGHT: 171.06 LBS | OXYGEN SATURATION: 98 % | TEMPERATURE: 99 F | DIASTOLIC BLOOD PRESSURE: 82 MMHG | SYSTOLIC BLOOD PRESSURE: 170 MMHG | HEIGHT: 61 IN | BODY MASS INDEX: 32.3 KG/M2

## 2024-07-11 DIAGNOSIS — R06.2 WHEEZING: ICD-10-CM

## 2024-07-11 DIAGNOSIS — J40 BRONCHITIS: Primary | ICD-10-CM

## 2024-07-11 PROCEDURE — 3044F HG A1C LEVEL LT 7.0%: CPT | Mod: CPTII,S$GLB,, | Performed by: STUDENT IN AN ORGANIZED HEALTH CARE EDUCATION/TRAINING PROGRAM

## 2024-07-11 PROCEDURE — 99213 OFFICE O/P EST LOW 20 MIN: CPT | Mod: S$GLB,,, | Performed by: STUDENT IN AN ORGANIZED HEALTH CARE EDUCATION/TRAINING PROGRAM

## 2024-07-11 PROCEDURE — 71046 X-RAY EXAM CHEST 2 VIEWS: CPT | Mod: 26,,, | Performed by: RADIOLOGY

## 2024-07-11 PROCEDURE — 3008F BODY MASS INDEX DOCD: CPT | Mod: CPTII,S$GLB,, | Performed by: STUDENT IN AN ORGANIZED HEALTH CARE EDUCATION/TRAINING PROGRAM

## 2024-07-11 PROCEDURE — 71046 X-RAY EXAM CHEST 2 VIEWS: CPT | Mod: TC,FY,PO

## 2024-07-11 PROCEDURE — 3077F SYST BP >= 140 MM HG: CPT | Mod: CPTII,S$GLB,, | Performed by: STUDENT IN AN ORGANIZED HEALTH CARE EDUCATION/TRAINING PROGRAM

## 2024-07-11 PROCEDURE — 99999 PR PBB SHADOW E&M-EST. PATIENT-LVL III: CPT | Mod: PBBFAC,,, | Performed by: STUDENT IN AN ORGANIZED HEALTH CARE EDUCATION/TRAINING PROGRAM

## 2024-07-11 PROCEDURE — 4010F ACE/ARB THERAPY RXD/TAKEN: CPT | Mod: CPTII,S$GLB,, | Performed by: STUDENT IN AN ORGANIZED HEALTH CARE EDUCATION/TRAINING PROGRAM

## 2024-07-11 PROCEDURE — 3079F DIAST BP 80-89 MM HG: CPT | Mod: CPTII,S$GLB,, | Performed by: STUDENT IN AN ORGANIZED HEALTH CARE EDUCATION/TRAINING PROGRAM

## 2024-07-11 RX ORDER — PREDNISONE 20 MG/1
20 TABLET ORAL DAILY
Qty: 5 TABLET | Refills: 0 | Status: SHIPPED | OUTPATIENT
Start: 2024-07-11 | End: 2024-07-16

## 2024-07-11 RX ORDER — CLARITHROMYCIN 500 MG/1
500 TABLET, FILM COATED ORAL 2 TIMES DAILY
Qty: 10 TABLET | Refills: 0 | Status: SHIPPED | OUTPATIENT
Start: 2024-07-11 | End: 2024-07-16

## 2024-07-11 RX ORDER — AMOXICILLIN AND CLAVULANATE POTASSIUM 875; 125 MG/1; MG/1
1 TABLET, FILM COATED ORAL 2 TIMES DAILY
Qty: 10 TABLET | Refills: 0 | Status: SHIPPED | OUTPATIENT
Start: 2024-07-11 | End: 2024-07-16

## 2024-07-11 NOTE — PROGRESS NOTES
Name: Vera De Leon  MRN: [id number]  : 1975  PCP: LIZETH Tony MD    Subjective   Patient presents for follow up of pneumonia. She states she had an e-visit with Dr. Tony about four days ago (Epic was down during this visit; upon concluding the visit and reviewing ehr chart, I do not see any recent visits with Dr. Tony for pneumonia). She is on day 4 of Augmentin with no improvement in her symptoms.     Review of Systems   Constitutional:  Negative for fever.   HENT:  Negative for congestion and sore throat (groggy throat though).    Respiratory:  Positive for cough (productive of green sputum) and shortness of breath.    Cardiovascular:  Negative for chest pain.       Patient Active Problem List   Diagnosis    Asthma    Seasonal allergic rhinitis due to pollen       Health Maintenance Due   Topic Date Due    Hepatitis C Screening  Never done    TETANUS VACCINE  Never done    Colorectal Cancer Screening  Never done    COVID-19 Vaccine ( season) 2023       Objective   Vitals:    24 1157   BP: (!) 170/82   Pulse: 99   Temp: 99.2 °F (37.3 °C)       Physical Exam  Constitutional:       General: She is not in acute distress.     Appearance: Normal appearance. She is well-developed.   HENT:      Head: Normocephalic and atraumatic.      Right Ear: External ear normal.      Left Ear: External ear normal.   Eyes:      Conjunctiva/sclera: Conjunctivae normal.   Cardiovascular:      Rate and Rhythm: Normal rate and regular rhythm.      Heart sounds: No murmur heard.     No friction rub. No gallop.   Pulmonary:      Effort: Pulmonary effort is normal. No respiratory distress.      Breath sounds: Examination of the right-upper field reveals wheezing. Examination of the left-upper field reveals wheezing. Examination of the right-middle field reveals wheezing. Examination of the left-middle field reveals wheezing. Wheezing present. No rhonchi or rales.   Abdominal:      General: Abdomen is  flat. There is no distension.   Musculoskeletal:         General: No swelling or deformity.      Right lower leg: No edema.      Left lower leg: No edema.   Skin:     General: Skin is warm and dry.      Coloration: Skin is not jaundiced.   Neurological:      Mental Status: She is alert and oriented to person, place, and time. Mental status is at baseline.   Psychiatric:         Attention and Perception: Attention and perception normal.         Mood and Affect: Mood normal.         Speech: Speech normal.         Behavior: Behavior normal. Behavior is cooperative.         Thought Content: Thought content normal.         Cognition and Memory: Cognition normal.         Judgment: Judgment normal.         Assessment & Plan   1. Bronchitis  -     amoxicillin-clavulanate 875-125mg (AUGMENTIN) 875-125 mg per tablet; Take 1 tablet by mouth 2 (two) times daily. for 5 days  Dispense: 10 tablet; Refill: 0  -     clarithromycin (BIAXIN) 500 MG tablet; Take 1 tablet (500 mg total) by mouth 2 (two) times daily for 5 days  Dispense: 10 tablet; Refill: 0  -     predniSONE (DELTASONE) 20 MG tablet; Take 1 tablet (20 mg total) by mouth once daily for 5 days  Dispense: 5 tablet; Refill: 0    2. Wheezing  -     X-Ray Chest PA And Lateral; Future; Expected date: 07/11/2024    Xray reviewed - no lobar consolidation concerning for pneumonia.     Follow up as needed.     Poncho Worley MD  07/11/2024      No

## 2024-07-31 ENCOUNTER — OFFICE VISIT (OUTPATIENT)
Dept: FAMILY MEDICINE | Facility: CLINIC | Age: 49
End: 2024-07-31
Payer: COMMERCIAL

## 2024-07-31 VITALS
WEIGHT: 167.69 LBS | OXYGEN SATURATION: 99 % | HEART RATE: 93 BPM | DIASTOLIC BLOOD PRESSURE: 92 MMHG | SYSTOLIC BLOOD PRESSURE: 166 MMHG | BODY MASS INDEX: 31.66 KG/M2 | HEIGHT: 61 IN

## 2024-07-31 DIAGNOSIS — R09.82 POST-NASAL DRAINAGE: ICD-10-CM

## 2024-07-31 DIAGNOSIS — I10 ESSENTIAL HYPERTENSION: ICD-10-CM

## 2024-07-31 DIAGNOSIS — J45.20 MILD INTERMITTENT ASTHMA WITHOUT COMPLICATION: Primary | ICD-10-CM

## 2024-07-31 PROCEDURE — 99999 PR PBB SHADOW E&M-EST. PATIENT-LVL III: CPT | Mod: PBBFAC,,, | Performed by: FAMILY MEDICINE

## 2024-07-31 PROCEDURE — 1159F MED LIST DOCD IN RCRD: CPT | Mod: CPTII,S$GLB,, | Performed by: FAMILY MEDICINE

## 2024-07-31 PROCEDURE — 3044F HG A1C LEVEL LT 7.0%: CPT | Mod: CPTII,S$GLB,, | Performed by: FAMILY MEDICINE

## 2024-07-31 PROCEDURE — 4010F ACE/ARB THERAPY RXD/TAKEN: CPT | Mod: CPTII,S$GLB,, | Performed by: FAMILY MEDICINE

## 2024-07-31 PROCEDURE — 1160F RVW MEDS BY RX/DR IN RCRD: CPT | Mod: CPTII,S$GLB,, | Performed by: FAMILY MEDICINE

## 2024-07-31 PROCEDURE — 99213 OFFICE O/P EST LOW 20 MIN: CPT | Mod: S$GLB,,, | Performed by: FAMILY MEDICINE

## 2024-07-31 PROCEDURE — 3080F DIAST BP >= 90 MM HG: CPT | Mod: CPTII,S$GLB,, | Performed by: FAMILY MEDICINE

## 2024-07-31 PROCEDURE — 3008F BODY MASS INDEX DOCD: CPT | Mod: CPTII,S$GLB,, | Performed by: FAMILY MEDICINE

## 2024-07-31 PROCEDURE — 3077F SYST BP >= 140 MM HG: CPT | Mod: CPTII,S$GLB,, | Performed by: FAMILY MEDICINE

## 2024-07-31 RX ORDER — FLUTICASONE PROPIONATE AND SALMETEROL 100; 50 UG/1; UG/1
1 POWDER RESPIRATORY (INHALATION) 2 TIMES DAILY
Qty: 60 EACH | Refills: 2 | Status: SHIPPED | OUTPATIENT
Start: 2024-07-31 | End: 2025-07-31

## 2024-07-31 RX ORDER — FLUTICASONE PROPIONATE AND SALMETEROL 100; 50 UG/1; UG/1
1 POWDER RESPIRATORY (INHALATION) 2 TIMES DAILY
Qty: 60 EACH | Refills: 2 | Status: SHIPPED | OUTPATIENT
Start: 2024-07-31 | End: 2024-07-31 | Stop reason: SDUPTHER

## 2024-07-31 RX ORDER — IPRATROPIUM BROMIDE 42 UG/1
2 SPRAY, METERED NASAL 3 TIMES DAILY
Qty: 15 ML | Refills: 2 | Status: SHIPPED | OUTPATIENT
Start: 2024-07-31 | End: 2024-07-31 | Stop reason: SDUPTHER

## 2024-07-31 RX ORDER — LOSARTAN POTASSIUM 25 MG/1
50 TABLET ORAL DAILY
Start: 2024-07-31 | End: 2025-07-31

## 2024-07-31 RX ORDER — IPRATROPIUM BROMIDE 42 UG/1
2 SPRAY, METERED NASAL 3 TIMES DAILY
Qty: 15 ML | Refills: 2 | Status: SHIPPED | OUTPATIENT
Start: 2024-07-31

## 2024-07-31 NOTE — PROGRESS NOTES
Subjective:       Patient ID: Vera De Leon is a 49 y.o. female.    Chief Complaint: Nasal Congestion    Pt is known to me.  The pt has about a month long hx of cough and asthma symptoms.   She has cough, wheezing and chest tightness.   She uses her albuterol twice a day.  She uses the nebulizer at night.   She has been on 2 antibiotics and prednisone--she says that while on the prednisone she slowly felt better.  She is having post nasal drainage.   She is using Flonase and saline NS.   She has had no fever.   She had a neg chest xray.    Cough  This is a recurrent problem. The current episode started 1 to 4 weeks ago. The problem has been waxing and waning. The problem occurs every few minutes. The cough is Productive of sputum. Associated symptoms include shortness of breath and wheezing. Pertinent negatives include no chest pain, chills, ear congestion, ear pain, fever, headaches, heartburn, hemoptysis, myalgias, nasal congestion, postnasal drip, rash, rhinorrhea, sore throat, sweats or weight loss. She has tried a beta-agonist inhaler for the symptoms. The treatment provided mild relief. Her past medical history is significant for asthma.     Review of Systems   Constitutional:  Negative for chills, fever and weight loss.   HENT:  Negative for ear pain, postnasal drip, rhinorrhea and sore throat.    Respiratory:  Positive for cough, shortness of breath and wheezing. Negative for hemoptysis.    Cardiovascular:  Negative for chest pain.   Gastrointestinal:  Negative for heartburn.   Musculoskeletal:  Negative for myalgias.   Skin:  Negative for rash.   Neurological:  Negative for headaches.       Objective:      Physical Exam  Constitutional:       General: She is not in acute distress.     Appearance: Normal appearance. She is not ill-appearing.   HENT:      Nose: Nose normal.      Mouth/Throat:      Mouth: Mucous membranes are moist.      Pharynx: Oropharynx is clear.   Cardiovascular:      Rate and  Rhythm: Normal rate and regular rhythm.      Heart sounds: Normal heart sounds.   Pulmonary:      Effort: Pulmonary effort is normal.      Breath sounds: No wheezing or rhonchi.      Comments: Coarse BS;  wheezy cough  Skin:     General: Skin is warm and dry.   Neurological:      Mental Status: She is alert and oriented to person, place, and time.   Psychiatric:         Behavior: Behavior normal.         Assessment:       1. Mild intermittent asthma without complication    2. Post-nasal drainage    3. Essential hypertension        Plan:       Vera was seen today for nasal congestion.    Diagnoses and all orders for this visit:    Mild intermittent asthma without complication  Comments:  Use albuterol as needed.  Orders:  -     Discontinue: fluticasone-salmeterol diskus inhaler 100-50 mcg; Inhale 1 puff into the lungs 2 (two) times daily. Controller  -     fluticasone-salmeterol diskus inhaler 100-50 mcg; Inhale 1 puff into the lungs 2 (two) times daily. Controller    Post-nasal drainage  -     Discontinue: ipratropium (ATROVENT) 42 mcg (0.06 %) nasal spray; 2 sprays by Each Nostril route 3 (three) times daily.  -     ipratropium (ATROVENT) 42 mcg (0.06 %) nasal spray; 2 sprays by Each Nostril route 3 (three) times daily.    Essential hypertension  -     losartan (COZAAR) 25 MG tablet; Take 2 tablets (50 mg total) by mouth once daily.      During this visit, I reviewed the pt's history, medications, allergies, and problem list.

## 2024-08-06 ENCOUNTER — OFFICE VISIT (OUTPATIENT)
Dept: FAMILY MEDICINE | Facility: CLINIC | Age: 49
End: 2024-08-06
Payer: COMMERCIAL

## 2024-08-06 ENCOUNTER — LAB VISIT (OUTPATIENT)
Dept: LAB | Facility: HOSPITAL | Age: 49
End: 2024-08-06
Attending: FAMILY MEDICINE
Payer: COMMERCIAL

## 2024-08-06 VITALS
DIASTOLIC BLOOD PRESSURE: 88 MMHG | HEIGHT: 61 IN | OXYGEN SATURATION: 99 % | SYSTOLIC BLOOD PRESSURE: 152 MMHG | HEART RATE: 75 BPM | BODY MASS INDEX: 32.27 KG/M2 | WEIGHT: 170.94 LBS

## 2024-08-06 DIAGNOSIS — E87.5 HYPERKALEMIA: ICD-10-CM

## 2024-08-06 DIAGNOSIS — J45.20 MILD INTERMITTENT ASTHMA WITHOUT COMPLICATION: ICD-10-CM

## 2024-08-06 DIAGNOSIS — I10 PRIMARY HYPERTENSION: Primary | ICD-10-CM

## 2024-08-06 DIAGNOSIS — T75.3XXA MOTION SICKNESS, INITIAL ENCOUNTER: ICD-10-CM

## 2024-08-06 LAB — POTASSIUM SERPL-SCNC: 4.2 MMOL/L (ref 3.5–5.1)

## 2024-08-06 PROCEDURE — 1160F RVW MEDS BY RX/DR IN RCRD: CPT | Mod: CPTII,S$GLB,, | Performed by: FAMILY MEDICINE

## 2024-08-06 PROCEDURE — 3044F HG A1C LEVEL LT 7.0%: CPT | Mod: CPTII,S$GLB,, | Performed by: FAMILY MEDICINE

## 2024-08-06 PROCEDURE — 3079F DIAST BP 80-89 MM HG: CPT | Mod: CPTII,S$GLB,, | Performed by: FAMILY MEDICINE

## 2024-08-06 PROCEDURE — 99214 OFFICE O/P EST MOD 30 MIN: CPT | Mod: S$GLB,,, | Performed by: FAMILY MEDICINE

## 2024-08-06 PROCEDURE — 3077F SYST BP >= 140 MM HG: CPT | Mod: CPTII,S$GLB,, | Performed by: FAMILY MEDICINE

## 2024-08-06 PROCEDURE — 36415 COLL VENOUS BLD VENIPUNCTURE: CPT | Mod: PO | Performed by: FAMILY MEDICINE

## 2024-08-06 PROCEDURE — 1159F MED LIST DOCD IN RCRD: CPT | Mod: CPTII,S$GLB,, | Performed by: FAMILY MEDICINE

## 2024-08-06 PROCEDURE — 4010F ACE/ARB THERAPY RXD/TAKEN: CPT | Mod: CPTII,S$GLB,, | Performed by: FAMILY MEDICINE

## 2024-08-06 PROCEDURE — 84132 ASSAY OF SERUM POTASSIUM: CPT | Mod: PO | Performed by: FAMILY MEDICINE

## 2024-08-06 PROCEDURE — 99999 PR PBB SHADOW E&M-EST. PATIENT-LVL IV: CPT | Mod: PBBFAC,,, | Performed by: FAMILY MEDICINE

## 2024-08-06 PROCEDURE — 3008F BODY MASS INDEX DOCD: CPT | Mod: CPTII,S$GLB,, | Performed by: FAMILY MEDICINE

## 2024-08-06 RX ORDER — ONDANSETRON 8 MG/1
8 TABLET, ORALLY DISINTEGRATING ORAL 2 TIMES DAILY
Qty: 30 TABLET | Refills: 0 | Status: SHIPPED | OUTPATIENT
Start: 2024-08-06

## 2024-08-27 DIAGNOSIS — J45.20 MILD INTERMITTENT ASTHMA WITHOUT COMPLICATION: ICD-10-CM

## 2024-08-28 DIAGNOSIS — I10 ESSENTIAL HYPERTENSION: ICD-10-CM

## 2024-08-28 NOTE — TELEPHONE ENCOUNTER
No care due was identified.  [facility] Embedded Care Due Messages. Reference number: [id number].   8/28/2024 8:20:21 AM CDT

## 2024-08-28 NOTE — TELEPHONE ENCOUNTER
No care due was identified.  [facility] Embedded Care Due Messages. Reference number: [id number].   8/27/2024 8:23:33 PM CDT

## 2024-08-30 RX ORDER — ALBUTEROL SULFATE 90 UG/1
2 INHALANT RESPIRATORY (INHALATION) EVERY 6 HOURS PRN
Qty: 54 G | Refills: 3 | Status: SHIPPED | OUTPATIENT
Start: 2024-08-30

## 2024-08-30 RX ORDER — LOSARTAN POTASSIUM 25 MG/1
50 TABLET ORAL DAILY
Qty: 180 TABLET | Refills: 3 | Status: SHIPPED | OUTPATIENT
Start: 2024-08-30 | End: 2025-08-30

## 2024-11-13 ENCOUNTER — OFFICE VISIT (OUTPATIENT)
Dept: ORTHOPEDICS | Facility: CLINIC | Age: 49
End: 2024-11-13
Payer: COMMERCIAL

## 2024-11-13 DIAGNOSIS — G56.03 CARPAL TUNNEL SYNDROME, BILATERAL: Primary | ICD-10-CM

## 2024-11-13 PROCEDURE — 1159F MED LIST DOCD IN RCRD: CPT | Mod: CPTII,S$GLB,, | Performed by: ORTHOPAEDIC SURGERY

## 2024-11-13 PROCEDURE — 3044F HG A1C LEVEL LT 7.0%: CPT | Mod: CPTII,S$GLB,, | Performed by: ORTHOPAEDIC SURGERY

## 2024-11-13 PROCEDURE — 99204 OFFICE O/P NEW MOD 45 MIN: CPT | Mod: S$GLB,,, | Performed by: ORTHOPAEDIC SURGERY

## 2024-11-13 PROCEDURE — 4010F ACE/ARB THERAPY RXD/TAKEN: CPT | Mod: CPTII,S$GLB,, | Performed by: ORTHOPAEDIC SURGERY

## 2024-11-13 PROCEDURE — 99999 PR PBB SHADOW E&M-EST. PATIENT-LVL III: CPT | Mod: PBBFAC,,, | Performed by: ORTHOPAEDIC SURGERY

## 2024-11-13 RX ORDER — METHYLPREDNISOLONE 4 MG/1
TABLET ORAL
Qty: 21 EACH | Refills: 0 | Status: SHIPPED | OUTPATIENT
Start: 2024-11-13 | End: 2024-12-04

## 2024-11-13 RX ORDER — METHYLPREDNISOLONE 4 MG/1
TABLET ORAL
Qty: 21 EACH | Refills: 0 | Status: SHIPPED | OUTPATIENT
Start: 2024-11-13 | End: 2024-11-13

## 2024-11-13 NOTE — PROGRESS NOTES
2024    Chief Complaint:  Chief Complaint   Patient presents with    Left Hand - Pain, Numbness     Trigger finger middle finger    Right Hand - Numbness, Pain     Trigger finger middle finger       HPI:  Vera De Leon is a 49 y.o. female, who presents to clinic today he was also had trigger fingers.  She states that currently her middle fingers on both her right and left hands are affected.  She has tried some intermittent ibuprofen without relief.  She was also tried wearing a sleeve on her wrist when she sleeps which he feels made her symptoms worse.  She states that the carpal tunnel syndrome is not new it has been there for years.  She states that the triggering is relatively new    PMHX:  Past Medical History:   Diagnosis Date    Allergy     Asthma     Chronic kidney disease     kidney stones       PSHX:  Past Surgical History:   Procedure Laterality Date    Breast reduction Bilateral      SECTION  1999; 3/2005    TOTAL REDUCTION MAMMOPLASTY          TUBAL LIGATION  3/2005       FMHX:  Family History   Problem Relation Name Age of Onset    Hypertension Mother      Alzheimer's disease Paternal Grandmother      Breast cancer Neg Hx      Colon cancer Neg Hx      Ovarian cancer Neg Hx      Diabetes Neg Hx         SOCHX:  Social History     Tobacco Use    Smoking status: Never    Smokeless tobacco: Never   Substance Use Topics    Alcohol use: No       ALLERGIES:  Latex, Propofol, and Toradol  [ketorolac]    CURRENT MEDICATIONS:  Current Outpatient Medications on File Prior to Visit   Medication Sig Dispense Refill    albuterol (PROAIR HFA) 90 mcg/actuation inhaler Inhale 2 puffs into the lungs every 6 (six) hours as needed for Wheezing. Rescue 54 g 3    fluticasone-salmeterol diskus inhaler 100-50 mcg Inhale 1 puff into the lungs 2 (two) times daily. Controller 60 each 2    ipratropium (ATROVENT) 42 mcg (0.06 %) nasal spray Use 2 sprays by Each Nostril route 3 (three) times daily. 15  mL 2    losartan (COZAAR) 25 MG tablet Take 2 tablets (50 mg total) by mouth once daily. 180 tablet 3    norethindrone (AYGESTIN) 5 mg Tab TAKE ONE TABLET BY MOUTH DAILY FOR 21 DAYS, STOP FOR 7 DAYS, THEN START TAKING AGAIN FOR 21 DAYS 63 tablet 3    ondansetron (ZOFRAN-ODT) 8 MG TbDL Take 1 tablet (8 mg total) by mouth 2 (two) times daily. 30 tablet 0    promethazine-dextromethorphan (PROMETHAZINE-DM) 6.25-15 mg/5 mL Syrp Take 5 mLs by mouth every 6 (six) hours as needed (cough). 120 mL 0    rosuvastatin (CRESTOR) 20 MG tablet Take 1 tablet (20 mg total) by mouth once daily. 90 tablet 3    fluticasone propionate (FLONASE) 50 mcg/actuation nasal spray 1 spray by Each Nostril route once daily.       No current facility-administered medications on file prior to visit.       REVIEW OF SYSTEMS:  Review of Systems   Constitutional: Negative.    HENT: Negative.     Eyes: Negative.    Respiratory: Negative.     Cardiovascular: Negative.    Gastrointestinal: Negative.    Genitourinary: Negative.    Musculoskeletal:  Positive for joint pain.   Skin: Negative.    Neurological:  Positive for weakness.   Endo/Heme/Allergies: Negative.    Psychiatric/Behavioral: Negative.       GENERAL PHYSICAL EXAM:   There were no vitals taken for this visit.   GEN: well developed, well nourished, no acute distress   HENT: Normocephalic, atraumatic   EYES: No discharge, conjunctiva normal   NECK: Supple, non-tender   PULM: No wheezing, no respiratory distress   CV: RRR   ABD: Soft, non-tender    ORTHO EXAM:   Examination of bilateral hands and wrist reveals that there are no major skin changes.  There was no significant edema.  Palpation does produce mild tenderness over the A1 pulleys of both the right and left middle fingers.  Flexion and extension of the fingers is mildly type but there was no significant triggering noted.  She has decreased sensation in the median distributions bilaterally.  On the right she has a positive Tinel's on the  left she has negative Tinel's.  She has bilateral positive Durkan's test.  She has 2+ radial pulses.    RADIOLOGY:   None    ASSESSMENT:   Bilateral carpal tunnel syndrome, bilateral middle finger triggering    PLAN:  1. I will start her on a Medrol Dosepak.  She did discuss the fact that she does have some increased heart where it with steroids and therefore we will monitor it with a Dosepak.  If she has any significant increase in her heart rate and she can decrease the dosing but she will call our office.      2. Will set her up for an EMG and nerve conduction study     3.  She can wear splints at night to sleep     4.  She will follow up as soon as the nerve conduction test is completed to discuss further treatment for the carpal tunnel

## 2024-12-30 ENCOUNTER — LAB VISIT (OUTPATIENT)
Dept: LAB | Facility: HOSPITAL | Age: 49
End: 2024-12-30
Attending: OBSTETRICS & GYNECOLOGY
Payer: COMMERCIAL

## 2024-12-30 ENCOUNTER — OFFICE VISIT (OUTPATIENT)
Dept: OBSTETRICS AND GYNECOLOGY | Facility: CLINIC | Age: 49
End: 2024-12-30
Payer: COMMERCIAL

## 2024-12-30 VITALS
DIASTOLIC BLOOD PRESSURE: 90 MMHG | HEIGHT: 61 IN | WEIGHT: 183 LBS | SYSTOLIC BLOOD PRESSURE: 165 MMHG | BODY MASS INDEX: 34.55 KG/M2

## 2024-12-30 DIAGNOSIS — N95.1 PERIMENOPAUSE: ICD-10-CM

## 2024-12-30 DIAGNOSIS — N95.2 VAGINAL ATROPHY: ICD-10-CM

## 2024-12-30 DIAGNOSIS — N95.1 PERIMENOPAUSE: Primary | ICD-10-CM

## 2024-12-30 LAB
ESTRADIOL SERPL-MCNC: 36 PG/ML
FSH SERPL-ACNC: 5.56 MIU/ML
TESTOST SERPL-MCNC: 44 NG/DL (ref 5–73)

## 2024-12-30 PROCEDURE — 84402 ASSAY OF FREE TESTOSTERONE: CPT | Performed by: OBSTETRICS & GYNECOLOGY

## 2024-12-30 PROCEDURE — 3080F DIAST BP >= 90 MM HG: CPT | Mod: CPTII,S$GLB,, | Performed by: OBSTETRICS & GYNECOLOGY

## 2024-12-30 PROCEDURE — 83001 ASSAY OF GONADOTROPIN (FSH): CPT | Performed by: OBSTETRICS & GYNECOLOGY

## 2024-12-30 PROCEDURE — 99214 OFFICE O/P EST MOD 30 MIN: CPT | Mod: S$GLB,,, | Performed by: OBSTETRICS & GYNECOLOGY

## 2024-12-30 PROCEDURE — 1159F MED LIST DOCD IN RCRD: CPT | Mod: CPTII,S$GLB,, | Performed by: OBSTETRICS & GYNECOLOGY

## 2024-12-30 PROCEDURE — 84403 ASSAY OF TOTAL TESTOSTERONE: CPT | Performed by: OBSTETRICS & GYNECOLOGY

## 2024-12-30 PROCEDURE — 99999 PR PBB SHADOW E&M-EST. PATIENT-LVL III: CPT | Mod: PBBFAC,,, | Performed by: OBSTETRICS & GYNECOLOGY

## 2024-12-30 PROCEDURE — 36415 COLL VENOUS BLD VENIPUNCTURE: CPT | Performed by: OBSTETRICS & GYNECOLOGY

## 2024-12-30 PROCEDURE — 3008F BODY MASS INDEX DOCD: CPT | Mod: CPTII,S$GLB,, | Performed by: OBSTETRICS & GYNECOLOGY

## 2024-12-30 PROCEDURE — 1160F RVW MEDS BY RX/DR IN RCRD: CPT | Mod: CPTII,S$GLB,, | Performed by: OBSTETRICS & GYNECOLOGY

## 2024-12-30 PROCEDURE — 4010F ACE/ARB THERAPY RXD/TAKEN: CPT | Mod: CPTII,S$GLB,, | Performed by: OBSTETRICS & GYNECOLOGY

## 2024-12-30 PROCEDURE — 3077F SYST BP >= 140 MM HG: CPT | Mod: CPTII,S$GLB,, | Performed by: OBSTETRICS & GYNECOLOGY

## 2024-12-30 PROCEDURE — 82670 ASSAY OF TOTAL ESTRADIOL: CPT | Performed by: OBSTETRICS & GYNECOLOGY

## 2024-12-30 PROCEDURE — 3044F HG A1C LEVEL LT 7.0%: CPT | Mod: CPTII,S$GLB,, | Performed by: OBSTETRICS & GYNECOLOGY

## 2024-12-30 RX ORDER — ESTRADIOL 10 UG/1
10 INSERT VAGINAL
Qty: 8 EACH | Refills: 11 | Status: SHIPPED | OUTPATIENT
Start: 2024-12-30

## 2024-12-30 RX ORDER — ESTRADIOL 10 UG/1
10 INSERT VAGINAL DAILY
Qty: 18 EACH | Refills: 0 | Status: SHIPPED | OUTPATIENT
Start: 2024-12-30

## 2024-12-30 NOTE — PROGRESS NOTES
Vera De Leon is a 49 y.o. female who presents to discuss hormone replacement therapy.  Menarche occurred at age 11 and the patient is not yet menopausal .  She has been on Norethindrone to control cycles for the past 2 years, had hot flashes December and January and then again has had hot flashes the week off of the Norethindrone when she expected to have a cycle. . She also reports weight gain more recently in spite of exercising regularly, walks 4 miles daily and goes to the gym . Feels like she eats a healthy diet.  She had a withdrawal bleed previously, and much  lighter but has not had a bleed since .  She reports hot flashes the week off the norethindrone for the past several months, but no withdrawal bleeding. She reports insomnia due to the night sweats /hot flashes. .The patient is not taking hormone replacement therapy. The patient reports cyclic symptoms including: anxiety, bloating, changes in libido, and weight gain and vaginal dryness with dyspareunia.. The patient is sexually active.  She denies the following contraindications to HRT:  Vaginal bleeding, history of VTE/PE, thrombophilia,  breast cancer, or active liver disease.       PCP: Dr. Pamela Tony       Routine labs: NA  Pap smear: 3/25/2024  Mammogram: 3-  DEXA: NA  Colonoscopy: NA    Lab Visit on 2024   Component Date Value Ref Range Status    Follicle Stimulating Hormone 2024 5.56  See Text mIU/mL Final    Estradiol 2024 36  See Text pg/mL Final    Testosterone, Total 2024 44  5 - 73 ng/dL Final       Past Medical History:   Diagnosis Date    Allergy     Asthma     Chronic kidney disease     kidney stones    Hyperlipidemia     Hypertension      Past Surgical History:   Procedure Laterality Date    Breast reduction Bilateral      SECTION  1999; 3/2005    TOTAL REDUCTION MAMMOPLASTY          TUBAL LIGATION  3/2005     Social History     Tobacco Use    Smoking status: Never     Smokeless tobacco: Never   Substance Use Topics    Alcohol use: No    Drug use: No     Comment: Tubal ligation     Family History   Problem Relation Name Age of Onset    Stroke Paternal Grandfather      Alzheimer's disease Paternal Grandmother      Hypertension Mother      Breast cancer Neg Hx      Colon cancer Neg Hx      Ovarian cancer Neg Hx      Diabetes Neg Hx       OB History    Para Term  AB Living   2 2 2     2   SAB IAB Ectopic Multiple Live Births           2      # Outcome Date GA Lbr Robert/2nd Weight Sex Type Anes PTL Lv   2 Term 05 40w0d  3.374 kg (7 lb 7 oz) F CS-LTranv EPI N CARI   1 Term 99 41w0d  4.082 kg (9 lb) F CS-LTranv EPI N CARI       Current Outpatient Medications:     albuterol (PROAIR HFA) 90 mcg/actuation inhaler, Inhale 2 puffs into the lungs every 6 (six) hours as needed for Wheezing. Rescue, Disp: 54 g, Rfl: 3    fluticasone-salmeterol diskus inhaler 100-50 mcg, Inhale 1 puff into the lungs 2 (two) times daily. Controller, Disp: 60 each, Rfl: 2    ipratropium (ATROVENT) 42 mcg (0.06 %) nasal spray, Use 2 sprays by Each Nostril route 3 (three) times daily., Disp: 15 mL, Rfl: 2    losartan (COZAAR) 25 MG tablet, Take 2 tablets (50 mg total) by mouth once daily., Disp: 180 tablet, Rfl: 3    norethindrone (AYGESTIN) 5 mg Tab, TAKE ONE TABLET BY MOUTH DAILY FOR 21 DAYS, STOP FOR 7 DAYS, THEN START TAKING AGAIN FOR 21 DAYS, Disp: 63 tablet, Rfl: 3    ondansetron (ZOFRAN-ODT) 8 MG TbDL, Take 1 tablet (8 mg total) by mouth 2 (two) times daily., Disp: 30 tablet, Rfl: 0    promethazine-dextromethorphan (PROMETHAZINE-DM) 6.25-15 mg/5 mL Syrp, Take 5 mLs by mouth every 6 (six) hours as needed (cough)., Disp: 120 mL, Rfl: 0    rosuvastatin (CRESTOR) 20 MG tablet, Take 1 tablet (20 mg total) by mouth once daily., Disp: 90 tablet, Rfl: 3    estradioL (IMVEXXY MAINTENANCE PACK) 10 mcg Inst, Place 10 mcg vaginally twice a week., Disp: 8 each, Rfl: 11    estradioL (IMVEXXY STARTER  "PACK) 10 mcg InPk, Place 10 mcg vaginally once daily. Place daily x 2 weeks then twice weekly, Disp: 18 each, Rfl: 0    fluticasone propionate (FLONASE) 50 mcg/actuation nasal spray, 1 spray by Each Nostril route once daily., Disp: , Rfl:     Review of Systems:  General: No fever, chills, or weight loss.  Chest: No chest pain, shortness of breath, or palpitations.  Breast: No pain, masses, or nipple discharge.  Vulva: No pain, lesions, or itching.  Vagina: No relaxation, itching, discharge, or lesions.  Abdomen: No pain, nausea, vomiting, diarrhea, or constipation.  Urinary: No incontinence, nocturia, frequency, or dysuria.  Extremities:  No leg cramps, edema, or calf pain.  Neurologic: No headaches, dizziness, or visual changes.    Vitals:    12/30/24 1341   BP: (!) 165/90   Weight: 83 kg (182 lb 15.7 oz)   Height: 5' 1" (1.549 m)   PainSc: 0-No pain     Body mass index is 34.57 kg/m².    Assessment:    Perimenopause  -     Follicle Stimulating Hormone; Future; Expected date: 12/30/2024  -     Estradiol; Future; Expected date: 12/30/2024  -     Testosterone, Free; Future; Expected date: 12/30/2024  -     Testosterone; Future; Expected date: 12/30/2024    Vaginal atrophy  -     estradioL (IMVEXXY STARTER PACK) 10 mcg InPk; Place 10 mcg vaginally once daily. Place daily x 2 weeks then twice weekly  Dispense: 18 each; Refill: 0  -     estradioL (IMVEXXY MAINTENANCE PACK) 10 mcg Inst; Place 10 mcg vaginally twice a week.  Dispense: 8 each; Refill: 11        Plan:   Risks and benefits of hormone replacement therapy were discussed.  Hormone replacement therapy options, including bioidentical versus non-bioidentical hormones, as well as alternatives discussed.    We spent a significant amt of time discussing estrogen replacement theropy.   We discussed Perimenopause vs Menopause, as well as treatment options of various symptoms. We discussed the risks and benefits including breast cancer and embolic risk, osteoporosis and " heart and colon health benefits.  Pt understands that there are many different ways to take estrogen and many different doses and that she does not have to take long term unless she chooses.  She understands that if she still has her uterus, she will need to take progesterone with this to decrease risk of endometrial cancer.We discussed side effects that might include but not limited to headaches, edema, nausea.   We did discuss that transdermal option of estrogen is safer than oral estradiol as transdermal methods decrease risk for blood clots and stroke as they bypass liver metabolism.     The patient and I have discussed testosterone therapy and its risks and benefits.  The risks include increased increasing cholesterol levels, facial hair and other hair growth, acne, increased sized of clitoris, deepening of voice, anxiety as well as other side effects.  Benefits include increased energy level, increased libido, easier orgasm and potiential increased muscle mass.  Pt understands that different women have different amounts of risks and benefits to this med and that she can stop the medication at any time. (Will treat dyspareunia with vaginal estradiol  first , then consider topical Testosterone 1% gel if continues to have low libido)     Will send for Nutritional counseling for weight loss.    Follow up in 3 months  Will recheck labs once on typical optimal dose or if having side effects.  Instructed patient to call if she experiences any side effects or has any questions.  I spent 30 minutes with this patient today, >50% counseling.

## 2024-12-31 ENCOUNTER — PATIENT OUTREACH (OUTPATIENT)
Dept: ADMINISTRATIVE | Facility: HOSPITAL | Age: 49
End: 2024-12-31
Payer: COMMERCIAL

## 2024-12-31 VITALS — SYSTOLIC BLOOD PRESSURE: 128 MMHG | DIASTOLIC BLOOD PRESSURE: 74 MMHG

## 2024-12-31 NOTE — PROGRESS NOTES
Captured remote bp  Pt contemplating switching PCP and will address colon cancer screening with new PCP

## 2025-01-03 ENCOUNTER — OFFICE VISIT (OUTPATIENT)
Dept: PHYSICAL MEDICINE AND REHAB | Facility: CLINIC | Age: 50
End: 2025-01-03
Payer: COMMERCIAL

## 2025-01-03 DIAGNOSIS — G56.03 CARPAL TUNNEL SYNDROME, BILATERAL: ICD-10-CM

## 2025-01-03 PROCEDURE — 99999 PR PBB SHADOW E&M-EST. PATIENT-LVL II: CPT | Mod: PBBFAC,,, | Performed by: PHYSICAL MEDICINE & REHABILITATION

## 2025-01-03 NOTE — PROGRESS NOTES
[facility]  [address] [place] [address]             Full Name: Vera De Leon Gender: Female  MRN: [id number] YOB: 1975  History: Patient complaining of numbness, tingling, pain and stiffness of bilateral upper hands.  No neck pain.          Visit Date: 1/3/2025 8:33 AM  Age: 49 Years  Examining Physician: Diane James DO   Referring Physician: Jorge Grajeda MD   Height: 5 feet 1 inch      Sensory NCS      Nerve / Sites Rec. Site Onset Lat Peak Lat NP Amp PP Amp Segments Distance Velocity     ms ms µV µV  cm m/s   L Median - Digit III (Antidromic)      Wrist Dig III 5.81 7.96 7.3  Wrist - Dig III 14 24   R Median - Digit III (Antidromic)      Wrist Dig III NR NR NR NR Wrist - Dig III 14 NR   L Ulnar - Digit V (Antidromic)      Wrist Dig V 2.44 3.33 26.7 30.1 Wrist - Dig V 14 57   R Ulnar - Digit V (Antidromic)      Wrist Dig V 2.38 3.10 36.5 50.6 Wrist - Dig V 14 59   L Radial - Anatomical snuff box (Forearm)      Forearm Wrist 1.31 2.00 49.4 30.5 Forearm - Wrist 10 76   R Radial - Anatomical snuff box (Forearm)      Forearm Wrist 1.35 2.02 48.9 23.8 Forearm - Wrist 10 74       Motor NCS      Nerve / Sites Muscle Latency Amplitude Amp % Duration Segments Distance Lat Diff Velocity     ms mV % ms  cm ms m/s   L Median - APB      Wrist APB 8.31 6.0 100 8.65 Wrist - APB 8        Elbow APB 12.02 5.7 96.1 8.75 Elbow - Wrist 19.5 3.71 53   R Median - APB      Wrist APB 11.17 7.2 100 7.77 Wrist - APB 8        Elbow APB 15.23 6.7 92.3 7.71 Elbow - Wrist 19 4.06 47   L Ulnar - ADM      Wrist ADM 3.02 11.7 100 7.50 Wrist - ADM 8        B.Elbow ADM 5.94 11.3 96.3 7.96 B.Elbow - Wrist 18 2.92 62      A.Elbow ADM 8.08 10.4 88.5 8.40 A.Elbow - B.Elbow 11 2.15 51   R Ulnar - ADM      Wrist ADM 3.10 13.4 100 6.04 Wrist - ADM 8        B.Elbow ADM 6.00 12.9 96.3 6.35 B.Elbow - Wrist 16.5 2.90 57      A.Elbow ADM 8.06 11.9 89.1 6.54 A.Elbow - B.Elbow 11 2.06 53       EMG Summary Table      Spontaneous MUAP Recruitment   Muscle IA Fib PSW Fasc CRD Amp Dur. Poly Pattern   L. Deltoid N None None None None N N None N   L. Biceps brachii N None None None None N N None N   L. Triceps brachii N None None None None N N None N   L. Pronator teres N None None None None N N None N   L. Abductor pollicis brevis N None None None None N N None N   R. Deltoid N None None None None N N None N   R. Biceps brachii N None None None None N N None N   R. Triceps brachii N None None None None N N None N   R. Pronator teres N None None None None N N None N   R. Abductor pollicis brevis N None None None None N N None N       Summary    The motor conduction test was performed on 4 nerve(s). The results were normal in 2 nerve(s): L Ulnar - ADM, R Ulnar - ADM. Results outside the specified normal range were found in 2 nerve(s), as follows:  In the L Median - APB study  the take off latency result was increased for Wrist stimulation  In the R Median - APB study  the take off latency result was increased for Wrist stimulation  the take off velocity result was reduced for Elbow - Wrist segment    The sensory conduction test was performed on 6 nerve(s). The results were normal in 4 nerve(s): L Ulnar - Digit V (Antidromic), R Ulnar - Digit V (Antidromic), L Radial - Anatomical snuff box (Forearm), R Radial - Anatomical snuff box (Forearm). Results outside the specified normal range were found in 2 nerve(s), as follows:  In the L Median - Digit III (Antidromic) study  the peak latency result was increased for Wrist stimulation  the peak amplitude result was reduced for Wrist stimulation  In the R Median - Digit III (Antidromic) study  the response was considered absent for Wrist stimulation    The needle EMG study was normal in all 10 tested muscles: L. Deltoid, L. Biceps brachii, L. Triceps brachii, L. Pronator teres, L. Abductor pollicis brevis, R. Deltoid, R. Biceps brachii, R. Triceps brachii, R. Pronator teres, R. Abductor  pollicis brevis.       Impression:  Abnormal study.    Moderate/severe right carpal tunnel syndrome, without active denervation.    Moderate left carpal tunnel syndrome, without active denervation.    No electrophysiologic evidence of bilateral cervical radiculopathy/plexopathy, bilateral ulnar mononeuropathy, or peripheral neuropathy in the upper extremities.    ------------------------------  Diane James, DO

## 2025-01-06 ENCOUNTER — OFFICE VISIT (OUTPATIENT)
Dept: ORTHOPEDICS | Facility: CLINIC | Age: 50
End: 2025-01-06
Payer: COMMERCIAL

## 2025-01-06 DIAGNOSIS — G56.03 CARPAL TUNNEL SYNDROME, BILATERAL: Primary | ICD-10-CM

## 2025-01-06 DIAGNOSIS — M65.332 TRIGGER FINGER, LEFT MIDDLE FINGER: ICD-10-CM

## 2025-01-06 PROCEDURE — 99214 OFFICE O/P EST MOD 30 MIN: CPT | Mod: S$GLB,,, | Performed by: ORTHOPAEDIC SURGERY

## 2025-01-06 PROCEDURE — 99999 PR PBB SHADOW E&M-EST. PATIENT-LVL III: CPT | Mod: PBBFAC,,, | Performed by: ORTHOPAEDIC SURGERY

## 2025-01-06 PROCEDURE — 1159F MED LIST DOCD IN RCRD: CPT | Mod: CPTII,S$GLB,, | Performed by: ORTHOPAEDIC SURGERY

## 2025-01-06 NOTE — PATIENT INSTRUCTIONS
Surgery Instructions:     Your surgery is scheduled on 1/28/2025 at the surgery center: [address], 1st floor, second entrance.    The pre-op department will be in contact with you prior to your procedure to review medications and instructions.       Nothing to eat or drink after midnight prior to day of surgery.    You should STOP taking any blood thinners 5 days prior to surgery.     The surgery center will contact you the day prior to surgery to advise you of your arrival time for surgery.     Your post op appointment is scheduled on 2/12 at 10:20.

## 2025-01-06 NOTE — H&P (VIEW-ONLY)
2025    Chief Complaint:  Chief Complaint   Patient presents with    Right Wrist - Numbness, Pain     EMG results    Left Wrist - Numbness, Pain     EMG results       HPI:  Vera De Leon is a 49 y.o. female, who presents to clinic today has a history of bilateral carpal tunnel syndrome.  She also had some triggering fingers.  The most significant of which is the left middle finger.  She is here today to discuss treatment options.  She has had a nerve conduction study.    PMHX:  Past Medical History:   Diagnosis Date    Allergy     Asthma     Chronic kidney disease     kidney stones    Hyperlipidemia     Hypertension        PSHX:  Past Surgical History:   Procedure Laterality Date    Breast reduction Bilateral 2002     SECTION  1999; 3/2005    TOTAL REDUCTION MAMMOPLASTY          TUBAL LIGATION  3/2005       FMHX:  Family History   Problem Relation Name Age of Onset    Stroke Paternal Grandfather      Alzheimer's disease Paternal Grandmother      Hypertension Mother      Breast cancer Neg Hx      Colon cancer Neg Hx      Ovarian cancer Neg Hx      Diabetes Neg Hx         SOCHX:  Social History     Tobacco Use    Smoking status: Never    Smokeless tobacco: Never   Substance Use Topics    Alcohol use: No       ALLERGIES:  Latex, Propofol, and Toradol  [ketorolac]    CURRENT MEDICATIONS:  Current Outpatient Medications on File Prior to Visit   Medication Sig Dispense Refill    albuterol (PROAIR HFA) 90 mcg/actuation inhaler Inhale 2 puffs into the lungs every 6 (six) hours as needed for Wheezing. Rescue 54 g 3    estradioL (IMVEXXY MAINTENANCE PACK) 10 mcg Inst Place 10 mcg vaginally twice a week. 8 each 11    estradioL (IMVEXXY STARTER PACK) 10 mcg InPk Place 10 mcg vaginally once daily. Place daily x 2 weeks then twice weekly 18 each 0    fluticasone-salmeterol diskus inhaler 100-50 mcg Inhale 1 puff into the lungs 2 (two) times daily. Controller 60 each 2    ipratropium (ATROVENT) 42 mcg (0.06  %) nasal spray Use 2 sprays by Each Nostril route 3 (three) times daily. 15 mL 2    losartan (COZAAR) 25 MG tablet Take 2 tablets (50 mg total) by mouth once daily. 180 tablet 3    norethindrone (AYGESTIN) 5 mg Tab TAKE ONE TABLET BY MOUTH DAILY FOR 21 DAYS, STOP FOR 7 DAYS, THEN START TAKING AGAIN FOR 21 DAYS 63 tablet 3    ondansetron (ZOFRAN-ODT) 8 MG TbDL Take 1 tablet (8 mg total) by mouth 2 (two) times daily. 30 tablet 0    promethazine-dextromethorphan (PROMETHAZINE-DM) 6.25-15 mg/5 mL Syrp Take 5 mLs by mouth every 6 (six) hours as needed (cough). 120 mL 0    rosuvastatin (CRESTOR) 20 MG tablet Take 1 tablet (20 mg total) by mouth once daily. 90 tablet 3     No current facility-administered medications on file prior to visit.       REVIEW OF SYSTEMS:  ROS    GENERAL PHYSICAL EXAM:   [facility] 06/19/2024    GEN: well developed, well nourished, no acute distress   HENT: Normocephalic, atraumatic   EYES: No discharge, conjunctiva normal   NECK: Supple, non-tender   PULM: No wheezing, no respiratory distress   CV: RRR   ABD: Soft, non-tender    ORTHO EXAM:   Examination of bilateral hands and wrist reveals that there is no edema.  There are no major skin changes.  Palpation does produce mild tenderness over the left middle finger A1 pulley.  Flexion and extension of the fingers on both hands does reveal active triggering of the left middle finger but no triggering on the right hand.  She is noted have grossly intact sensation throughout the median radial ulnar distributions.  Has negative Tinel's but positive Durkan's test.  Has capillary refill is less than 2 seconds    RADIOLOGY:   EMG nerve conduction study has been reviewed.  It was consistent with severe right and moderate left carpal tunnel syndromes    ASSESSMENT:   Bilateral carpal tunnel syndrome and left middle finger triggering    PLAN:  1. I have discussed treatment options.  We have discussed the possibility of carpal tunnel release on the right as well  as carpal tunnel release and trigger finger release on the left.  We have discussed the risks and benefits.  Consent has been obtained     2. Will proceed with a right carpal tunnel release under Local anesthesia    3. We will consider releasing her left carpal tunnel and trigger finger after she recovers from the right side     4.  She will follow up 2 weeks after the right carpal tunnel release

## 2025-01-06 NOTE — PROGRESS NOTES
2025    Chief Complaint:  Chief Complaint   Patient presents with    Right Wrist - Numbness, Pain     EMG results    Left Wrist - Numbness, Pain     EMG results       HPI:  Vera De Leon is a 49 y.o. female, who presents to clinic today has a history of bilateral carpal tunnel syndrome.  She also had some triggering fingers.  The most significant of which is the left middle finger.  She is here today to discuss treatment options.  She has had a nerve conduction study.    PMHX:  Past Medical History:   Diagnosis Date    Allergy     Asthma     Chronic kidney disease     kidney stones    Hyperlipidemia     Hypertension        PSHX:  Past Surgical History:   Procedure Laterality Date    Breast reduction Bilateral 2002     SECTION  1999; 3/2005    TOTAL REDUCTION MAMMOPLASTY          TUBAL LIGATION  3/2005       FMHX:  Family History   Problem Relation Name Age of Onset    Stroke Paternal Grandfather      Alzheimer's disease Paternal Grandmother      Hypertension Mother      Breast cancer Neg Hx      Colon cancer Neg Hx      Ovarian cancer Neg Hx      Diabetes Neg Hx         SOCHX:  Social History     Tobacco Use    Smoking status: Never    Smokeless tobacco: Never   Substance Use Topics    Alcohol use: No       ALLERGIES:  Latex, Propofol, and Toradol  [ketorolac]    CURRENT MEDICATIONS:  Current Outpatient Medications on File Prior to Visit   Medication Sig Dispense Refill    albuterol (PROAIR HFA) 90 mcg/actuation inhaler Inhale 2 puffs into the lungs every 6 (six) hours as needed for Wheezing. Rescue 54 g 3    estradioL (IMVEXXY MAINTENANCE PACK) 10 mcg Inst Place 10 mcg vaginally twice a week. 8 each 11    estradioL (IMVEXXY STARTER PACK) 10 mcg InPk Place 10 mcg vaginally once daily. Place daily x 2 weeks then twice weekly 18 each 0    fluticasone-salmeterol diskus inhaler 100-50 mcg Inhale 1 puff into the lungs 2 (two) times daily. Controller 60 each 2    ipratropium (ATROVENT) 42 mcg (0.06  %) nasal spray Use 2 sprays by Each Nostril route 3 (three) times daily. 15 mL 2    losartan (COZAAR) 25 MG tablet Take 2 tablets (50 mg total) by mouth once daily. 180 tablet 3    norethindrone (AYGESTIN) 5 mg Tab TAKE ONE TABLET BY MOUTH DAILY FOR 21 DAYS, STOP FOR 7 DAYS, THEN START TAKING AGAIN FOR 21 DAYS 63 tablet 3    ondansetron (ZOFRAN-ODT) 8 MG TbDL Take 1 tablet (8 mg total) by mouth 2 (two) times daily. 30 tablet 0    promethazine-dextromethorphan (PROMETHAZINE-DM) 6.25-15 mg/5 mL Syrp Take 5 mLs by mouth every 6 (six) hours as needed (cough). 120 mL 0    rosuvastatin (CRESTOR) 20 MG tablet Take 1 tablet (20 mg total) by mouth once daily. 90 tablet 3     No current facility-administered medications on file prior to visit.       REVIEW OF SYSTEMS:  ROS    GENERAL PHYSICAL EXAM:   [facility] 06/19/2024    GEN: well developed, well nourished, no acute distress   HENT: Normocephalic, atraumatic   EYES: No discharge, conjunctiva normal   NECK: Supple, non-tender   PULM: No wheezing, no respiratory distress   CV: RRR   ABD: Soft, non-tender    ORTHO EXAM:   Examination of bilateral hands and wrist reveals that there is no edema.  There are no major skin changes.  Palpation does produce mild tenderness over the left middle finger A1 pulley.  Flexion and extension of the fingers on both hands does reveal active triggering of the left middle finger but no triggering on the right hand.  She is noted have grossly intact sensation throughout the median radial ulnar distributions.  Has negative Tinel's but positive Durkan's test.  Has capillary refill is less than 2 seconds    RADIOLOGY:   EMG nerve conduction study has been reviewed.  It was consistent with severe right and moderate left carpal tunnel syndromes    ASSESSMENT:   Bilateral carpal tunnel syndrome and left middle finger triggering    PLAN:  1. I have discussed treatment options.  We have discussed the possibility of carpal tunnel release on the right as well  as carpal tunnel release and trigger finger release on the left.  We have discussed the risks and benefits.  Consent has been obtained     2. Will proceed with a right carpal tunnel release under Local anesthesia    3. We will consider releasing her left carpal tunnel and trigger finger after she recovers from the right side     4.  She will follow up 2 weeks after the right carpal tunnel release

## 2025-01-13 DIAGNOSIS — G56.01 CARPAL TUNNEL SYNDROME OF RIGHT WRIST: Primary | ICD-10-CM

## 2025-01-13 RX ORDER — CEFAZOLIN SODIUM 2 G/50ML
2 SOLUTION INTRAVENOUS
Status: CANCELLED | OUTPATIENT
Start: 2025-01-13

## 2025-01-28 ENCOUNTER — HOSPITAL ENCOUNTER (OUTPATIENT)
Facility: HOSPITAL | Age: 50
Discharge: HOME | End: 2025-01-28
Attending: ORTHOPAEDIC SURGERY | Admitting: ORTHOPAEDIC SURGERY
Payer: COMMERCIAL

## 2025-01-28 DIAGNOSIS — G56.01 CARPAL TUNNEL SYNDROME OF RIGHT WRIST: ICD-10-CM

## 2025-01-28 PROCEDURE — 63600175 PHARM REV CODE 636 W HCPCS: Mod: PO | Performed by: PHYSICIAN ASSISTANT

## 2025-01-28 PROCEDURE — 71000015 HC POSTOP RECOV 1ST HR: Mod: PO | Performed by: ORTHOPAEDIC SURGERY

## 2025-01-28 PROCEDURE — 36000706: Mod: PO | Performed by: ORTHOPAEDIC SURGERY

## 2025-01-28 PROCEDURE — 64721 CARPAL TUNNEL SURGERY: CPT | Mod: RT,,, | Performed by: ORTHOPAEDIC SURGERY

## 2025-01-28 PROCEDURE — 63600175 PHARM REV CODE 636 W HCPCS: Mod: PO | Performed by: ORTHOPAEDIC SURGERY

## 2025-01-28 PROCEDURE — 36000707: Mod: PO | Performed by: ORTHOPAEDIC SURGERY

## 2025-01-28 RX ORDER — OXYCODONE HYDROCHLORIDE 5 MG/1
5 TABLET ORAL EVERY 6 HOURS PRN
Qty: 5 TABLET | Refills: 0 | Status: SHIPPED | OUTPATIENT
Start: 2025-01-28

## 2025-01-28 RX ORDER — CEFAZOLIN SODIUM 1 G/3ML
2 INJECTION, POWDER, FOR SOLUTION INTRAMUSCULAR; INTRAVENOUS
Status: COMPLETED | OUTPATIENT
Start: 2025-01-28 | End: 2025-01-28

## 2025-01-28 RX ORDER — BUPIVACAINE HYDROCHLORIDE 2.5 MG/ML
INJECTION, SOLUTION EPIDURAL; INFILTRATION; INTRACAUDAL
Status: DISCONTINUED | OUTPATIENT
Start: 2025-01-28 | End: 2025-01-28 | Stop reason: HOSPADM

## 2025-01-28 RX ORDER — LIDOCAINE HYDROCHLORIDE 10 MG/ML
INJECTION, SOLUTION EPIDURAL; INFILTRATION; INTRACAUDAL; PERINEURAL
Status: DISCONTINUED | OUTPATIENT
Start: 2025-01-28 | End: 2025-01-28 | Stop reason: HOSPADM

## 2025-01-28 NOTE — DISCHARGE SUMMARY
[facility] - Surgery  Discharge Note  Short Stay    Procedure(s) (LRB):  Right carpal tunnel release (Right)      OUTCOME: Patient tolerated treatment/procedure well without complication and is now ready for discharge.    DISPOSITION: Home or Self Care    FINAL DIAGNOSIS:  Carpal tunnel syndrome of right wrist    FOLLOWUP: In clinic    DISCHARGE INSTRUCTIONS:    Discharge Procedure Orders   Diet general     Activity as tolerated     Keep surgical extremity elevated     Lifting restrictions   Order Comments: Please limit lifting with the right arm and hand to 2-3 lb     Leave dressing on - Keep it clean, dry, and intact until clinic visit     Call MD for:  temperature >100.4     Call MD for:  persistent nausea and vomiting     Call MD for:  severe uncontrolled pain     Call MD for:  difficulty breathing, headache or visual disturbances     Call MD for:  redness, tenderness, or signs of infection (pain, swelling, redness, odor or green/yellow discharge around incision site)     Call MD for:  hives     Call MD for:  persistent dizziness or light-headedness     Call MD for:  extreme fatigue        TIME SPENT ON DISCHARGE: 15 minutes

## 2025-01-28 NOTE — OP NOTE
Vera De Leon  1975    DATE OF SURGERY: 1/28/2025     PRE-OPERATIVE DIAGNOSIS: right Carpal Tunnel Syndrome    POST-OPERATIVE DIAGNOSIS: right Carpal Tunnel Syndrome     ANESTHESIA TYPE: Local    BLOOD LOSS:  Less than 10 cc    TOURNIQUET TIME:  8 minutes    SURGEON: Dr. Grajeda    ASSISTANT:  Cuca Sims    PROCEDURE: right Carpal Tunnel Release    IMPLANTS: None    SPECIMENS: None    INDICATION:     Ms. De Leon presented to my clinic with a history of right carpal tunnel symptoms. Conservative treatments were initially tried. The patient did have relief with attempts at conservative treatment however has had a return of symptoms. An EMG and nerve conduction study has been performed. That study has shown compression of the median nerve at the wrist consistent with carpal tunnel syndrome. A discussion of the risks and benefits of carpal tunnel release has been performed, and informed consent for the procedure has been obtained.    PROCEDURE IN DETAIL:     Ms. De Leon was transported to the operating room and was placed supine on the operating room table. All appropriate points were padded. The right hand and arm was prepped and draped in the normal sterile fashion. Time out was called. The correct patient, correct operative site, correct procedure, antibiotic administration which consisted of 2 g of Ancef, and allergies to medications which are to Latex, Adhesive, Propofol, and Toradol  [ketorolac]  were reviewed. Time in was then called.     Attention was turned to right hand where a 3 cm incision was made in the palm of the hand. This was carried through the skin and subcutaneous tissues were dissected bluntly. The superficial palmar fascia was identified and was split in line with its fibers. The transverse carpal ligament was then identified and was incised for a distance of approximately 1 cm sharply.The median nerve was visualized and a carpal tunnel sled was placed below the transverse carpal  ligament but above the median nerve. Blunt dissection proximally over the transverse carpal ligament was performed and elevator was placed just above the transverse carpal ligament proximally. The ligament was identified. There were noted to be no penetrating nerve branches and at that point the carpal tunnel knife was used to incise the proximal transverse carpal ligament. Attention was then turned to the distal portion of the transverse carpal ligament. The carpal tunnel sled was again placed underneath the transverse carpal ligament but above the median nerve distally. Blunt dissection above the transverse carpal ligament distally was performed and an elevator was placed. The distal portion of the transverse carpal ligament was visualized and there were noted to be no penetrating branches of the nerve. At that point, tenotomy scissors were used to transect the distal portion of the transverse carpal ligament under direct visualization. The median nerve was then visualized. There were noted to be no specific lesions of the nerve and all of its branches were noted to be intact. The wound was then irrigated copiously. The tourniquet was let down and meticulous hemostasis was obtained with bipolar cautery. The wound was closed with 4-0 nylon suture superficially. The wound was then dressed with Xeroform, 4 x 4's, cast padding and a 3 inch Ace wrap was placed.      The patient was stable in the operating room and was transported to the recovery room in stable condition. All lap, needle, sponge, and equipment counts were correct at the end of the case.    POST-OPERATIVE PLAN:     The patient will keep a soft dressing in place for two weeks at which time she will followup with me. The dressing will be taken down, and the sutures will be removed at that time. She is not to get the dressing wet or to take it off. She will have a 2 pound weight limit of the left upper extremity for a total of 4 weeks.

## 2025-01-29 VITALS
WEIGHT: 179 LBS | HEART RATE: 80 BPM | BODY MASS INDEX: 35.14 KG/M2 | RESPIRATION RATE: 16 BRPM | DIASTOLIC BLOOD PRESSURE: 67 MMHG | SYSTOLIC BLOOD PRESSURE: 140 MMHG | OXYGEN SATURATION: 99 % | HEIGHT: 60 IN | TEMPERATURE: 97 F

## 2025-02-03 ENCOUNTER — TELEPHONE (OUTPATIENT)
Dept: ORTHOPEDICS | Facility: CLINIC | Age: 50
End: 2025-02-03
Payer: COMMERCIAL

## 2025-02-03 NOTE — TELEPHONE ENCOUNTER
Spoke to caller. She is going to come in today so that we can look at her arm and rewrap her hand.   ----- Message from Milan sent at 2/3/2025  8:05 AM CST -----  Patient would like a callback regarding a rash around her carpal tunnel surgery site.     [phone]

## 2025-02-05 ENCOUNTER — PATIENT MESSAGE (OUTPATIENT)
Dept: OBSTETRICS AND GYNECOLOGY | Facility: CLINIC | Age: 50
End: 2025-02-05
Payer: COMMERCIAL

## 2025-02-12 ENCOUNTER — OFFICE VISIT (OUTPATIENT)
Dept: ORTHOPEDICS | Facility: CLINIC | Age: 50
End: 2025-02-12
Payer: COMMERCIAL

## 2025-02-12 DIAGNOSIS — G56.01 CARPAL TUNNEL SYNDROME OF RIGHT WRIST: Primary | ICD-10-CM

## 2025-02-12 PROCEDURE — 1159F MED LIST DOCD IN RCRD: CPT | Mod: CPTII,S$GLB,, | Performed by: ORTHOPAEDIC SURGERY

## 2025-02-12 PROCEDURE — 99999 PR PBB SHADOW E&M-EST. PATIENT-LVL III: CPT | Mod: PBBFAC,,, | Performed by: ORTHOPAEDIC SURGERY

## 2025-02-12 PROCEDURE — 99024 POSTOP FOLLOW-UP VISIT: CPT | Mod: S$GLB,,, | Performed by: ORTHOPAEDIC SURGERY

## 2025-02-12 NOTE — PROGRESS NOTES
Vera De Leon is a 50 y.o. female who is approximately 2 weeks status post right carpal tunnel release. She is doing very well. She states that the majority of carpal tunnel symptoms are improved or resolved.    Physical exam: Examination of the right hand reveals that the incision is healing well. There is no significant edema,  erythema or drainage. Sensation is grossly intact median radial and ulnar distributions. Motor function of the thenar musculature is intact. Capillary refill less than 2 seconds in all of the digits. The Patient is able to make a full composite fist and fully extend the fingers without significant pain.    Assessment: Status post right carpal tunnel release    Plan:    1. Sutures were removed today and Steri-Strips are placed    2. Can begin hand washing in running water tomorrow    3. Continue a 2-3 pound weight limit to the arm and hand    4. Follow up with me in 2 weeks for repeat evaluation      23-Feb-2022 14:53

## 2025-02-14 ENCOUNTER — TELEPHONE (OUTPATIENT)
Dept: ORTHOPEDICS | Facility: CLINIC | Age: 50
End: 2025-02-14
Payer: COMMERCIAL

## 2025-02-14 NOTE — TELEPHONE ENCOUNTER
I spoke with the  patient she was concerned about her knee. She stated that she thought that her cut had come open. She observed the area of the wound. There were no bleeding or tearing of the skin. She asked was it open to put on some water proof strips over the knee when she takes a shower. I informed her that it was perfectly fine.

## 2025-02-17 ENCOUNTER — PATIENT MESSAGE (OUTPATIENT)
Dept: ORTHOPEDICS | Facility: CLINIC | Age: 50
End: 2025-02-17
Payer: COMMERCIAL

## 2025-02-17 ENCOUNTER — TELEPHONE (OUTPATIENT)
Dept: ORTHOPEDICS | Facility: CLINIC | Age: 50
End: 2025-02-17
Payer: COMMERCIAL

## 2025-02-17 NOTE — TELEPHONE ENCOUNTER
Spoke with caller. I requested patient send picture via [employer]. Upon review of the picture patient was called back and informed that incision looked appropriate. Instructed to not soak but was okay to shower and pat dry. Patient was agreeable with this and was instructed to call back with any questions or concerns.   ----- Message from Milan sent at 2/17/2025  8:39 AM CST -----  Patient would like a callback regarding questions about her incision site.[phone]

## 2025-02-26 ENCOUNTER — OFFICE VISIT (OUTPATIENT)
Dept: ORTHOPEDICS | Facility: CLINIC | Age: 50
End: 2025-02-26
Payer: COMMERCIAL

## 2025-02-26 DIAGNOSIS — G56.03 CARPAL TUNNEL SYNDROME, BILATERAL: Primary | ICD-10-CM

## 2025-02-26 PROCEDURE — 1159F MED LIST DOCD IN RCRD: CPT | Mod: CPTII,S$GLB,, | Performed by: ORTHOPAEDIC SURGERY

## 2025-02-26 PROCEDURE — 99999 PR PBB SHADOW E&M-EST. PATIENT-LVL III: CPT | Mod: PBBFAC,,, | Performed by: ORTHOPAEDIC SURGERY

## 2025-02-26 PROCEDURE — 99024 POSTOP FOLLOW-UP VISIT: CPT | Mod: S$GLB,,, | Performed by: ORTHOPAEDIC SURGERY

## 2025-02-26 NOTE — PROGRESS NOTES
Ms De Leon returns to clinic today.  Has a history of bilateral carpal tunnel syndrome.  She underwent a right carpal tunnel release 4 weeks ago.  She is overall doing well.  She still has some soreness over the incision site.  She states that her carpal tunnel symptoms are significantly better     Physical exam: Examination of the right hand reveals that the wound is well healed.  There has a moderate amount of scar tissue.  Palpation still produces some tenderness overlying the region of the incision.  Distally she has sensation intact and capillary refill is less than 2 seconds     Assessment:  Status post right carpal tunnel release     Plan:     1. She will start gentle scar massage with vitamin-E oil or cocoa butter     2. She can increase her weight-bearing as she tolerates to the right hand     3. She will follow up in approximately 2 months at which time we will review her right hand and consider setting her up for a left carpal tunnel release

## 2025-03-21 ENCOUNTER — OFFICE VISIT (OUTPATIENT)
Dept: OBSTETRICS AND GYNECOLOGY | Facility: CLINIC | Age: 50
End: 2025-03-21
Payer: COMMERCIAL

## 2025-03-21 VITALS
HEART RATE: 89 BPM | SYSTOLIC BLOOD PRESSURE: 149 MMHG | WEIGHT: 184.38 LBS | BODY MASS INDEX: 36.2 KG/M2 | DIASTOLIC BLOOD PRESSURE: 83 MMHG | HEIGHT: 60 IN

## 2025-03-21 DIAGNOSIS — N95.1 PERIMENOPAUSE: Primary | ICD-10-CM

## 2025-03-21 DIAGNOSIS — R63.5 WEIGHT GAIN: ICD-10-CM

## 2025-03-21 PROCEDURE — 99999 PR PBB SHADOW E&M-EST. PATIENT-LVL IV: CPT | Mod: PBBFAC,,, | Performed by: PHYSICIAN ASSISTANT

## 2025-03-21 NOTE — PROGRESS NOTES
Subjective:      Vera De Leon is a 50 y.o. female who presents to discuss weight loss. Lost weight with caring for granddaughter a few years ago and was skipping meals. Got down to 158lb. However, granddaughter went back to school and pt returned to her normal activities. She did gain some weight back but ok. Then last year started to gain more weight. Sick in 7/2024 requiring steroids and set her back. Then more steroids in 11/2024 for carpal tunnel. She did have surgery to correct carpal tunnel in 1/2025 and reduced exercise do to this. Continued to gain weight around this time. Overall has gained about 20lbs in the last 2 years. She does have a dairy allergy. Never been on a medication for weight loss. She is not interested in a medication for weight loss at this time. Cleared to exercise again in the last couple weeks and noticed a shifts. Feeling better in the last few weeks.   She is perimenopausal and LMP 6/2024. She did have light vaginal bleeding in 1/2025 for 2 days.    Routine Screening labs: 4/1/2024     Sleep: Around 8 hours per night     Stress: High but managing well    Exercise: aerobics with weights and core 2x per week and walking 2x per week     A typical day consists of:  Breakfast: macro bar and coffee with unsweetened almond milk + collagen  Lunch: varies- cauliflower chicken nuggets; protein smoothie  Dinner: steak with broccoli, salad   Snack: Keto cereal, skinny pop  Beverages: water, occasional Gatorade; Alcohol-very rare      Lab Visit on 12/30/2024   Component Date Value Ref Range Status    Follicle Stimulating Hormone 12/30/2024 5.56  See Text mIU/mL Final    Estradiol 12/30/2024 36  See Text pg/mL Final    Testosterone, Free 12/30/2024 <0.4  pg/mL Final    Testosterone, Total 12/30/2024 44  5 - 73 ng/dL Final       Past Medical History:   Diagnosis Date    Allergy     Asthma     Chronic kidney disease     kidney stones    Hyperlipidemia     Hypertension     Sleep apnea      Past  Surgical History:   Procedure Laterality Date    Breast reduction Bilateral 2002    CARPAL TUNNEL RELEASE Right 2025    Procedure: Right carpal tunnel release;  Surgeon: Jorge Grajeda MD;  Location: [facility];  Service: Orthopedics;  Laterality: Right;     SECTION  1999; 3/2005    TOTAL REDUCTION MAMMOPLASTY          TUBAL LIGATION  3/2005     Social History[1]  Family History   Problem Relation Name Age of Onset    Stroke Paternal Grandfather      Alzheimer's disease Paternal Grandmother      Hypertension Mother      Breast cancer Neg Hx      Colon cancer Neg Hx      Ovarian cancer Neg Hx      Diabetes Neg Hx       OB History    Para Term  AB Living   2 2 2   2   SAB IAB Ectopic Multiple Live Births       2      # Outcome Date GA Lbr Robert/2nd Weight Sex Type Anes PTL Lv   2 Term 05 40w0d  3.374 kg (7 lb 7 oz) F CS-LTranv EPI N CARI   1 Term 99 41w0d  4.082 kg (9 lb) F CS-LTranv EPI N CARI     Current Medications[2]    Review of Systems:  General: No fever, chills, or weight loss.  Chest: No chest pain, shortness of breath, or palpitations.  Breast: No pain, masses, or nipple discharge.  Vulva: No pain, lesions, or itching.  Vagina: No relaxation, itching, discharge, or lesions.  Abdomen: No pain, nausea, vomiting, diarrhea, or constipation.  Urinary: No incontinence, nocturia, frequency, or dysuria.  Extremities:  No leg cramps, edema, or calf pain.  Neurologic: No headaches, dizziness, or visual changes.    Objective:     Vitals:    25 1340   BP: (!) 149/83   Pulse: 89   Weight: 83.6 kg (184 lb 6.4 oz)   Height: 5' (1.524 m)   PainSc: 0-No pain     Body mass index is 36.01 kg/m².    PHYSICAL EXAM:  APPEARANCE: Well nourished, well developed, in no acute distress.  AFFECT: WNL, alert and oriented x 3  SKIN: No acne or hirsutism  CHEST: Good respiratory effect    Assessment:    Perimenopause    Weight gain      BMR calculated at 1376 calories per day.  Plan:    Goal is to shift body composition to increase lean muscle to improve metabolism and protect from osteoporosis.   We did discuss hormone fluctuations in perimenopause  Developed meal plan with handout of preferred foods.  Encouraged lean protein with every meal.  Goal of  100g of protein per day  Wide variety of fruits and vegetables. Limit starch to 1/3 cup or 1 piece of bread per meal.  Log in aria with goal of 7361-5610 calories a day  Increase strength workouts to 3x per week and continue walking for exercise  We did discuss benefits of GLP-1 if needed in the future or could consider other medications.  Follow up PRN    Instructed patient to call if she experiences any side effects or has any questions.    I spent a total of 35 minutes on the day of the visit.This includes face to face time and non-face to face time preparing to see the patient (eg, review of tests), obtaining and/or reviewing separately obtained history, documenting clinical information in the electronic or other health record, independently interpreting results and communicating results to the patient/family/caregiver, or care coordinator.             [1]   Social History  Tobacco Use    Smoking status: Never    Smokeless tobacco: Never   Substance Use Topics    Alcohol use: No    Drug use: No     Comment: Tubal ligation   [2]   Current Outpatient Medications:     albuterol (PROAIR HFA) 90 mcg/actuation inhaler, Inhale 2 puffs into the lungs every 6 (six) hours as needed for Wheezing. Rescue, Disp: 54 g, Rfl: 3    estradioL (IMVEXXY MAINTENANCE PACK) 10 mcg Inst, Place 10 mcg vaginally twice a week., Disp: 8 each, Rfl: 11    estradioL (IMVEXXY STARTER PACK) 10 mcg InPk, Place 10 mcg vaginally once daily. Place daily x 2 weeks then twice weekly, Disp: 18 each, Rfl: 0    fluticasone-salmeterol diskus inhaler 100-50 mcg, Inhale 1 puff into the lungs 2 (two) times daily. Controller, Disp: 60 each, Rfl: 2    ipratropium (ATROVENT) 42 mcg (0.06 %)  nasal spray, Use 2 sprays by Each Nostril route 3 (three) times daily., Disp: 15 mL, Rfl: 2    losartan (COZAAR) 25 MG tablet, Take 2 tablets (50 mg total) by mouth once daily., Disp: 180 tablet, Rfl: 3    norethindrone (AYGESTIN) 5 mg Tab, TAKE ONE TABLET BY MOUTH DAILY FOR 21 DAYS, STOP FOR 7 DAYS, THEN START TAKING AGAIN FOR 21 DAYS, Disp: 63 tablet, Rfl: 3    ondansetron (ZOFRAN-ODT) 8 MG TbDL, Take 1 tablet (8 mg total) by mouth 2 (two) times daily., Disp: 30 tablet, Rfl: 0    oxyCODONE (ROXICODONE) 5 MG immediate release tablet, Take 1 tablet (5 mg total) by mouth every 6 (six) hours as needed for Pain., Disp: 5 tablet, Rfl: 0    promethazine-dextromethorphan (PROMETHAZINE-DM) 6.25-15 mg/5 mL Syrp, Take 5 mLs by mouth every 6 (six) hours as needed (cough)., Disp: 120 mL, Rfl: 0    rosuvastatin (CRESTOR) 20 MG tablet, Take 1 tablet (20 mg total) by mouth once daily., Disp: 90 tablet, Rfl: 3

## 2025-04-04 ENCOUNTER — OFFICE VISIT (OUTPATIENT)
Dept: FAMILY MEDICINE | Facility: CLINIC | Age: 50
End: 2025-04-04
Payer: COMMERCIAL

## 2025-04-04 VITALS
BODY MASS INDEX: 35.79 KG/M2 | OXYGEN SATURATION: 99 % | WEIGHT: 182.31 LBS | HEART RATE: 86 BPM | SYSTOLIC BLOOD PRESSURE: 138 MMHG | DIASTOLIC BLOOD PRESSURE: 80 MMHG | HEIGHT: 60 IN | TEMPERATURE: 98 F

## 2025-04-04 DIAGNOSIS — J45.20 MILD INTERMITTENT ASTHMA WITHOUT COMPLICATION: ICD-10-CM

## 2025-04-04 DIAGNOSIS — E66.01 SEVERE OBESITY (BMI 35.0-39.9) WITH COMORBIDITY: ICD-10-CM

## 2025-04-04 DIAGNOSIS — I10 ESSENTIAL HYPERTENSION: ICD-10-CM

## 2025-04-04 DIAGNOSIS — Z00.00 WELL ADULT EXAM: Primary | ICD-10-CM

## 2025-04-04 DIAGNOSIS — Z23 NEED FOR VACCINATION: ICD-10-CM

## 2025-04-04 DIAGNOSIS — Z12.11 COLON CANCER SCREENING: ICD-10-CM

## 2025-04-04 DIAGNOSIS — E78.00 HYPERCHOLESTEROLEMIA: ICD-10-CM

## 2025-04-04 PROCEDURE — 99999 PR PBB SHADOW E&M-EST. PATIENT-LVL IV: CPT | Mod: PBBFAC,,, | Performed by: FAMILY MEDICINE

## 2025-04-04 RX ORDER — LOSARTAN POTASSIUM 50 MG/1
50 TABLET ORAL DAILY
Qty: 90 TABLET | Refills: 3 | Status: SHIPPED | OUTPATIENT
Start: 2025-04-04 | End: 2026-04-04

## 2025-04-04 RX ORDER — ROSUVASTATIN CALCIUM 20 MG/1
20 TABLET, COATED ORAL DAILY
Qty: 90 TABLET | Refills: 3 | Status: SHIPPED | OUTPATIENT
Start: 2025-04-04

## 2025-04-04 NOTE — PROGRESS NOTES
Subjective:       Patient ID: Vera De Leon is a 50 y.o. female.    Chief Complaint: Establish Care    Here today to establish care with a new PCP.   She has been seeing Dr. SHAQUILLE Tony    Patient here today for annual well adult exam.   Tries to eat a healthy diet.  Exercises regularly.    Immunizations: Due Tdap, Shingrix, Prevanry  Last Lab work: 2024  Colon Ca screening: ?  Breast Ca Screening: MMG 3/2024  Cervical Ca Screening:  PAP 3/2024    HTN:  She is on Losartan 50 mg.  Checks BP at home and getting 110-120/70s  HLD:  She is on Crestor.  Cholesterol checked 4/1/2024  Asthma:  She is on Advair with Albuterol PRN      Review of Systems   Constitutional:  Negative for activity change, appetite change, fatigue and fever.   Respiratory:  Negative for cough, shortness of breath and wheezing.    Cardiovascular:  Negative for chest pain and palpitations.   Gastrointestinal:  Negative for abdominal pain, constipation, diarrhea and nausea.   Skin:  Negative for pallor and rash.   Neurological:  Negative for dizziness, syncope, light-headedness and headaches.   Psychiatric/Behavioral:  The patient is not nervous/anxious.        Objective:      Vitals:    04/04/25 1252   BP: 138/80   Pulse: 86   Temp: 98.3 °F (36.8 °C)   TempSrc: Oral   SpO2: 99%   Weight: 82.7 kg (182 lb 5.1 oz)   Height: 5' (1.524 m)      Physical Exam  Constitutional:       General: She is not in acute distress.  Cardiovascular:      Rate and Rhythm: Normal rate and regular rhythm.      Heart sounds: Normal heart sounds. No murmur heard.  Pulmonary:      Effort: Pulmonary effort is normal. No respiratory distress.      Breath sounds: Normal breath sounds. No wheezing, rhonchi or rales.   Musculoskeletal:         General: No swelling.   Skin:     General: Skin is warm and dry.   Neurological:      General: No focal deficit present.      Mental Status: She is alert.   Psychiatric:         Mood and Affect: Mood normal.         Behavior: Behavior  normal.         Thought Content: Thought content normal.         Results for orders placed or performed in visit on 12/30/24   Follicle Stimulating Hormone    Collection Time: 12/30/24  2:25 PM   Result Value Ref Range    Follicle Stimulating Hormone 5.56 See Text mIU/mL   Estradiol    Collection Time: 12/30/24  2:25 PM   Result Value Ref Range    Estradiol 36 See Text pg/mL   Testosterone, Free    Collection Time: 12/30/24  2:25 PM   Result Value Ref Range    Testosterone, Free <0.4 pg/mL   Testosterone    Collection Time: 12/30/24  2:25 PM   Result Value Ref Range    Testosterone, Total 44 5 - 73 ng/dL      Assessment:       1. Well adult exam    2. Essential hypertension    3. Hypercholesterolemia    4. Mild intermittent asthma without complication    5. Severe obesity (BMI 35.0-39.9) with comorbidity    6. Need for vaccination    7. Colon cancer screening        Plan:       Well adult exam  -     CBC Auto Differential; Future; Expected date: 04/04/2025  -     Comprehensive Metabolic Panel; Future; Expected date: 04/04/2025  -     Hemoglobin A1C; Future; Expected date: 04/04/2025  -     Lipid Panel; Future; Expected date: 04/04/2025  -     TSH; Future; Expected date: 04/04/2025  -     Urinalysis, Reflex to Urine Culture; Future; Expected date: 04/04/2025  Continue to work on dietary improvements (decrease overall calorie intake, decrease sugar and carb intake, decrease animal protein intake)  Continue to exercise at least 30-40 minutes, 3 times per week  Immunizations were discussed and Tdap today. Prevnar and Shingrix later  Preventative exams were discussed and Colonoscopy ordered today to be done sometime this year  Essential hypertension  -     Comprehensive Metabolic Panel; Future; Expected date: 04/04/2025  -     losartan (COZAAR) 50 MG tablet; Take 1 tablet (50 mg total) by mouth once daily.  Dispense: 90 tablet; Refill: 3  Continue Losartan 50 mg daily.  Discussed taking medication at  night  Hypercholesterolemia  -     Lipid Panel; Future; Expected date: 04/04/2025  -     rosuvastatin (CRESTOR) 20 MG tablet; Take 1 tablet (20 mg total) by mouth once daily.  Dispense: 90 tablet; Refill: 3  Continue crestor 20 mg.  Take at night  Mild intermittent asthma without complication    Severe obesity (BMI 35.0-39.9) with comorbidity  Work on weight loss  Need for vaccination  -     DIPH,PERTUSS(ACEL),TET VAC(PF)(ADULT)(ADACEL)(TDaP)    Colon cancer screening  -     Case Request Endoscopy: COLONOSCOPY    F/u in 1 year for well visit      Medication List with Changes/Refills   Current Medications    ALBUTEROL (PROAIR HFA) 90 MCG/ACTUATION INHALER    Inhale 2 puffs into the lungs every 6 (six) hours as needed for Wheezing. Rescue    ESTRADIOL (IMVEXXY MAINTENANCE PACK) 10 MCG INST    Place 10 mcg vaginally twice a week.    ESTRADIOL (IMVEXXY STARTER PACK) 10 MCG INPK    Place 10 mcg vaginally once daily. Place daily x 2 weeks then twice weekly    FLUTICASONE-SALMETEROL DISKUS INHALER 100-50 MCG    Inhale 1 puff into the lungs 2 (two) times daily. Controller    NORETHINDRONE (AYGESTIN) 5 MG TAB    TAKE ONE TABLET BY MOUTH DAILY FOR 21 DAYS, STOP FOR 7 DAYS, THEN START TAKING AGAIN FOR 21 DAYS   Changed and/or Refilled Medications    Modified Medication Previous Medication    LOSARTAN (COZAAR) 50 MG TABLET losartan (COZAAR) 25 MG tablet       Take 1 tablet (50 mg total) by mouth once daily.    Take 2 tablets (50 mg total) by mouth once daily.    ROSUVASTATIN (CRESTOR) 20 MG TABLET rosuvastatin (CRESTOR) 20 MG tablet       Take 1 tablet (20 mg total) by mouth once daily.    Take 1 tablet (20 mg total) by mouth once daily.   Discontinued Medications    IPRATROPIUM (ATROVENT) 42 MCG (0.06 %) NASAL SPRAY    Use 2 sprays by Each Nostril route 3 (three) times daily.    ONDANSETRON (ZOFRAN-ODT) 8 MG TBDL    Take 1 tablet (8 mg total) by mouth 2 (two) times daily.    OXYCODONE (ROXICODONE) 5 MG IMMEDIATE RELEASE TABLET     Take 1 tablet (5 mg total) by mouth every 6 (six) hours as needed for Pain.    PROMETHAZINE-DEXTROMETHORPHAN (PROMETHAZINE-DM) 6.25-15 MG/5 ML SYRP    Take 5 mLs by mouth every 6 (six) hours as needed (cough).

## 2025-04-09 ENCOUNTER — HOSPITAL ENCOUNTER (OUTPATIENT)
Dept: RADIOLOGY | Facility: OTHER | Age: 50
Discharge: HOME | End: 2025-04-09
Attending: FAMILY MEDICINE
Payer: COMMERCIAL

## 2025-04-09 ENCOUNTER — OFFICE VISIT (OUTPATIENT)
Facility: CLINIC | Age: 50
End: 2025-04-09
Attending: OBSTETRICS & GYNECOLOGY
Payer: COMMERCIAL

## 2025-04-09 ENCOUNTER — TELEPHONE (OUTPATIENT)
Dept: GASTROENTEROLOGY | Facility: CLINIC | Age: 50
End: 2025-04-09
Payer: COMMERCIAL

## 2025-04-09 VITALS
WEIGHT: 182.13 LBS | HEIGHT: 60 IN | SYSTOLIC BLOOD PRESSURE: 141 MMHG | HEART RATE: 81 BPM | BODY MASS INDEX: 35.76 KG/M2 | DIASTOLIC BLOOD PRESSURE: 82 MMHG

## 2025-04-09 DIAGNOSIS — Z12.31 ENCOUNTER FOR SCREENING MAMMOGRAM FOR BREAST CANCER: ICD-10-CM

## 2025-04-09 DIAGNOSIS — Z12.31 SCREENING MAMMOGRAM, ENCOUNTER FOR: ICD-10-CM

## 2025-04-09 DIAGNOSIS — Z01.419 ENCOUNTER FOR GYNECOLOGICAL EXAMINATION WITHOUT ABNORMAL FINDING: Primary | ICD-10-CM

## 2025-04-09 DIAGNOSIS — N92.6 IRREGULAR MENSES: ICD-10-CM

## 2025-04-09 DIAGNOSIS — N95.2 VAGINAL ATROPHY: ICD-10-CM

## 2025-04-09 DIAGNOSIS — N95.1 PERIMENOPAUSE: ICD-10-CM

## 2025-04-09 PROCEDURE — 77063 BREAST TOMOSYNTHESIS BI: CPT | Mod: 26,,, | Performed by: RADIOLOGY

## 2025-04-09 PROCEDURE — 3008F BODY MASS INDEX DOCD: CPT | Mod: CPTII,S$GLB,, | Performed by: OBSTETRICS & GYNECOLOGY

## 2025-04-09 PROCEDURE — 1160F RVW MEDS BY RX/DR IN RCRD: CPT | Mod: CPTII,S$GLB,, | Performed by: OBSTETRICS & GYNECOLOGY

## 2025-04-09 PROCEDURE — 77063 BREAST TOMOSYNTHESIS BI: CPT | Mod: TC

## 2025-04-09 PROCEDURE — 4010F ACE/ARB THERAPY RXD/TAKEN: CPT | Mod: CPTII,S$GLB,, | Performed by: OBSTETRICS & GYNECOLOGY

## 2025-04-09 PROCEDURE — 3077F SYST BP >= 140 MM HG: CPT | Mod: CPTII,S$GLB,, | Performed by: OBSTETRICS & GYNECOLOGY

## 2025-04-09 PROCEDURE — 1159F MED LIST DOCD IN RCRD: CPT | Mod: CPTII,S$GLB,, | Performed by: OBSTETRICS & GYNECOLOGY

## 2025-04-09 PROCEDURE — 77067 SCR MAMMO BI INCL CAD: CPT | Mod: 26,,, | Performed by: RADIOLOGY

## 2025-04-09 PROCEDURE — 99396 PREV VISIT EST AGE 40-64: CPT | Mod: S$GLB,,, | Performed by: OBSTETRICS & GYNECOLOGY

## 2025-04-09 PROCEDURE — 3079F DIAST BP 80-89 MM HG: CPT | Mod: CPTII,S$GLB,, | Performed by: OBSTETRICS & GYNECOLOGY

## 2025-04-09 PROCEDURE — 99999 PR PBB SHADOW E&M-EST. PATIENT-LVL III: CPT | Mod: PBBFAC,,, | Performed by: OBSTETRICS & GYNECOLOGY

## 2025-04-09 RX ORDER — NORETHINDRONE 5 MG/1
TABLET ORAL
Qty: 21 TABLET | Refills: 12 | Status: SHIPPED | OUTPATIENT
Start: 2025-04-09

## 2025-04-09 RX ORDER — ESTRADIOL 10 UG/1
10 INSERT VAGINAL
Qty: 24 EACH | Refills: 4 | Status: SHIPPED | OUTPATIENT
Start: 2025-04-10

## 2025-04-09 NOTE — PROGRESS NOTES
Subjective:       Patient ID: Vera De eLon is a 50 y.o. female.    Chief Complaint:  Well Woman and Gynecologic Exam      History of Present Illness  Gynecologic Exam  Pertinent negatives include no abdominal pain, back pain or headaches.       Vera De Leon is a 50 y.o. female  here for her annual GYN exam.  She walks 4 miles on , Tues-aerobics with weight, Wed-walks 4 miles and does weights, Thurs-aerobics with weights.  She describes her periods as very spaced out , irregular , had one last , and now just started a cycle 3 days ago , reports hot flashes at night intermittently, but not too disturbing  denies break through bleeding.   denies vaginal itching or irritation.  Denies vaginal discharge.  She is sexually active. She has had 1 partner for 28 years .  She uses bilateral tubal ligation for contraception.   History of abnormal pap: No  Last Pap: 3/25/2024 Normal , neg HR HPV  Last MMG: normal-3-:BI-RADS Category 1: Negative -routine follow-up in 12 months  Last Colonoscopy:  scheduled  denies domestic violence. She does feel safe at home.     Past Medical History:   Diagnosis Date    Allergy     Asthma     Chronic kidney disease     kidney stones    Hyperlipidemia     Hypertension     Sleep apnea      Past Surgical History:   Procedure Laterality Date    Breast reduction Bilateral 2002    CARPAL TUNNEL RELEASE Right 2025    Procedure: Right carpal tunnel release;  Surgeon: Jorge Grajeda MD;  Location: [facility];  Service: Orthopedics;  Laterality: Right;     SECTION  1999; 3/2005    TOTAL REDUCTION MAMMOPLASTY          TUBAL LIGATION  3/2005     Social History[1]  Family History   Problem Relation Name Age of Onset    Stroke Paternal Grandfather      Alzheimer's disease Paternal Grandmother      Hypertension Mother      Breast cancer Neg Hx      Colon cancer Neg Hx      Ovarian cancer Neg Hx      Diabetes Neg Hx       OB History           2    Para   2    Term   2            AB        Living   2         SAB        IAB        Ectopic        Multiple        Live Births   2                 BP (!) 141/82 (Patient Position: Sitting)   Pulse 81   Ht 5' (1.524 m)   Wt 82.6 kg (182 lb 1.6 oz)   LMP 2025 (Exact Date)   BMI 35.56 kg/m²         GYN & OB History  Patient's last menstrual period was 2025 (exact date).       OB History    Para Term  AB Living   2 2 2   2   SAB IAB Ectopic Multiple Live Births       2      # Outcome Date GA Lbr Robert/2nd Weight Sex Type Anes PTL Lv   2 Term 05 40w0d  3.374 kg (7 lb 7 oz) F CS-LTranv EPI N CARI   1 Term 99 41w0d  4.082 kg (9 lb) F CS-LTranv EPI N CARI       Review of Systems  Review of Systems   Constitutional:  Negative for activity change, appetite change, fatigue and unexpected weight change.   HENT: Negative.     Eyes:  Negative for visual disturbance.   Respiratory:  Negative for shortness of breath and wheezing.    Cardiovascular:  Negative for chest pain, palpitations and leg swelling.   Gastrointestinal:  Negative for abdominal pain, bloating and blood in stool.   Endocrine: Negative for diabetes and hair loss.   Genitourinary:  Positive for hot flashes. Negative for decreased libido, dyspareunia and vaginal dryness.   Musculoskeletal:  Negative for back pain and joint swelling.   Integumentary:  Negative for acne, hair changes and nipple discharge.   Neurological:  Negative for headaches.   Hematological:  Does not bruise/bleed easily.   Psychiatric/Behavioral:  Negative for depression and sleep disturbance. The patient is not nervous/anxious.    Breast: Negative for mastodynia and nipple discharge          Objective:      Physical Exam:   Constitutional: She is oriented to person, place, and time. She appears well-developed and well-nourished.    HENT:   Head: Normocephalic and atraumatic.    Eyes: Pupils are equal, round, and reactive to light. EOM are  normal.     Cardiovascular:  Normal rate and regular rhythm.             Pulmonary/Chest: Effort normal and breath sounds normal.   BREASTS:  no mass, no tenderness, no deformity and no retraction. Right breast exhibits no inverted nipple, no mass, no nipple discharge, no skin change, no tenderness, no bleeding and no swelling. Left breast exhibits no inverted nipple, no mass, no nipple discharge, no skin change, no tenderness, no bleeding and no swelling. Breasts are symmetrical.      Bilateral reduction scars        Abdominal: Soft. Bowel sounds are normal.     Genitourinary:    Pelvic exam was performed with patient supine.      Genitourinary Comments: PELVIC: Normal external genitalia without lesions.  Normal hair distribution.  Adequate perineal body, normal urethral meatus.  Vagina moist and moderately well rugated without lesions or discharge.  Cervix pink, without lesions, discharge or tenderness. Menstruating.  No significant cystocele or rectocele.  Bimanual exam shows uterus to be normal size, regular, mobile and nontender.  Adnexa without masses or tenderness.                   Musculoskeletal: Normal range of motion and moves all extremeties.       Neurological: She is alert and oriented to person, place, and time.    Skin: Skin is warm and dry.    Psychiatric: She has a normal mood and affect.              Assessment:        1. Encounter for gynecological examination without abnormal finding    2. Screening mammogram, encounter for    3. Irregular menses    4. Vaginal atrophy    5. Perimenopause                Plan:        Problem List Items Addressed This Visit    None  Visit Diagnoses         Encounter for gynecological examination without abnormal finding    -  Primary      Screening mammogram, encounter for        Relevant Orders    Mammo Digital Screening Bilat w/ Kulwant (XPD)      Irregular menses        Relevant Medications    norethindrone (AYGESTIN) 5 mg Tab      Vaginal atrophy        Relevant  Medications    estradioL (IMVEXXY MAINTENANCE PACK) 10 mcg Inst (Start on 4/10/2025)      Perimenopause            COUNSELING:  The patient was counseled today on regular weight bearing exercise. Patient was counseled today on the new ACS guidelines for cervical cytology screening as well as the current recommendations for breast cancer screening. She was counseled to follow up with her PCP for other routine health maintenance. Counseling session lasted approximately 10 minutes, and all her questions were answered.  For women over the age of 65, you can stop having cervical cancer screenings if you have never had abnormal cervical cells or cervical cancer, and youve had three negative Pap tests in a row. (You also can stop screening if youve had two negative Pap and HPV tests in a row in the past 10 years, with at least one test in the past 5 years.),    As of April 1, 2021, the Cures Act has been passed nationally. This new law requires that all doctors progress notes, lab results, pathology reports and radiology reports be released IMMEDIATELY to the patient in the patient portal. That means that the results are released to you at the EXACT same time they are released to me. Therefore, with all of the patients that I have I am not able to reply to each patient exactly when the results come in. So there will be a delay from when you see the results to when I see them and have time to come up with a response to send you. Also I only see these results when I am on the computer at work. So if the results come in over the weekend or after 5 pm of a work day, I will not see them until the next business day. As you can tell, this is a challenge as a provider to give every patient the quick response they hope for and deserve. So please be patient!      Thanks for your understanding and patience.      Follow up in about 1 year (around 4/9/2026), or if symptoms worsen or fail to improve.            [1]   Social  History  Socioeconomic History    Marital status:    Tobacco Use    Smoking status: Never    Smokeless tobacco: Never   Substance and Sexual Activity    Alcohol use: No    Drug use: No     Comment: Tubal ligation    Sexual activity: Yes     Partners: Male     Birth control/protection: Surgical     Comment:  x 28 years since 1997     Social Drivers of Health     Financial Resource Strain: Low Risk  (3/11/2024)    Overall Financial Resource Strain (CARDIA)     Difficulty of Paying Living Expenses: Not hard at all   Food Insecurity: No Food Insecurity (3/11/2024)    Hunger Vital Sign     Worried About Running Out of Food in the Last Year: Never true     Ran Out of Food in the Last Year: Never true   Transportation Needs: No Transportation Needs (3/11/2024)    PRAPARE - Transportation     Lack of Transportation (Medical): No     Lack of Transportation (Non-Medical): No   Physical Activity: Sufficiently Active (3/11/2024)    Exercise Vital Sign     Days of Exercise per Week: 4 days     Minutes of Exercise per Session: 60 min   Stress: No Stress Concern Present (3/11/2024)    [employer] - Occupational Stress Questionnaire     Feeling of Stress : Only a little   Housing Stability: High Risk (3/11/2024)    Housing Stability Vital Sign     Unable to Pay for Housing in the Last Year: Yes     Number of Places Lived in the Last Year: 1     Unstable Housing in the Last Year: No

## 2025-04-14 ENCOUNTER — RESULTS FOLLOW-UP (OUTPATIENT)
Dept: FAMILY MEDICINE | Facility: CLINIC | Age: 50
End: 2025-04-14

## 2025-04-14 ENCOUNTER — LAB VISIT (OUTPATIENT)
Dept: LAB | Facility: HOSPITAL | Age: 50
End: 2025-04-14
Attending: FAMILY MEDICINE
Payer: COMMERCIAL

## 2025-04-14 DIAGNOSIS — E78.00 HYPERCHOLESTEROLEMIA: ICD-10-CM

## 2025-04-14 DIAGNOSIS — Z00.00 WELL ADULT EXAM: ICD-10-CM

## 2025-04-14 DIAGNOSIS — I10 ESSENTIAL HYPERTENSION: ICD-10-CM

## 2025-04-14 LAB
ABSOLUTE EOSINOPHIL (OHS): 0.48 K/UL
ABSOLUTE MONOCYTE (OHS): 0.4 K/UL (ref 0.3–1)
ABSOLUTE NEUTROPHIL COUNT (OHS): 4.03 K/UL (ref 1.8–7.7)
ALBUMIN SERPL BCP-MCNC: 3.9 G/DL (ref 3.5–5.2)
ALP SERPL-CCNC: 56 UNIT/L (ref 40–150)
ALT SERPL W/O P-5'-P-CCNC: 28 UNIT/L (ref 10–44)
ANION GAP (OHS): 10 MMOL/L (ref 8–16)
AST SERPL-CCNC: 23 UNIT/L (ref 11–45)
BASOPHILS # BLD AUTO: 0.06 K/UL
BASOPHILS NFR BLD AUTO: 0.8 %
BILIRUB SERPL-MCNC: 0.4 MG/DL (ref 0.1–1)
BILIRUB UR QL STRIP.AUTO: NEGATIVE
BUN SERPL-MCNC: 11 MG/DL (ref 6–20)
CALCIUM SERPL-MCNC: 9.4 MG/DL (ref 8.7–10.5)
CHLORIDE SERPL-SCNC: 111 MMOL/L (ref 95–110)
CHOLEST SERPL-MCNC: 145 MG/DL (ref 120–199)
CHOLEST/HDLC SERPL: 2.9 {RATIO} (ref 2–5)
CLARITY UR: CLEAR
CO2 SERPL-SCNC: 22 MMOL/L (ref 23–29)
COLOR UR AUTO: YELLOW
CREAT SERPL-MCNC: 0.8 MG/DL (ref 0.5–1.4)
EAG (OHS): 100 MG/DL (ref 68–131)
ERYTHROCYTE [DISTWIDTH] IN BLOOD BY AUTOMATED COUNT: 11.9 % (ref 11.5–14.5)
GFR SERPLBLD CREATININE-BSD FMLA CKD-EPI: >60 ML/MIN/1.73/M2
GLUCOSE SERPL-MCNC: 99 MG/DL (ref 70–110)
GLUCOSE UR QL STRIP: NEGATIVE
HBA1C MFR BLD: 5.1 % (ref 4–5.6)
HCT VFR BLD AUTO: 44.1 % (ref 37–48.5)
HDLC SERPL-MCNC: 50 MG/DL (ref 40–75)
HDLC SERPL: 34.5 % (ref 20–50)
HGB BLD-MCNC: 14.9 GM/DL (ref 12–16)
HGB UR QL STRIP: NEGATIVE
IMM GRANULOCYTES # BLD AUTO: 0.02 K/UL (ref 0–0.04)
IMM GRANULOCYTES NFR BLD AUTO: 0.3 % (ref 0–0.5)
KETONES UR QL STRIP: NEGATIVE
LDLC SERPL CALC-MCNC: 84.6 MG/DL (ref 63–159)
LEUKOCYTE ESTERASE UR QL STRIP: NEGATIVE
LYMPHOCYTES # BLD AUTO: 2.07 K/UL (ref 1–4.8)
MCH RBC QN AUTO: 31.3 PG (ref 27–31)
MCHC RBC AUTO-ENTMCNC: 33.8 G/DL (ref 32–36)
MCV RBC AUTO: 93 FL (ref 82–98)
NITRITE UR QL STRIP: NEGATIVE
NONHDLC SERPL-MCNC: 95 MG/DL
NUCLEATED RBC (/100WBC) (OHS): 0 /100 WBC
PH UR STRIP: >8 [PH]
PLATELET # BLD AUTO: 321 K/UL (ref 150–450)
PMV BLD AUTO: 9.5 FL (ref 9.2–12.9)
POTASSIUM SERPL-SCNC: 5 MMOL/L (ref 3.5–5.1)
PROT SERPL-MCNC: 7.1 GM/DL (ref 6–8.4)
PROT UR QL STRIP: NEGATIVE
RBC # BLD AUTO: 4.76 M/UL (ref 4–5.4)
RELATIVE EOSINOPHIL (OHS): 6.8 %
RELATIVE LYMPHOCYTE (OHS): 29.3 % (ref 18–48)
RELATIVE MONOCYTE (OHS): 5.7 % (ref 4–15)
RELATIVE NEUTROPHIL (OHS): 57.1 % (ref 38–73)
SODIUM SERPL-SCNC: 143 MMOL/L (ref 136–145)
SP GR UR STRIP: 1.01
TRIGL SERPL-MCNC: 52 MG/DL (ref 30–150)
TSH SERPL-ACNC: 1.97 UIU/ML (ref 0.4–4)
WBC # BLD AUTO: 7.06 K/UL (ref 3.9–12.7)

## 2025-04-14 PROCEDURE — 84443 ASSAY THYROID STIM HORMONE: CPT

## 2025-04-14 PROCEDURE — 85025 COMPLETE CBC W/AUTO DIFF WBC: CPT

## 2025-04-14 PROCEDURE — 83036 HEMOGLOBIN GLYCOSYLATED A1C: CPT

## 2025-04-14 PROCEDURE — 80061 LIPID PANEL: CPT

## 2025-04-14 PROCEDURE — 80053 COMPREHEN METABOLIC PANEL: CPT

## 2025-04-14 PROCEDURE — 36415 COLL VENOUS BLD VENIPUNCTURE: CPT | Mod: PO

## 2025-04-14 PROCEDURE — 81003 URINALYSIS AUTO W/O SCOPE: CPT | Mod: PO

## 2025-04-28 ENCOUNTER — OFFICE VISIT (OUTPATIENT)
Dept: ORTHOPEDICS | Facility: CLINIC | Age: 50
End: 2025-04-28
Payer: COMMERCIAL

## 2025-04-28 VITALS — WEIGHT: 182.13 LBS | HEIGHT: 60 IN | BODY MASS INDEX: 35.76 KG/M2

## 2025-04-28 DIAGNOSIS — G56.02 CARPAL TUNNEL SYNDROME ON LEFT: Primary | ICD-10-CM

## 2025-04-28 DIAGNOSIS — M65.332 TRIGGER FINGER, LEFT MIDDLE FINGER: ICD-10-CM

## 2025-04-28 PROCEDURE — 4010F ACE/ARB THERAPY RXD/TAKEN: CPT | Mod: CPTII,S$GLB,, | Performed by: ORTHOPAEDIC SURGERY

## 2025-04-28 PROCEDURE — 3044F HG A1C LEVEL LT 7.0%: CPT | Mod: CPTII,S$GLB,, | Performed by: ORTHOPAEDIC SURGERY

## 2025-04-28 PROCEDURE — 99024 POSTOP FOLLOW-UP VISIT: CPT | Mod: ,,, | Performed by: ORTHOPAEDIC SURGERY

## 2025-04-28 PROCEDURE — 3008F BODY MASS INDEX DOCD: CPT | Mod: CPTII,S$GLB,, | Performed by: ORTHOPAEDIC SURGERY

## 2025-04-28 PROCEDURE — 99999 PR PBB SHADOW E&M-EST. PATIENT-LVL III: CPT | Mod: PBBFAC,,, | Performed by: ORTHOPAEDIC SURGERY

## 2025-04-28 PROCEDURE — 1159F MED LIST DOCD IN RCRD: CPT | Mod: CPTII,S$GLB,, | Performed by: ORTHOPAEDIC SURGERY

## 2025-04-28 PROCEDURE — 99213 OFFICE O/P EST LOW 20 MIN: CPT | Mod: 24,S$GLB,, | Performed by: ORTHOPAEDIC SURGERY

## 2025-04-28 NOTE — PATIENT INSTRUCTIONS
Surgery Instructions:     Your surgery is scheduled on 5/22/25 at the [facility]: [address], [place].    The pre-op department will be in contact with you prior to your procedure to review medications and instructions.       Nothing to eat or drink after midnight prior to day of surgery.    You should STOP taking any blood thinners 5 days prior to surgery.     Please obtain medical and cardiac clearance as advised prior to surgery. All preoperative testing should be done as soon as possible as it may be needed to obtain clearance.    The surgery center will contact you the day prior to surgery to advise you of your arrival time for surgery.     Your post op appointment is scheduled on 6/6/25 @ 11:20.

## 2025-04-28 NOTE — PROGRESS NOTES
2025    Chief Complaint:  Chief Complaint   Patient presents with    Right Wrist - Post-op Evaluation       HPI:  Vera De Leon is a 50 y.o. female, who presents to clinic today has a history of bilateral carpal tunnel syndrome.  She underwent a right carpal tunnel release and did relatively well.  She is still having significant symptoms of carpal tunnel on the left side as well as triggering of her left middle finger.  She is here today to discuss further treatments    PMHX:  Past Medical History:   Diagnosis Date    Allergy     Asthma     Chronic kidney disease     kidney stones    Hyperlipidemia     Hypertension     Sleep apnea        PSHX:  Past Surgical History:   Procedure Laterality Date    Breast reduction Bilateral 2002    CARPAL TUNNEL RELEASE Right 2025    Procedure: Right carpal tunnel release;  Surgeon: Jorge Grajeda MD;  Location: [facility] OR;  Service: Orthopedics;  Laterality: Right;     SECTION  1999; 3/2005    TOTAL REDUCTION MAMMOPLASTY          TUBAL LIGATION  3/2005       FMHX:  Family History   Problem Relation Name Age of Onset    Stroke Paternal Grandfather      Alzheimer's disease Paternal Grandmother      Hypertension Mother      Breast cancer Neg Hx      Colon cancer Neg Hx      Ovarian cancer Neg Hx      Diabetes Neg Hx         SOCHX:  Social History     Tobacco Use    Smoking status: Never    Smokeless tobacco: Never   Substance Use Topics    Alcohol use: No       ALLERGIES:  Latex, Adhesive, Propofol, and Toradol  [ketorolac]    CURRENT MEDICATIONS:  Medications Ordered Prior to Encounter[1]    REVIEW OF SYSTEMS:  ROS    GENERAL PHYSICAL EXAM:   Ht 5' (1.524 m)   Wt 82.6 kg (182 lb 1.6 oz)   LMP 2025 (Exact Date)   BMI 35.56 kg/m²    GEN: well developed, well nourished, no acute distress   HENT: Normocephalic, atraumatic   EYES: No discharge, conjunctiva normal   NECK: Supple, non-tender   PULM: No wheezing, no respiratory distress   CV:  RRR   ABD: Soft, non-tender    ORTHO EXAM:   Examination of the right hand reveals that there has a well-healed incision in the palm of the hand.  Palpation over the incision reveals that there is still a small amount of scar tissue and minimal tenderness.  She does report grossly intact sensation in the median distribution.  He is able make a full composite fist     Examination of the left hand reveals that there is no edema.  There are no skin changes.  Palpation does produce mild tenderness over the A1 pulley of the middle finger.  Flexion and extension reveals active triggering of the finger.  She does report grossly intact sensation throughout the median distribution.  Has a negative Tinel's but positive Durkan's.  Has a 2+ radial pulse    RADIOLOGY:   None    ASSESSMENT:   Bilateral carpal tunnel syndrome, left middle finger triggering    PLAN:  1. I have discussed treatment with the patient.  I have discussed the possibility of left carpal tunnel release and left middle finger trigger release.  We did discuss the risks and benefits and consent has been obtained     2.  Will proceed with left carpal tunnel release and left middle finger trigger release under local anesthesia     3.  She will follow up with me 2 weeks after the surgery          [1]   Current Outpatient Medications on File Prior to Visit   Medication Sig Dispense Refill    albuterol (PROAIR HFA) 90 mcg/actuation inhaler Inhale 2 puffs into the lungs every 6 (six) hours as needed for Wheezing. Rescue 54 g 3    DIPH,PERTUSS,ACEL,TET-VAC,(ADACEL,TDAP ADOLESN/ADULT,PF,)2 Lf-(2.5-5-3-5 mcg)-5Lf/0.5 mL injection Inject into the muscle. 0.5 mL 0    estradioL (IMVEXXY MAINTENANCE PACK) 10 mcg Inst Place 10 mcg vaginally twice a week. 24 each 4    fluticasone-salmeterol diskus inhaler 100-50 mcg Inhale 1 puff into the lungs 2 (two) times daily. Controller 60 each 2    losartan (COZAAR) 50 MG tablet Take 1 tablet (50 mg total) by mouth once daily. 90  tablet 3    norethindrone (AYGESTIN) 5 mg Tab TAKE ONE TABLET BY MOUTH DAILY FOR 21 DAYS, STOP FOR 7 DAYS, THEN START TAKING AGAIN FOR 21 DAYS 21 tablet 12    rosuvastatin (CRESTOR) 20 MG tablet Take 1 tablet (20 mg total) by mouth once daily. 90 tablet 3     No current facility-administered medications on file prior to visit.

## 2025-04-30 DIAGNOSIS — G56.02 CARPAL TUNNEL SYNDROME ON LEFT: Primary | ICD-10-CM

## 2025-04-30 DIAGNOSIS — M65.332 TRIGGER FINGER, LEFT MIDDLE FINGER: ICD-10-CM

## 2025-04-30 RX ORDER — CEFAZOLIN SODIUM 2 G/50ML
2 SOLUTION INTRAVENOUS
OUTPATIENT
Start: 2025-04-30

## 2025-05-05 ENCOUNTER — OFFICE VISIT (OUTPATIENT)
Dept: FAMILY MEDICINE | Facility: CLINIC | Age: 50
End: 2025-05-05
Payer: COMMERCIAL

## 2025-05-05 VITALS
HEART RATE: 72 BPM | OXYGEN SATURATION: 99 % | DIASTOLIC BLOOD PRESSURE: 64 MMHG | TEMPERATURE: 98 F | WEIGHT: 183.63 LBS | SYSTOLIC BLOOD PRESSURE: 142 MMHG | BODY MASS INDEX: 36.05 KG/M2 | HEIGHT: 60 IN

## 2025-05-05 DIAGNOSIS — J01.90 ACUTE NON-RECURRENT SINUSITIS, UNSPECIFIED LOCATION: Primary | ICD-10-CM

## 2025-05-05 PROCEDURE — 3044F HG A1C LEVEL LT 7.0%: CPT | Mod: CPTII,S$GLB,, | Performed by: FAMILY MEDICINE

## 2025-05-05 PROCEDURE — 99213 OFFICE O/P EST LOW 20 MIN: CPT | Mod: S$GLB,,, | Performed by: FAMILY MEDICINE

## 2025-05-05 PROCEDURE — 1159F MED LIST DOCD IN RCRD: CPT | Mod: CPTII,S$GLB,, | Performed by: FAMILY MEDICINE

## 2025-05-05 PROCEDURE — 4010F ACE/ARB THERAPY RXD/TAKEN: CPT | Mod: CPTII,S$GLB,, | Performed by: FAMILY MEDICINE

## 2025-05-05 PROCEDURE — 99999 PR PBB SHADOW E&M-EST. PATIENT-LVL IV: CPT | Mod: PBBFAC,,, | Performed by: FAMILY MEDICINE

## 2025-05-05 PROCEDURE — 3077F SYST BP >= 140 MM HG: CPT | Mod: CPTII,S$GLB,, | Performed by: FAMILY MEDICINE

## 2025-05-05 PROCEDURE — 3008F BODY MASS INDEX DOCD: CPT | Mod: CPTII,S$GLB,, | Performed by: FAMILY MEDICINE

## 2025-05-05 PROCEDURE — 3078F DIAST BP <80 MM HG: CPT | Mod: CPTII,S$GLB,, | Performed by: FAMILY MEDICINE

## 2025-05-05 PROCEDURE — 1160F RVW MEDS BY RX/DR IN RCRD: CPT | Mod: CPTII,S$GLB,, | Performed by: FAMILY MEDICINE

## 2025-05-05 RX ORDER — AMOXICILLIN AND CLAVULANATE POTASSIUM 875; 125 MG/1; MG/1
1 TABLET, FILM COATED ORAL EVERY 12 HOURS
Qty: 20 TABLET | Refills: 0 | Status: SHIPPED | OUTPATIENT
Start: 2025-05-05 | End: 2025-05-15

## 2025-05-05 NOTE — PROGRESS NOTES
Subjective:       Patient ID: Vera De Leon is a 50 y.o. female.    Chief Complaint: possible sinus infection      Here today for an acute visit.  She is here today complaining of a possible sinus infection. Started with URI symptoms last Tuesday (4/29), mild symptoms.  Currently with pressure behind her eyes, tooth pain, yellowish/green sputum.  Significant drainage.  She is using motrin, zyrtec and Flonase.      Review of Systems   Constitutional:  Negative for activity change and appetite change.   Respiratory:  Negative for shortness of breath and wheezing.    Cardiovascular:  Negative for chest pain and palpitations.   Gastrointestinal:  Negative for abdominal pain, constipation, diarrhea and nausea.   Neurological:  Negative for dizziness, syncope, light-headedness and headaches.       Objective:      Vitals:    05/05/25 1017   BP: (!) 142/64   Pulse: 72   Temp: 97.5 °F (36.4 °C)   SpO2: 99%   Weight: 83.3 kg (183 lb 10.3 oz)   Height: 5' (1.524 m)      Physical Exam  Constitutional:       Appearance: Normal appearance.   HENT:      Head: Normocephalic and atraumatic.      Right Ear: Tympanic membrane normal.      Left Ear: Tympanic membrane normal.      Nose: Congestion present.      Right Sinus: Maxillary sinus tenderness present.      Left Sinus: Maxillary sinus tenderness present.      Mouth/Throat:      Pharynx: No oropharyngeal exudate or posterior oropharyngeal erythema.   Cardiovascular:      Rate and Rhythm: Normal rate and regular rhythm.   Pulmonary:      Effort: Pulmonary effort is normal. No respiratory distress.      Breath sounds: Normal breath sounds. No stridor. No wheezing.   Lymphadenopathy:      Cervical: No cervical adenopathy.   Neurological:      Mental Status: She is alert.            Assessment:       1. Acute non-recurrent sinusitis, unspecified location        Plan:       Acute non-recurrent sinusitis, unspecified location  -     amoxicillin-clavulanate 875-125mg (AUGMENTIN)  875-125 mg per tablet; Take 1 tablet by mouth every 12 (twelve) hours. for 10 days  Dispense: 20 tablet; Refill: 0    Increase Fluid intake.  Start Mucinex-D  Start Flonase  Take multiple hot showers per day  Consider nasal irrigation.       Medication List with Changes/Refills   New Medications    AMOXICILLIN-CLAVULANATE 875-125MG (AUGMENTIN) 875-125 MG PER TABLET    Take 1 tablet by mouth every 12 (twelve) hours. for 10 days   Current Medications    ALBUTEROL (PROAIR HFA) 90 MCG/ACTUATION INHALER    Inhale 2 puffs into the lungs every 6 (six) hours as needed for Wheezing. Rescue    DIPH,PERTUSS,ACEL,TET-VAC,(ADACEL,TDAP ADOLESN/ADULT,PF,)2 LF-(2.5-5-3-5 MCG)-5LF/0.5 ML INJECTION    Inject into the muscle.    ESTRADIOL (IMVEXXY MAINTENANCE PACK) 10 MCG INST    Place 10 mcg vaginally twice a week.    LOSARTAN (COZAAR) 50 MG TABLET    Take 1 tablet (50 mg total) by mouth once daily.    NORETHINDRONE (AYGESTIN) 5 MG TAB    TAKE ONE TABLET BY MOUTH DAILY FOR 21 DAYS, STOP FOR 7 DAYS, THEN START TAKING AGAIN FOR 21 DAYS    ROSUVASTATIN (CRESTOR) 20 MG TABLET    Take 1 tablet (20 mg total) by mouth once daily.   Discontinued Medications    FLUTICASONE-SALMETEROL DISKUS INHALER 100-50 MCG    Inhale 1 puff into the lungs 2 (two) times daily. Controller

## 2025-05-14 ENCOUNTER — PATIENT MESSAGE (OUTPATIENT)
Dept: ORTHOPEDICS | Facility: CLINIC | Age: 50
End: 2025-05-14
Payer: COMMERCIAL

## 2025-05-19 ENCOUNTER — PATIENT MESSAGE (OUTPATIENT)
Dept: ORTHOPEDICS | Facility: CLINIC | Age: 50
End: 2025-05-19
Payer: COMMERCIAL

## 2025-05-19 ENCOUNTER — LAB VISIT (OUTPATIENT)
Dept: LAB | Facility: HOSPITAL | Age: 50
End: 2025-05-19
Attending: ORTHOPAEDIC SURGERY
Payer: COMMERCIAL

## 2025-05-19 DIAGNOSIS — G56.02 CARPAL TUNNEL SYNDROME ON LEFT: ICD-10-CM

## 2025-05-19 LAB
ANION GAP (OHS): 10 MMOL/L (ref 8–16)
BUN SERPL-MCNC: 14 MG/DL (ref 6–20)
CALCIUM SERPL-MCNC: 9.2 MG/DL (ref 8.7–10.5)
CHLORIDE SERPL-SCNC: 109 MMOL/L (ref 95–110)
CO2 SERPL-SCNC: 22 MMOL/L (ref 23–29)
CREAT SERPL-MCNC: 0.8 MG/DL (ref 0.5–1.4)
GFR SERPLBLD CREATININE-BSD FMLA CKD-EPI: >60 ML/MIN/1.73/M2
GLUCOSE SERPL-MCNC: 98 MG/DL (ref 70–110)
HGB BLD-MCNC: 14.6 GM/DL (ref 12–16)
POTASSIUM SERPL-SCNC: 4.4 MMOL/L (ref 3.5–5.1)
SODIUM SERPL-SCNC: 141 MMOL/L (ref 136–145)

## 2025-05-19 PROCEDURE — 80048 BASIC METABOLIC PNL TOTAL CA: CPT

## 2025-05-19 PROCEDURE — 36415 COLL VENOUS BLD VENIPUNCTURE: CPT | Mod: PO

## 2025-05-19 PROCEDURE — 85018 HEMOGLOBIN: CPT

## 2025-05-22 PROBLEM — G56.02 CARPAL TUNNEL SYNDROME ON LEFT: Status: ACTIVE | Noted: 2025-05-22

## 2025-05-22 PROBLEM — M65.332 TRIGGER FINGER, LEFT MIDDLE FINGER: Status: ACTIVE | Noted: 2025-05-22

## 2025-05-30 ENCOUNTER — PATIENT MESSAGE (OUTPATIENT)
Dept: ORTHOPEDICS | Facility: CLINIC | Age: 50
End: 2025-05-30
Payer: COMMERCIAL

## 2025-06-06 ENCOUNTER — OFFICE VISIT (OUTPATIENT)
Dept: ORTHOPEDICS | Facility: CLINIC | Age: 50
End: 2025-06-06
Payer: COMMERCIAL

## 2025-06-06 VITALS — HEIGHT: 60 IN | WEIGHT: 182.75 LBS | BODY MASS INDEX: 35.88 KG/M2

## 2025-06-06 DIAGNOSIS — G56.02 CARPAL TUNNEL SYNDROME ON LEFT: Primary | ICD-10-CM

## 2025-06-06 DIAGNOSIS — M65.332 TRIGGER FINGER, LEFT MIDDLE FINGER: ICD-10-CM

## 2025-06-06 PROCEDURE — 99999 PR PBB SHADOW E&M-EST. PATIENT-LVL III: CPT | Mod: PBBFAC,,, | Performed by: ORTHOPAEDIC SURGERY

## 2025-06-24 ENCOUNTER — TELEPHONE (OUTPATIENT)
Dept: OBSTETRICS AND GYNECOLOGY | Facility: CLINIC | Age: 50
End: 2025-06-24
Payer: COMMERCIAL

## 2025-06-24 NOTE — TELEPHONE ENCOUNTER
Called patient in reference to her message. Patient c/o hot flashes , 2 frozen shoulder and lack of sleep. Informed patient that her message would be given to Dr Cárdenas for review. Patient said thanks.

## 2025-06-27 ENCOUNTER — OFFICE VISIT (OUTPATIENT)
Dept: ORTHOPEDICS | Facility: CLINIC | Age: 50
End: 2025-06-27
Payer: COMMERCIAL

## 2025-06-27 DIAGNOSIS — Z98.890 S/P TRIGGER FINGER RELEASE: ICD-10-CM

## 2025-06-27 DIAGNOSIS — Z98.890 S/P CARPAL TUNNEL RELEASE: Primary | ICD-10-CM

## 2025-06-27 PROCEDURE — 99999 PR PBB SHADOW E&M-EST. PATIENT-LVL III: CPT | Mod: PBBFAC,,, | Performed by: PHYSICIAN ASSISTANT

## 2025-06-27 NOTE — PROGRESS NOTES
Vera De Leon is a 50 y.o. female who presents to clinic for follow up status post left carpal tunnel release and left middle finger trigger release.  She is about 5 weeks status post surgery.  States overall she is doing well.  States he has recently begun gentle scar massage.  States her symptoms have resolved since his surgery.  States that has that has some mild soreness/hypersensitivity over the surgical sites.  Denies any acute injuries.  Denies any other complaints this time.    Physical Exam:  Examination of the left hand/wrist reveals well-healed surgical sites.  Presence of minimal edema of the surgical sites.  Presence of scar tissue formation noted around the surgical sites.  No erythema, ecchymosis, or skin breakdown.  5/5 /intrinsic strength.  Normal sensation in the radial, ulnar, median nerve distributions.  Capillary refill is than 2 seconds.    Radiology:  None.    Assessment:  Status post left carpal tunnel release and left middle finger trigger release    Plan:  1. I discussed with Vera De Leon that he has progressed very well in the postoperative course we discussed the best course of action this time is to continue with the gentle scar massage and gradually return to normal activity as tolerated no restrictions.  She verbally agreed with the treatment plan     2.  I would like him follow up in clinic on a PRN basis.  She was instructed to contact the clinic for any problems or concerns in the interim.

## 2025-06-30 ENCOUNTER — OFFICE VISIT (OUTPATIENT)
Dept: ORTHOPEDICS | Facility: CLINIC | Age: 50
End: 2025-06-30
Payer: COMMERCIAL

## 2025-06-30 DIAGNOSIS — M67.912 TENDINOPATHY OF LEFT ROTATOR CUFF: Primary | ICD-10-CM

## 2025-06-30 PROCEDURE — 1159F MED LIST DOCD IN RCRD: CPT | Mod: CPTII,S$GLB,, | Performed by: ORTHOPAEDIC SURGERY

## 2025-06-30 PROCEDURE — 99213 OFFICE O/P EST LOW 20 MIN: CPT | Mod: 24,25,S$GLB, | Performed by: ORTHOPAEDIC SURGERY

## 2025-06-30 PROCEDURE — 99999 PR PBB SHADOW E&M-EST. PATIENT-LVL III: CPT | Mod: PBBFAC,,, | Performed by: ORTHOPAEDIC SURGERY

## 2025-06-30 PROCEDURE — 20610 DRAIN/INJ JOINT/BURSA W/O US: CPT | Mod: 79,LT,S$GLB, | Performed by: ORTHOPAEDIC SURGERY

## 2025-06-30 PROCEDURE — 4010F ACE/ARB THERAPY RXD/TAKEN: CPT | Mod: CPTII,S$GLB,, | Performed by: ORTHOPAEDIC SURGERY

## 2025-06-30 PROCEDURE — 3044F HG A1C LEVEL LT 7.0%: CPT | Mod: CPTII,S$GLB,, | Performed by: ORTHOPAEDIC SURGERY

## 2025-06-30 RX ORDER — TRIAMCINOLONE ACETONIDE 40 MG/ML
40 INJECTION, SUSPENSION INTRA-ARTICULAR; INTRAMUSCULAR
Status: DISCONTINUED | OUTPATIENT
Start: 2025-06-30 | End: 2025-06-30 | Stop reason: HOSPADM

## 2025-06-30 RX ADMIN — TRIAMCINOLONE ACETONIDE 40 MG: 40 INJECTION, SUSPENSION INTRA-ARTICULAR; INTRAMUSCULAR at 10:06

## 2025-06-30 NOTE — PROGRESS NOTES
Ms De Leon returns to clinic today.  She has a history of left shoulder pain.  Over the last week it has gotten drastically worse.  He is now having significant limitation of motion.  She also has some weakness.  She does not remember any injury.  She is here today for further evaluation     Physical exam: Examination of the left shoulder reveals that there is no edema.  There are no major skin changes.  Palpation produces only mild tenderness over the anterior shoulder.  Active range of motion is elevation of 80° external rotation of 40° and internal rotation is to the iliac crest.  Passively I can elevator to 150° and externally rotate her to 70°.  She does have 4/5 strength of the supraspinatus and infraspinatus.  He has pain with impingement signs.      Assessment: Left shoulder rotator cuff tendinitis versus tear     Plan:     1. After consent was obtained injection was placed to the left shoulder subacromial space     2.  Will set her up with physical therapy to begin rotator cuff tendinopathy protocol     Three.  She will follow up in 6 weeks for repeat evaluation

## 2025-06-30 NOTE — PROCEDURES
Large Joint Aspiration/Injection: L subacromial bursa    Date/Time: 6/30/2025 10:20 AM    Performed by: Jorge Grajeda MD  Authorized by: Jorge Grajeda MD    Consent Done?:  Yes (Verbal)  Indications:  Pain  Site marked: the procedure site was marked    Timeout: prior to procedure the correct patient, procedure, and site was verified    Prep: patient was prepped and draped in usual sterile fashion      Details:  Needle Size:  21 G  Approach:  Posterior  Location:  Shoulder  Site:  L subacromial bursa  Medications:  40 mg triamcinolone acetonide 40 mg/mL  Patient tolerance:  Patient tolerated the procedure well with no immediate complications

## 2025-07-05 ENCOUNTER — PATIENT MESSAGE (OUTPATIENT)
Facility: CLINIC | Age: 50
End: 2025-07-05
Payer: COMMERCIAL

## 2025-07-08 ENCOUNTER — CLINICAL SUPPORT (OUTPATIENT)
Dept: REHABILITATION | Facility: HOSPITAL | Age: 50
End: 2025-07-08
Attending: ORTHOPAEDIC SURGERY
Payer: COMMERCIAL

## 2025-07-08 DIAGNOSIS — R29.898 IMPAIRED STRENGTH OF SHOULDER MUSCLES: ICD-10-CM

## 2025-07-08 DIAGNOSIS — N95.1 PERIMENOPAUSAL SYMPTOMS: Primary | ICD-10-CM

## 2025-07-08 DIAGNOSIS — M25.612 DECREASED RANGE OF MOTION OF LEFT SHOULDER: Primary | ICD-10-CM

## 2025-07-08 PROCEDURE — 97112 NEUROMUSCULAR REEDUCATION: CPT | Mod: PO | Performed by: PHYSICAL THERAPIST

## 2025-07-08 PROCEDURE — 97161 PT EVAL LOW COMPLEX 20 MIN: CPT | Mod: PO | Performed by: PHYSICAL THERAPIST

## 2025-07-08 RX ORDER — PROGESTERONE 100 MG/1
100 CAPSULE ORAL NIGHTLY
Qty: 30 CAPSULE | Refills: 6 | Status: SHIPPED | OUTPATIENT
Start: 2025-07-08

## 2025-07-08 NOTE — PROGRESS NOTES
Outpatient Rehab    Physical Therapy Evaluation    Patient Name: Vera De Leon  MRN: [id number]  YOB: 1975  Encounter Date: 7/8/2025    Therapy Diagnosis:   Encounter Diagnoses   Name Primary?    Decreased range of motion of left shoulder Yes    Impaired strength of shoulder muscles      Physician: Jorge Grajeda MD    Physician Orders: Eval and Treat  Medical Diagnosis: Tendinopathy of left rotator cuff  Surgical Diagnosis: Not applicable for this Episode   Surgical Date: Not applicable for this Episode  Days Since Last Surgery: Not applicable for this Episode    Visit # / Visits Authorized:  1 / 1  Insurance Authorization Period: 6/30/2025 to 6/30/2026  Date of Evaluation: 7/8/2025  Plan of Care Certification: 7/8/2025 to 8/22/2025     Time In: 0800   Time Out: 0845  Total Time (in minutes): 45   Total Billable Time (in minutes): 45    Intake Outcome Measure for FOTO Survey    Therapist reviewed FOTO scores for Vera De Leon on 7/8/2025.   FOTO report - see Media section or FOTO account episode details.     Intake Score: 52 (Goal: 70)%    Precautions:      Please begin therapy to the left shoulder rotator cuff tendinopathy protocol Frequency 2-3 times per week Duration: 4-6 weeks Diagnosis: Left shoulder rotator cuff tendinopathy     Nonoperative-Treatment-of-Rotator-Cuff-Tendinopathy-PT-Guidelines.pdf     Subjective   History of Present Illness  Vera is a 50 y.o. female who reports to physical therapy with a chief concern of L shoulder pain.     The patient reports a medical diagnosis of Tendinopathy of left rotator cuff.    Diagnostic tests related to this condition: None.        Dominant Hand: Right  History of Present Condition/Illness: Vera reports having CTS of right side and may have over used her Left arm. She has been having Left shoulder pain since January which did not cause her sleep troubles. However, in the last month, this is getting severe and waking her. She  has a h/o Left hand sx for CTS and disuse during that time. She is right hand dominant. She does not work. Since received an injection on  (Site:  L subacromial bursa Medications:  40 mg triamcinolone acetonide 40 mg/mL). She is no longer having trouble donning and doffing clothing.    Pain     Patient reports a current pain level of 3/10. Pain at best is reported as 0/10. Pain at worst is reported as 10/10.   Location: L ant shoulder pain  Clinical Progression (since onset): Stable  Pain Qualities: Aching, Discomfort, Dull  Pain-Relieving Factors: Change in position  Pain-Aggravating Factors: Holding objects, Lifting, Movement         Review of Systems  Patient denies: Cancer History, Cardiac History, and Diabetes        Treatment History  Treatments  Previously Received Treatments: Yes  Previous Treatments: Medications - over-the-counter, Other (Comment)  Other Previous Treatments: subacromial injection  Currently Receiving Treatments: No    Employment  Patient does not report that: Does the patient's condition impact their ability to work?  Employment Status: Not employed        Past Medical History/Physical Systems Review:   Vera De Leon  has a past medical history of Allergy, Asthma, Chronic kidney disease, Hyperlipidemia, Hypertension, and Sleep apnea.    Vera De Leon  has a past surgical history that includes Breast reduction (Bilateral, );  section (1999; 3/2005); Tubal ligation (3/2005); Total Reduction Mammoplasty; Carpal tunnel release (Right, 2025); Carpal tunnel release (Left, 2025); and Trigger finger release (Left, 2025).    Vera has a current medication list which includes the following prescription(s): albuterol, adacel(tdap adolesn/adult)(pf), imvexxy maintenance pack, losartan, norethindrone, progesterone, and rosuvastatin.    Review of patient's allergies indicates:   Allergen Reactions    Latex Rash and Shortness Of Breath    Adhesive Blisters     Milk containing products (dairy)     Propofol Swelling    Toradol  [ketorolac] Swelling        Objective   Posture  Patient presents with a Forward head position.     Shoulders are Rounded.             Shoulder Palpation             + pain at pec major, LHB, infraspinatus insertion/distal t., deltoid insertion.        Shoulder Range of Motion  Right Shoulder   Active (deg) Passive (deg) Pain   Flexion 180       Extension 60       Scaption         ABduction 170       ADduction         Horizontal ABduction         Horizontal ADduction         External Rotation (Shoulder ABducted 0 degrees)         External Rotation (Shoulder ABducted 45 degrees)         External Rotation (Shoulder ABducted 90 degrees)         Internal Rotation (Shoulder ABducted 0 degrees)         Internal Rotation (Shoulder ABducted 45 degrees)         Internal Rotation (Shoulder ABducted 90 degrees)           Left Shoulder   Active (deg) Passive (deg) Pain   Flexion 150       Extension 55   Yes   Scaption         ABduction 130       ADduction         Horizontal ABduction         Horizontal ADduction         External Rotation (Shoulder ABducted 0 degrees)         External Rotation (Shoulder ABducted 45 degrees)         External Rotation (Shoulder ABducted 90 degrees)         Internal Rotation (Shoulder ABducted 0 degrees)         Internal Rotation (Shoulder ABducted 45 degrees)         Internal Rotation (Shoulder ABducted 90 degrees)             Shoulder, Elbow, or Forearm Range of Motion Details: Left shoulder INTERNAL ROTATION AROM to thoraclumbar junction (improved from MD evaluation and injection). Right shoulder INTERNAL ROTATION AROM to thoraclumbar junction. Overhead reach to T3/4 bilaterally. + pain at end range with Left shoulder ROMs.         Shoulder Strength - Planes of Motion   Right Strength Right Pain Left Strength Left  Pain   Flexion 4   4-     Extension 5   4     ABduction 5   4     ADduction           Horizontal ABduction            Horizontal ADduction           Internal Rotation 0°           Internal Rotation 90° 5   4-     External Rotation 0°           External Rotation 90° 5   4                   Shoulder Special Tests  Shoulder Stability Tests  Negative: Left Anterior Load and Shift, Left Apprehension, Right Posterior Load and Shift, and Left Sulcus Sign  Negative: Left Bronx's  Rotator Cuff Tests  Negative: Left Drop Arm and Left Empty Can  Negative: Left Lift Off  Impingement Tests  Positive: Left Snyder-Papo and Left Painful Arc  Negative: Left Cross Body ADduction  Acromioclavicular Tests  Negative: Left Passive Horizontal ADduction       HK slightly pos Left sided.         Treatment:  Balance/Neuromuscular Re-Education  NMR 1: HEP: see handout, AAROM and pendulums    Time Entry(in minutes):  PT Evaluation (Low) Time Entry: 45  Neuromuscular Re-Education Time Entry: 10    Assessment & Plan   Assessment  Vera presents with a condition of Low complexity.   Presentation of Symptoms: Stable  Will Comorbidities Impact Care: No       Functional Limitations: Activity tolerance, Carrying objects, Completing self-care activities, Pain when reaching, Pain with ADLs/IADLs, Participating in leisure activities, Range of motion  Impairments: Impaired physical strength, Pain with functional activity, Abnormal or restricted range of motion  Personal Factors Affecting Prognosis: Pain    Patient Goal for Therapy (PT): Pt would like to improve ROM and strength of the L shoulder to improve reaching abilities.  Prognosis: Good  Assessment Details: Vera presents with Left Shoulder pain, impaired ROM, strength, and ADLs consistent with Left RTC tendinitis/osis more than impingement. She also has postural syndrome. Vera will benefit from skilled PT to address above deficits. Progress HEP from AAROM to AROM after posterior shoulder strength gained. Follow tendinopathy protocol.    Plan  From a physical therapy perspective, the patient would  benefit from: Skilled Rehab Services    Planned therapy interventions include: Therapeutic exercise, Therapeutic activities, Neuromuscular re-education, and Manual therapy.    Planned modalities to include: Cryotherapy (cold pack), Thermotherapy (hot pack), and Electrical stimulation - passive/unattended.        Visit Frequency: 3 times Per Week for 6 Weeks.  Other/tapered frequency details: Dec to 2x/week prn    This plan was discussed with Patient.   Discussion participants: Agreed Upon Plan of Care  Plan details: Patient may be seen by a PHYSICAL THERAPIST ASSISTANT as part of the tx team.         The patient's spiritual, cultural, and educational needs were considered, and the patient is agreeable to the plan of care and goals.     Education  Education was done with Patient. The patient's learning style includes Demonstration, Listening, and Pictures/video. The patient Verbalizes understanding and Demonstrates understanding.               Goals:   Active       Functional outcome       Patient will show a significant change in FOTO-70 patient-reported outcome tool to demonstrate subjective improvement (Progressing)       Start:  07/08/25    Expected End:  08/22/25            Patient stated goal: Pt would like to improve ROM and strength of the L shoulder to improve reaching abilities.  (Progressing)       Start:  07/08/25    Expected End:  08/22/25            Patient will demonstrate independence in home program for support of progression (Progressing)       Start:  07/08/25    Expected End:  08/22/25               Pain       Patient will report a 2 point reduction in pain while performing reaching. (Progressing)       Start:  07/08/25    Expected End:  08/01/25               Range of Motion       Patient will achieve left shoulder flexion of 180 degrees (Progressing)       Start:  07/08/25    Expected End:  08/01/25            Patient will achieve left shoulder abduction of 170 degrees (Progressing)       Start:   07/08/25    Expected End:  08/01/25               Strength       Patient will achieve left shoulder flexion strength of 4/5 (Progressing)       Start:  07/08/25    Expected End:  08/01/25            Patient will achieve left shoulder extension strength of 5/5 (Progressing)       Start:  07/08/25    Expected End:  08/01/25            Patient will achieve left shoulder abduction strength of 4+/5 (Progressing)       Start:  07/08/25    Expected End:  08/01/25            Patient will achieve left shoulder external rotation strength of 4-/5 in 90 degrees abd (Progressing)       Start:  07/08/25    Expected End:  08/01/25            Patient will achieve left shoulder internal rotation strength of 4+/5 in 90 degrees abd (Progressing)       Start:  07/08/25    Expected End:  08/01/25                Faye Em, PT, DPT

## 2025-07-08 NOTE — PATIENT INSTRUCTIONS
ALTERNATE HEAT AND ICE. OTC OR PRESCRIBED MEDS AS  RECOMMENDED. Avoid repetitive over head reaching or cross body, heavy lifting and twisting. Use help when.

## 2025-07-09 ENCOUNTER — CLINICAL SUPPORT (OUTPATIENT)
Dept: REHABILITATION | Facility: HOSPITAL | Age: 50
End: 2025-07-09
Payer: COMMERCIAL

## 2025-07-09 ENCOUNTER — LAB VISIT (OUTPATIENT)
Dept: LAB | Facility: HOSPITAL | Age: 50
End: 2025-07-09
Attending: OBSTETRICS & GYNECOLOGY
Payer: COMMERCIAL

## 2025-07-09 DIAGNOSIS — M25.612 DECREASED RANGE OF MOTION OF LEFT SHOULDER: Primary | ICD-10-CM

## 2025-07-09 DIAGNOSIS — R29.898 IMPAIRED STRENGTH OF SHOULDER MUSCLES: ICD-10-CM

## 2025-07-09 DIAGNOSIS — N95.1 PERIMENOPAUSAL SYMPTOMS: ICD-10-CM

## 2025-07-09 LAB
ESTRADIOL SERPL HS-MCNC: <10 PG/ML
FSH SERPL-ACNC: 40.93 MIU/ML

## 2025-07-09 PROCEDURE — 36415 COLL VENOUS BLD VENIPUNCTURE: CPT | Mod: PO

## 2025-07-09 PROCEDURE — 83001 ASSAY OF GONADOTROPIN (FSH): CPT

## 2025-07-09 PROCEDURE — 82670 ASSAY OF TOTAL ESTRADIOL: CPT

## 2025-07-09 PROCEDURE — 97112 NEUROMUSCULAR REEDUCATION: CPT | Mod: PO,CQ

## 2025-07-09 PROCEDURE — 97110 THERAPEUTIC EXERCISES: CPT | Mod: PO,CQ

## 2025-07-09 NOTE — PROGRESS NOTES
"Outpatient Rehab    Physical Therapy Visit    Patient Name: Vera De Leon  MRN: [id number]  YOB: 1975  Encounter Date: 7/9/2025    Therapy Diagnosis:   Encounter Diagnoses   Name Primary?    Decreased range of motion of left shoulder Yes    Impaired strength of shoulder muscles      Physician: Jorge Grajeda MD    Physician Orders: Eval and Treat  Medical Diagnosis: Tendinopathy of left rotator cuff  Surgical Diagnosis: Not applicable for this Episode   Surgical Date: Not applicable for this Episode  Days Since Last Surgery: Not applicable for this Episode    Visit # / Visits Authorized:  1 / 20  Insurance Authorization Period: 7/2/2025 to 12/31/2025  Date of Evaluation: 7/8/2025  Plan of Care Certification: 7/8/2025 to 8/22/2025      PT/PTA: PTA   Number of PTA visits since last PT visit:1  Time In: 0800   Time Out: 0855  Total Time (in minutes): 55   Total Billable Time (in minutes): 55    FOTO:  Intake Score: 52%  Survey Score 2:  %  Survey Score 3:  %    Precautions:       Subjective   Pt reprots mild L anterior shoulder pain levels upon arrival for first follow up visit. She has attempted HEP since initial evaluation.  Pain reported as 3/10.      Objective            Treatment:  Therapeutic Exercise  TE 1: Pulleys flex/abd x 3 minutes ea  TE 2: Pendulums A/P, lateral, and circles x 30 ea  TE 3: AAROM L shoulder flex/abd/ER 2 x 10 ea  TE 4: Pec stretch over towell roll x 3 min  Balance/Neuromuscular Re-Education  NMR 1: Scap retractions x 30 5" holds  NMR 2: Supine horizontal abd GTB 3 x 10  NMR 3: Supine diaganols GTB 2 x 10 ea  NMR 4: No Money GTB 3 x 10  NMR 5: Rows GTB 3 x 10  NMR 6: Shoulder ext GTB 3 x 10    Time Entry(in minutes):  Neuromuscular Re-Education Time Entry: 32  Therapeutic Exercise Time Entry: 23    Assessment & Plan   Assessment: Vera returned for her first follow up visit reporting mild L anterior shoulder pain levels. Treatment began with HEP review with " additional shoulder mobility and postural strengthening exercises. Treatment was tolerated well with no adverse effects. Verbal cueing was required throughout to slow pace of exercise to allow for proper form and increased muscle time under tension. Will continue to progress per pt's tolerance.       The patient will continue to benefit from skilled outpatient physical therapy in order to address the deficits listed in the problem list on the initial evaluation, provide patient and family education, and maximize the patients level of independence in the home and community environments.     The patient's spiritual, cultural, and educational needs were considered, and the patient is agreeable to the plan of care and goals.           Plan: Continued with POC    Goals:   Active       Functional outcome       Patient will show a significant change in FOTO-70 patient-reported outcome tool to demonstrate subjective improvement (Progressing)       Start:  07/08/25    Expected End:  08/22/25            Patient stated goal: Pt would like to improve ROM and strength of the L shoulder to improve reaching abilities.  (Progressing)       Start:  07/08/25    Expected End:  08/22/25            Patient will demonstrate independence in home program for support of progression (Progressing)       Start:  07/08/25    Expected End:  08/22/25               Pain       Patient will report a 2 point reduction in pain while performing reaching. (Progressing)       Start:  07/08/25    Expected End:  08/01/25               Range of Motion       Patient will achieve left shoulder flexion of 180 degrees (Progressing)       Start:  07/08/25    Expected End:  08/01/25            Patient will achieve left shoulder abduction of 170 degrees (Progressing)       Start:  07/08/25    Expected End:  08/01/25               Strength       Patient will achieve left shoulder flexion strength of 4/5 (Progressing)       Start:  07/08/25    Expected End:   08/01/25            Patient will achieve left shoulder extension strength of 5/5 (Progressing)       Start:  07/08/25    Expected End:  08/01/25            Patient will achieve left shoulder abduction strength of 4+/5 (Progressing)       Start:  07/08/25    Expected End:  08/01/25            Patient will achieve left shoulder external rotation strength of 4-/5 in 90 degrees abd (Progressing)       Start:  07/08/25    Expected End:  08/01/25            Patient will achieve left shoulder internal rotation strength of 4+/5 in 90 degrees abd (Progressing)       Start:  07/08/25    Expected End:  08/01/25                Conrad Houston, PTA

## 2025-07-11 ENCOUNTER — CLINICAL SUPPORT (OUTPATIENT)
Dept: REHABILITATION | Facility: HOSPITAL | Age: 50
End: 2025-07-11
Payer: COMMERCIAL

## 2025-07-11 DIAGNOSIS — R29.898 IMPAIRED STRENGTH OF SHOULDER MUSCLES: ICD-10-CM

## 2025-07-11 DIAGNOSIS — M25.612 DECREASED RANGE OF MOTION OF LEFT SHOULDER: Primary | ICD-10-CM

## 2025-07-11 PROCEDURE — 97112 NEUROMUSCULAR REEDUCATION: CPT | Mod: PO,CQ

## 2025-07-11 PROCEDURE — 97110 THERAPEUTIC EXERCISES: CPT | Mod: PO,CQ

## 2025-07-11 NOTE — PROGRESS NOTES
"Outpatient Rehab    Physical Therapy Visit    Patient Name: Vera De Leon  MRN: [id number]  YOB: 1975  Encounter Date: 7/11/2025    Therapy Diagnosis:   Encounter Diagnoses   Name Primary?    Decreased range of motion of left shoulder Yes    Impaired strength of shoulder muscles      Physician: Jorge Grajeda MD    Physician Orders: Eval and Treat  Medical Diagnosis: Tendinopathy of left rotator cuff  Surgical Diagnosis: Not applicable for this Episode   Surgical Date: Not applicable for this Episode  Days Since Last Surgery: Not applicable for this Episode    Visit # / Visits Authorized:  2 / 20  Insurance Authorization Period: 7/2/2025 to 12/31/2025  Date of Evaluation: 7/8/2025  Plan of Care Certification: 7/8/2025 to 8/22/2025      PT/PTA: PTA   Number of PTA visits since last PT visit:2  Time In: 0855   Time Out: 0950  Total Time (in minutes): 55   Total Billable Time (in minutes): 55    FOTO:  Intake Score: 52%  Survey Score 2:  %  Survey Score 3:  %    Precautions:       Subjective   Pt reports only min L anterior shoulder pain levels and notes no increase in muscular soreness following first follow up visit.  Pain reported as 1/10.      Objective            Treatment:  Therapeutic Exercise  TE 1: Pulleys flex/abd x 3 minutes ea  TE 2: Pendulums A/P, lateral, and circles x 30 ea  TE 3: AAROM L shoulder flex/abd/ER 2 x 10 ea  TE 4: Pec stretch over towell roll x 3 min  TE 5: UBE x 6 minutes (forward/backward)  Balance/Neuromuscular Re-Education  NMR 1: Scap retractions x 30 5" holds  NMR 2: Supine horizontal abd GTB 3 x 10  NMR 3: Supine diaganols GTB 2 x 10 ea  NMR 4: No Money GTB 3 x 10  NMR 5: Rows GTB 3 x 10  NMR 6: Shoulder ext GTB 3 x 10  NMR 7: SL ER 3 x 10  NMR 8: Farmer's carry 20# x 3 laps on turf    Time Entry(in minutes):  Neuromuscular Re-Education Time Entry: 32  Therapeutic Exercise Time Entry: 23    Assessment & Plan   Assessment: Vera returned reporting only min L " anterior shoulder discomfort. Continuation and progression of shoulder mobility, RTC  and postural strengthening exercises. SL ER, farmer's carry, and UBE were all introduced to further challenge postural and posterior shoulder strengthening/endurance. Exercise progressions were tolerated well with no adverse effects. Will continue to progress per pt's tolerance.       The patient will continue to benefit from skilled outpatient physical therapy in order to address the deficits listed in the problem list on the initial evaluation, provide patient and family education, and maximize the patients level of independence in the home and community environments.     The patient's spiritual, cultural, and educational needs were considered, and the patient is agreeable to the plan of care and goals.           Plan: Continued with POC    Goals:   Active       Functional outcome       Patient will show a significant change in FOTO-70 patient-reported outcome tool to demonstrate subjective improvement (Progressing)       Start:  07/08/25    Expected End:  08/22/25            Patient stated goal: Pt would like to improve ROM and strength of the L shoulder to improve reaching abilities.  (Progressing)       Start:  07/08/25    Expected End:  08/22/25            Patient will demonstrate independence in home program for support of progression (Progressing)       Start:  07/08/25    Expected End:  08/22/25               Pain       Patient will report a 2 point reduction in pain while performing reaching. (Progressing)       Start:  07/08/25    Expected End:  08/01/25               Range of Motion       Patient will achieve left shoulder flexion of 180 degrees (Progressing)       Start:  07/08/25    Expected End:  08/01/25            Patient will achieve left shoulder abduction of 170 degrees (Progressing)       Start:  07/08/25    Expected End:  08/01/25               Strength       Patient will achieve left shoulder flexion  strength of 4/5 (Progressing)       Start:  07/08/25    Expected End:  08/01/25            Patient will achieve left shoulder extension strength of 5/5 (Progressing)       Start:  07/08/25    Expected End:  08/01/25            Patient will achieve left shoulder abduction strength of 4+/5 (Progressing)       Start:  07/08/25    Expected End:  08/01/25            Patient will achieve left shoulder external rotation strength of 4-/5 in 90 degrees abd (Progressing)       Start:  07/08/25    Expected End:  08/01/25            Patient will achieve left shoulder internal rotation strength of 4+/5 in 90 degrees abd (Progressing)       Start:  07/08/25    Expected End:  08/01/25                Conrad Houston, PTA

## 2025-07-14 ENCOUNTER — CLINICAL SUPPORT (OUTPATIENT)
Dept: REHABILITATION | Facility: HOSPITAL | Age: 50
End: 2025-07-14
Payer: COMMERCIAL

## 2025-07-14 DIAGNOSIS — M25.612 DECREASED RANGE OF MOTION OF LEFT SHOULDER: Primary | ICD-10-CM

## 2025-07-14 DIAGNOSIS — R29.898 IMPAIRED STRENGTH OF SHOULDER MUSCLES: ICD-10-CM

## 2025-07-14 PROCEDURE — 97112 NEUROMUSCULAR REEDUCATION: CPT | Mod: PO,CQ

## 2025-07-14 PROCEDURE — 97110 THERAPEUTIC EXERCISES: CPT | Mod: PO,CQ

## 2025-07-14 PROCEDURE — 97140 MANUAL THERAPY 1/> REGIONS: CPT | Mod: PO,CQ

## 2025-07-14 NOTE — PROGRESS NOTES
"Outpatient Rehab    Physical Therapy Visit    Patient Name: Vera De Leon  MRN: [id number]  YOB: 1975  Encounter Date: 7/14/2025    Therapy Diagnosis:   Encounter Diagnoses   Name Primary?    Decreased range of motion of left shoulder Yes    Impaired strength of shoulder muscles      Physician: Jorge Grajeda MD    Physician Orders: Eval and Treat  Medical Diagnosis: Tendinopathy of left rotator cuff  Surgical Diagnosis: Not applicable for this Episode   Surgical Date: Not applicable for this Episode  Days Since Last Surgery: Not applicable for this Episode    Visit # / Visits Authorized:  3 / 20  Insurance Authorization Period: 7/2/2025 to 12/31/2025  Date of Evaluation: 7/8/2025  Plan of Care Certification: 7/8/2025 to 8/22/2025      PT/PTA: PTA   Number of PTA visits since last PT visit:3  Time In: 0955   Time Out: 1050  Total Time (in minutes): 55   Total Billable Time (in minutes): 55    FOTO:  Intake Score: 52%  Survey Score 2:  %  Survey Score 3:  %    Precautions:       Subjective   Pt reports no L shoulder pain at rest. She tends to experience anterior shoulder pain with abduction motions.  Pain reported as 0/10.      Objective            Treatment:  Therapeutic Exercise  TE 1: Pulleys flex/abd x 3 minutes ea  TE 2: Pendulums A/P, lateral, and circles x 30 ea-NP  TE 3: AAROM L shoulder flex/abd/ER 2 x 10 ea  TE 4: Pec stretch over towell roll x 3 min  TE 5: UBE x 6 minutes (forward/backward)  Manual Therapy  MT 1: G/H distraction w/oscilations  MT 2: Posterior/inferior G/H joint mobs  MT 3: PROM L shoulder (flex/abd)  Balance/Neuromuscular Re-Education  NMR 1: Scap retractions x 30 5" holds  NMR 2: Supine horizontal abd GTB 3 x 10  NMR 3: Supine diaganols GTB 2 x 10 ea  NMR 4: No Money GTB 3 x 10  NMR 5: Rows GTB 3 x 10  NMR 6: Shoulder ext GTB 3 x 10  NMR 7: SL ER 3 x 10  NMR 8: Farmer's carry 20# x 3 laps on turf  NMR 9: Serratus wall slides x 20    Time Entry(in minutes):  Manual " Therapy Time Entry: 10  Neuromuscular Re-Education Time Entry: 30  Therapeutic Exercise Time Entry: 15    Assessment & Plan   Assessment: Vera returned without complaints of L shoulder at rest. Continuation and progression of L shoulder mobility, RTC and postural strengthening exercises were tolerated well. Manual G/H distraction and posterior/inferior mobilizations were performed today with pt noting decreased discomfort with abd motions following manual techniques. Will continue to progress per pt's tolerance.       The patient will continue to benefit from skilled outpatient physical therapy in order to address the deficits listed in the problem list on the initial evaluation, provide patient and family education, and maximize the patients level of independence in the home and community environments.     The patient's spiritual, cultural, and educational needs were considered, and the patient is agreeable to the plan of care and goals.           Plan: Continued with POC    Goals:   Active       Functional outcome       Patient will show a significant change in FOTO-70 patient-reported outcome tool to demonstrate subjective improvement (Progressing)       Start:  07/08/25    Expected End:  08/22/25            Patient stated goal: Pt would like to improve ROM and strength of the L shoulder to improve reaching abilities.  (Progressing)       Start:  07/08/25    Expected End:  08/22/25            Patient will demonstrate independence in home program for support of progression (Progressing)       Start:  07/08/25    Expected End:  08/22/25               Pain       Patient will report a 2 point reduction in pain while performing reaching. (Progressing)       Start:  07/08/25    Expected End:  08/01/25               Range of Motion       Patient will achieve left shoulder flexion of 180 degrees (Progressing)       Start:  07/08/25    Expected End:  08/01/25            Patient will achieve left shoulder abduction of  170 degrees (Progressing)       Start:  07/08/25    Expected End:  08/01/25               Strength       Patient will achieve left shoulder flexion strength of 4/5 (Progressing)       Start:  07/08/25    Expected End:  08/01/25            Patient will achieve left shoulder extension strength of 5/5 (Progressing)       Start:  07/08/25    Expected End:  08/01/25            Patient will achieve left shoulder abduction strength of 4+/5 (Progressing)       Start:  07/08/25    Expected End:  08/01/25            Patient will achieve left shoulder external rotation strength of 4-/5 in 90 degrees abd (Progressing)       Start:  07/08/25    Expected End:  08/01/25            Patient will achieve left shoulder internal rotation strength of 4+/5 in 90 degrees abd (Progressing)       Start:  07/08/25    Expected End:  08/01/25                Conrad Houston, PTA

## 2025-07-16 ENCOUNTER — CLINICAL SUPPORT (OUTPATIENT)
Dept: REHABILITATION | Facility: HOSPITAL | Age: 50
End: 2025-07-16
Payer: COMMERCIAL

## 2025-07-16 DIAGNOSIS — R29.898 IMPAIRED STRENGTH OF SHOULDER MUSCLES: ICD-10-CM

## 2025-07-16 DIAGNOSIS — M25.612 DECREASED RANGE OF MOTION OF LEFT SHOULDER: Primary | ICD-10-CM

## 2025-07-16 PROCEDURE — 97140 MANUAL THERAPY 1/> REGIONS: CPT | Mod: PO,CQ

## 2025-07-16 PROCEDURE — 97110 THERAPEUTIC EXERCISES: CPT | Mod: PO,CQ

## 2025-07-16 PROCEDURE — 97112 NEUROMUSCULAR REEDUCATION: CPT | Mod: PO,CQ

## 2025-07-16 NOTE — PROGRESS NOTES
"Outpatient Rehab    Physical Therapy Visit    Patient Name: Vera De Leon  MRN: [id number]  YOB: 1975  Encounter Date: 7/16/2025    Therapy Diagnosis:   Encounter Diagnoses   Name Primary?    Decreased range of motion of left shoulder Yes    Impaired strength of shoulder muscles      Physician: Jorge Grajeda MD    Physician Orders: Eval and Treat  Medical Diagnosis: Tendinopathy of left rotator cuff  Surgical Diagnosis: Not applicable for this Episode   Surgical Date: Not applicable for this Episode  Days Since Last Surgery: Not applicable for this Episode    Visit # / Visits Authorized:  4 / 20  Insurance Authorization Period: 7/2/2025 to 12/31/2025  Date of Evaluation: 7/8/2025  Plan of Care Certification: 7/8/2025 to 8/22/2025      PT/PTA: PTA   Number of PTA visits since last PT visit:4  Time In: 0956   Time Out: 1049  Total Time (in minutes): 53   Total Billable Time (in minutes): 53    FOTO:  Intake Score: 52%  Survey Score 2:  %  Survey Score 3:  %    Precautions:       Subjective   Pt reports only min L shoulder discomfort at this time. She notes improved comfort moving arm through abd motions since focus on G/H joint mobs performed during last visit.  Pain reported as 2/10.      Objective            Treatment:  Therapeutic Exercise  TE 1: Pulleys flex/abd x 3 minutes ea  TE 2: Pendulums A/P, lateral, and circles x 30 ea-NP  TE 3: AAROM L shoulder flex/abd/ER 3# dowel 2 x 10 ea  TE 4: Pec stretch over towell roll x 3 min  TE 5: UBE x 6 minutes (forward/backward)  Manual Therapy  MT 1: G/H distraction w/oscilations  MT 2: Posterior/inferior G/H joint mobs  MT 3: PROM L shoulder (flex/abd)  Balance/Neuromuscular Re-Education  NMR 1: Scap retractions x 30 5" holds  NMR 2: Supine horizontal abd GTB 3 x 10  NMR 3: Supine diaganols GTB 2 x 10 ea  NMR 4: No Money GTB 3 x 10  NMR 5: Rows GTB 3 x 10  NMR 6: Shoulder ext GTB 3 x 10  NMR 7: SL ER 2# 3 x 10  NMR 8: Farmer's carry 20# x 3 laps on " turf  NMR 9: Serratus wall slides x 20  NMR 10: ER walkout GTB x 15    Time Entry(in minutes):  Manual Therapy Time Entry: 8  Neuromuscular Re-Education Time Entry: 30  Therapeutic Exercise Time Entry: 15    Assessment & Plan   Assessment: Continuation and progression of L shoulder mobility, RTC and postural strengthening exercises were tolerated well. Manual G/H distraction and posterior/inferior mobilizations continued today with pt noting decreased discomfort with abd motions following manual techniques. Increased emphasis on ER strengthening by increasing resistance on SL ER and introducing band resisted ER walkouts.Will continue to progress per pt's tolerance.       The patient will continue to benefit from skilled outpatient physical therapy in order to address the deficits listed in the problem list on the initial evaluation, provide patient and family education, and maximize the patients level of independence in the home and community environments.     The patient's spiritual, cultural, and educational needs were considered, and the patient is agreeable to the plan of care and goals.           Plan: Continued with POC    Goals:   Active       Functional outcome       Patient will show a significant change in FOTO-70 patient-reported outcome tool to demonstrate subjective improvement (Progressing)       Start:  07/08/25    Expected End:  08/22/25            Patient stated goal: Pt would like to improve ROM and strength of the L shoulder to improve reaching abilities.  (Progressing)       Start:  07/08/25    Expected End:  08/22/25            Patient will demonstrate independence in home program for support of progression (Progressing)       Start:  07/08/25    Expected End:  08/22/25               Pain       Patient will report a 2 point reduction in pain while performing reaching. (Progressing)       Start:  07/08/25    Expected End:  08/01/25               Range of Motion       Patient will achieve left  shoulder flexion of 180 degrees (Progressing)       Start:  07/08/25    Expected End:  08/01/25            Patient will achieve left shoulder abduction of 170 degrees (Progressing)       Start:  07/08/25    Expected End:  08/01/25               Strength       Patient will achieve left shoulder flexion strength of 4/5 (Progressing)       Start:  07/08/25    Expected End:  08/01/25            Patient will achieve left shoulder extension strength of 5/5 (Progressing)       Start:  07/08/25    Expected End:  08/01/25            Patient will achieve left shoulder abduction strength of 4+/5 (Progressing)       Start:  07/08/25    Expected End:  08/01/25            Patient will achieve left shoulder external rotation strength of 4-/5 in 90 degrees abd (Progressing)       Start:  07/08/25    Expected End:  08/01/25            Patient will achieve left shoulder internal rotation strength of 4+/5 in 90 degrees abd (Progressing)       Start:  07/08/25    Expected End:  08/01/25                Conrad Houston, PTA

## 2025-07-17 ENCOUNTER — CLINICAL SUPPORT (OUTPATIENT)
Dept: REHABILITATION | Facility: HOSPITAL | Age: 50
End: 2025-07-17
Payer: COMMERCIAL

## 2025-07-17 DIAGNOSIS — M25.612 DECREASED RANGE OF MOTION OF LEFT SHOULDER: Primary | ICD-10-CM

## 2025-07-17 DIAGNOSIS — R29.898 IMPAIRED STRENGTH OF SHOULDER MUSCLES: ICD-10-CM

## 2025-07-17 PROCEDURE — 97110 THERAPEUTIC EXERCISES: CPT | Mod: PO,CQ

## 2025-07-17 PROCEDURE — 97112 NEUROMUSCULAR REEDUCATION: CPT | Mod: PO,CQ

## 2025-07-17 PROCEDURE — 97140 MANUAL THERAPY 1/> REGIONS: CPT | Mod: PO,CQ

## 2025-07-17 NOTE — PROGRESS NOTES
"Outpatient Rehab    Physical Therapy Visit    Patient Name: Vera De Leon  MRN: [id number]  YOB: 1975  Encounter Date: 7/17/2025    Therapy Diagnosis:   Encounter Diagnoses   Name Primary?    Decreased range of motion of left shoulder Yes    Impaired strength of shoulder muscles      Physician: Jorge Grajeda MD    Physician Orders: Eval and Treat  Medical Diagnosis: Tendinopathy of left rotator cuff  Surgical Diagnosis: Not applicable for this Episode   Surgical Date: Not applicable for this Episode  Days Since Last Surgery: Not applicable for this Episode    Visit # / Visits Authorized:  5 / 20  Insurance Authorization Period: 7/2/2025 to 12/31/2025  Date of Evaluation: 7/8/2025  Plan of Care Certification: 7/8/2025 to 8/22/2025      PT/PTA: PTA   Number of PTA visits since last PT visit:5  Time In: 1435   Time Out: 1530  Total Time (in minutes): 55   Total Billable Time (in minutes): 55    FOTO:  Intake Score: 52%  Survey Score 2: 60%  Survey Score 3:  %    Precautions:       Subjective   Pt reports continued min L shoulder discomfort currently. She notes only min increase in muscular soreness following exercise progresions made during last visit..  Pain reported as 2/10.      Objective            Treatment:  Therapeutic Exercise  TE 1: Pulleys flex/abd x 3 minutes ea  TE 2: Pendulums A/P, lateral, and circles x 30 ea-NP  TE 3: AAROM L shoulder flex/abd/ER 3# dowel 2 x 10 ea  TE 4: Pec stretch over towell roll x 3 min  TE 5: UBE x 6 minutes (forward/backward)  Manual Therapy  MT 1: G/H distraction w/oscilations  MT 2: Posterior/inferior G/H joint mobs  MT 3: PROM L shoulder (flex/abd)  Balance/Neuromuscular Re-Education  NMR 1: Scap retractions x 30 5" holds  NMR 2: Supine horizontal abd HOB elvated 30 degrees GTB 3 x 10  NMR 3: Supine diaganols HOB elvated 30 degrees GTB 2 x 10 ea  NMR 4: No Money GTB 3 x 10  NMR 5: Rows GTB 3 x 10  NMR 6: Shoulder ext GTB 3 x 10  NMR 7: SL ER 2# 3 x " 10  NMR 8: Farmer's carry 20# x 3 laps on turf  NMR 9: Serratus wall slides x 20  NMR 10: ER walkout GTB x 15    Time Entry(in minutes):  Manual Therapy Time Entry: 8  Neuromuscular Re-Education Time Entry: 32  Therapeutic Exercise Time Entry: 15    Assessment & Plan   Assessment: Continuation and progression of L shoulder mobility, RTC and postural strengthening exercises were tolerated well. Manual G/H distraction and posterior/inferior mobilizations continued today with pt noting improved shoulder mobility following manual techniques. HOB was elevated during supine horizontal abd and supine diagonals to further challenge scapular engagement. Will continue to progress per pt's tolerance.       The patient will continue to benefit from skilled outpatient physical therapy in order to address the deficits listed in the problem list on the initial evaluation, provide patient and family education, and maximize the patients level of independence in the home and community environments.     The patient's spiritual, cultural, and educational needs were considered, and the patient is agreeable to the plan of care and goals.           Plan: Continued with POC    Goals:   Active       Functional outcome       Patient will show a significant change in FOTO-70 patient-reported outcome tool to demonstrate subjective improvement (Progressing)       Start:  07/08/25    Expected End:  08/22/25            Patient stated goal: Pt would like to improve ROM and strength of the L shoulder to improve reaching abilities.  (Progressing)       Start:  07/08/25    Expected End:  08/22/25            Patient will demonstrate independence in home program for support of progression (Progressing)       Start:  07/08/25    Expected End:  08/22/25               Pain       Patient will report a 2 point reduction in pain while performing reaching. (Progressing)       Start:  07/08/25    Expected End:  08/01/25               Range of Motion        Patient will achieve left shoulder flexion of 180 degrees (Progressing)       Start:  07/08/25    Expected End:  08/01/25            Patient will achieve left shoulder abduction of 170 degrees (Progressing)       Start:  07/08/25    Expected End:  08/01/25               Strength       Patient will achieve left shoulder flexion strength of 4/5 (Progressing)       Start:  07/08/25    Expected End:  08/01/25            Patient will achieve left shoulder extension strength of 5/5 (Progressing)       Start:  07/08/25    Expected End:  08/01/25            Patient will achieve left shoulder abduction strength of 4+/5 (Progressing)       Start:  07/08/25    Expected End:  08/01/25            Patient will achieve left shoulder external rotation strength of 4-/5 in 90 degrees abd (Progressing)       Start:  07/08/25    Expected End:  08/01/25            Patient will achieve left shoulder internal rotation strength of 4+/5 in 90 degrees abd (Progressing)       Start:  07/08/25    Expected End:  08/01/25                Conrad Houston, PTA

## 2025-07-21 ENCOUNTER — CLINICAL SUPPORT (OUTPATIENT)
Dept: REHABILITATION | Facility: HOSPITAL | Age: 50
End: 2025-07-21
Payer: COMMERCIAL

## 2025-07-21 DIAGNOSIS — M25.612 DECREASED RANGE OF MOTION OF LEFT SHOULDER: Primary | ICD-10-CM

## 2025-07-21 DIAGNOSIS — R29.898 IMPAIRED STRENGTH OF SHOULDER MUSCLES: ICD-10-CM

## 2025-07-21 PROCEDURE — 97110 THERAPEUTIC EXERCISES: CPT | Mod: PO | Performed by: PHYSICAL THERAPIST

## 2025-07-21 PROCEDURE — 97112 NEUROMUSCULAR REEDUCATION: CPT | Mod: PO | Performed by: PHYSICAL THERAPIST

## 2025-07-21 NOTE — PROGRESS NOTES
Outpatient Rehab    Physical Therapy Progress Note    Patient Name: Vera De Leon  MRN: [id number]  YOB: 1975  Encounter Date: 7/21/2025    Therapy Diagnosis:   Encounter Diagnoses   Name Primary?    Decreased range of motion of left shoulder Yes    Impaired strength of shoulder muscles      Physician: Jorge Grajeda MD    Physician Orders: Eval and Treat  Medical Diagnosis: Tendinopathy of left rotator cuff  Surgical Diagnosis: Not applicable for this Episode   Surgical Date: Not applicable for this Episode  Days Since Last Surgery: Not applicable for this Episode    Visit # / Visits Authorized:  6 / 20  Insurance Authorization Period: 7/2/2025 to 12/31/2025  Date of Evaluation: 7/8/2025  Plan of Care Certification: 7/8/2025 to 8/22/2025      PT/PTA: PT   Number of PTA visits since last PT visit:5  Time In: 1000   Time Out: 1100  Total Time (in minutes): 60   Total Billable Time (in minutes): 55    FOTO:  Intake Score (%): 52  Survey Score 2 (%): 60  Survey Score 3 (%): Not applicable for this Episode    Precautions:     Please begin therapy to the left shoulder rotator cuff tendinopathy protocol Frequency 2-3 times per week Duration: 4-6 weeks Diagnosis: Left shoulder rotator cuff tendinopathy     Subjective   Vera reports low level pain continues present but overall getting better. She feels pain with removing covers while trying to sleep..  Pain reported as 2/10. L infraspinatus tendon insertions and top of muscle belly.    Objective          Posture  Patient presents with a Forward head position.     Shoulders are Rounded.   (Improving)     Shoulder Palpation    - pain at pec major, LHB, infraspinatus insertion/distal t., deltoid insertion.     Shoulder Range of Motion  Right Shoulder    Active (deg) Passive (deg) Pain   Flexion 180       Extension 60       Scaption         ABduction 170       ADduction         Horizontal ABduction         Horizontal ADduction         External  Rotation (Shoulder ABducted 0 degrees)         External Rotation (Shoulder ABducted 45 degrees)         External Rotation (Shoulder ABducted 90 degrees)         Internal Rotation (Shoulder ABducted 0 degrees)         Internal Rotation (Shoulder ABducted 45 degrees)         Internal Rotation (Shoulder ABducted 90 degrees)            Left Shoulder    Active (deg) Passive (deg) Pain   Flexion 180   Yes at end range   Extension 55   Yes   Scaption         ABduction 150   yes   ADduction         Horizontal ABduction         Horizontal ADduction         External Rotation (Shoulder ABducted 0 degrees)         External Rotation (Shoulder ABducted 45 degrees)         External Rotation (Shoulder ABducted 90 degrees)         Internal Rotation (Shoulder ABducted 0 degrees)         Internal Rotation (Shoulder ABducted 45 degrees)         Internal Rotation (Shoulder ABducted 90 degrees)            Np:  Shoulder, Elbow, or Forearm Range of Motion Details: Left shoulder INTERNAL ROTATION AROM to thoraclumbar junction (improved from MD evaluation and injection). Right shoulder INTERNAL ROTATION AROM to thoraclumbar junction. Overhead reach to T3/4 bilaterally. + pain at end range with Left shoulder ROMs.     Shoulder Strength - Planes of Motion-np    Right Strength Right Pain Left Strength Left  Pain   Flexion 4   4-     Extension 5   4     ABduction 5   4     ADduction           Horizontal ABduction           Horizontal ADduction           Internal Rotation 0°           Internal Rotation 90° 5   4-     External Rotation 0°           External Rotation 90° 5   4        Shoulder Special Tests  Shoulder Stability Tests  Negative: Left Anterior Load and Shift, Left Apprehension, Right Posterior Load and Shift, and Left Sulcus Sign  Negative: Left [place]'s  Rotator Cuff Tests  Negative: Left Drop Arm and Left Empty Can  Negative: Left Lift Off  Impingement Tests  Negative: Left Snyder-Papo and Left Painful Arc  Negative: Left Cross  "Body ADduction  Acromioclavicular Tests  Negative: Left Passive Horizontal ADduction    Treatment:  Therapeutic Exercise  TE 1: Pulleys flex/abd x 3 minutes ea  TE 3: AAROM L shoulder flex/abd/ER @ 90 deg 3# dowel 2 x 10 ea  TE 4: Pec stretch over towell roll x 3 min  TE 6: Sleeper stretch 3 x 30" (+ to HEP as well)  Manual Therapy  MT 2: Posterior/inferior G/H joint mobs  MT 4: Infraspinatus t and muscle STM and release  Balance/Neuromuscular Re-Education  NMR 1: Scap retractions x 30 5" holds  NMR 2: Supine horizontal abd HOB elvated 30 degrees GTB 3 x 10  NMR 3: Supine diaganols HOB elvated 30 degrees GTB 2 x 10 ea, supine punch 2 x 10 2#  NMR 4: No Money GTB 3 x 10  NMR 5: Rows GTB 3 x 10  NMR 6: Shoulder ext GTB 3 x 10  NMR 7: SL ER 2# 3 x 10  NMR 8: Farmer's carry 20# x 3 laps on turf  NMR 10: ER walkout GTB x 15    Time Entry(in minutes):  Manual Therapy Time Entry: 10  Neuromuscular Re-Education Time Entry: 25  Therapeutic Exercise Time Entry: 25    Assessment & Plan   Assessment: Vera continues doing well with less tender points and greater ROM. Strength not measured today, but will do so when next with PT. Patient continues with pain while removing covers in bed and shoulder Abduction AROM. Neg Snyder-Papo today. + sleeper stretch. + tenderness to infraspinatus insertion and belly. Patient met goal today. Additional HEP was given with gtb. Functional score improving. Patient encouraged to lift thumbs or palm up. Cont plan.  Evaluation/Treatment Tolerance: Patient tolerated treatment well    The patient will continue to benefit from skilled outpatient physical therapy in order to address the deficits listed in the problem list on the initial evaluation, provide patient and family education, and maximize the patients level of independence in the home and community environments.     The patient's spiritual, cultural, and educational needs were considered, and the patient is agreeable to the plan of " care and goals.           Plan: Continued with POC    Goals:   Active       Functional outcome       Patient will show a significant change in FOTO-70 patient-reported outcome tool to demonstrate subjective improvement (Progressing)       Start:  07/08/25    Expected End:  08/22/25            Patient stated goal: Pt would like to improve ROM and strength of the L shoulder to improve reaching abilities.  (Progressing)       Start:  07/08/25    Expected End:  08/22/25            Patient will demonstrate independence in home program for support of progression (Progressing)       Start:  07/08/25    Expected End:  08/22/25               Pain       Patient will report a 2 point reduction in pain while performing reaching. (Progressing)       Start:  07/08/25    Expected End:  08/01/25               Range of Motion       Patient will achieve left shoulder flexion of 180 degrees (Met)       Start:  07/08/25    Expected End:  08/01/25    Resolved:  07/21/25         Patient will achieve left shoulder abduction of 170 degrees (Progressing)       Start:  07/08/25    Expected End:  08/01/25               Strength       Patient will achieve left shoulder flexion strength of 4/5 (Progressing)       Start:  07/08/25    Expected End:  08/01/25            Patient will achieve left shoulder extension strength of 5/5 (Progressing)       Start:  07/08/25    Expected End:  08/01/25            Patient will achieve left shoulder abduction strength of 4+/5 (Progressing)       Start:  07/08/25    Expected End:  08/01/25            Patient will achieve left shoulder external rotation strength of 4-/5 in 90 degrees abd (Progressing)       Start:  07/08/25    Expected End:  08/01/25            Patient will achieve left shoulder internal rotation strength of 4+/5 in 90 degrees abd (Progressing)       Start:  07/08/25    Expected End:  08/01/25                Faye Em, PT, DPT

## 2025-07-24 ENCOUNTER — CLINICAL SUPPORT (OUTPATIENT)
Dept: REHABILITATION | Facility: HOSPITAL | Age: 50
End: 2025-07-24
Attending: PHYSICAL THERAPIST
Payer: COMMERCIAL

## 2025-07-24 DIAGNOSIS — R29.898 IMPAIRED STRENGTH OF SHOULDER MUSCLES: ICD-10-CM

## 2025-07-24 DIAGNOSIS — M25.612 DECREASED RANGE OF MOTION OF LEFT SHOULDER: Primary | ICD-10-CM

## 2025-07-24 PROCEDURE — 97110 THERAPEUTIC EXERCISES: CPT | Mod: PO | Performed by: PHYSICAL THERAPIST

## 2025-07-24 PROCEDURE — 97140 MANUAL THERAPY 1/> REGIONS: CPT | Mod: PO | Performed by: PHYSICAL THERAPIST

## 2025-07-24 NOTE — PROGRESS NOTES
"  Outpatient Rehab    Physical Therapy Visit    Patient Name: Vera De Leon  MRN: [id number]  YOB: 1975  Encounter Date: 7/24/2025    Therapy Diagnosis:   Encounter Diagnoses   Name Primary?    Decreased range of motion of left shoulder Yes    Impaired strength of shoulder muscles      Physician: Jorge Grajeda MD    Physician Orders: Eval and Treat  Medical Diagnosis: Tendinopathy of left rotator cuff  Surgical Diagnosis: Not applicable for this Episode   Surgical Date: Not applicable for this Episode  Days Since Last Surgery: Not applicable for this Episode    Visit # / Visits Authorized:  7 / 20  Insurance Authorization Period: 7/2/2025 to 12/31/2025  Date of Evaluation: 7/8/2025  Plan of Care Certification: 7/8/2025 to 8/22/2025      PT/PTA:     Number of PTA visits since last PT visit:   Time In: 1000   Time Out: 1100  Total Time (in minutes): 60   Total Billable Time (in minutes): 55    FOTO:  Intake Score (%): 52  Survey Score 2 (%): 60  Survey Score 3 (%): Not applicable for this Episode    Precautions:         Subjective             Objective            Treatment:  Therapeutic Exercise  TE 1: Pulleys flex/abd x 3 minutes ea  TE 3: AAROM L shoulder flex/abd/ER @ 90 deg 3# dowel 2 x 10 ea  TE 4: Pec stretch over towell roll x 3 min  TE 5: UBE x 6 minutes (forward/backward)  TE 6: Sleeper stretch 3 x 30"  Manual Therapy  MT 5: Informed consent received. Pt education provided and consent reviewed. DN performed in prone and supine at infraspinatus following palpation and reproduced sx. Appropriate response was noted. + ttp L infraspinatus. .30 x 40 mm needles.      Time Entry(in minutes):  Manual Therapy Time Entry: 25  Therapeutic Exercise Time Entry: 25    Assessment & Plan   Assessment: Vera tolerated DN well without adverse effect. She has pain with dressing. She continues with impingement in Left shoulder Abduction. She will benefit from return to prior program next visit and cont " DN when with PT.  Evaluation/Treatment Tolerance: Patient tolerated treatment well    The patient will continue to benefit from skilled outpatient physical therapy in order to address the deficits listed in the problem list on the initial evaluation, provide patient and family education, and maximize the patients level of independence in the home and community environments.     The patient's spiritual, cultural, and educational needs were considered, and the patient is agreeable to the plan of care and goals.           Plan: Continued with POC    Goals:   Active       Functional outcome       Patient will show a significant change in FOTO-70 patient-reported outcome tool to demonstrate subjective improvement (Progressing)       Start:  07/08/25    Expected End:  08/22/25            Patient stated goal: Pt would like to improve ROM and strength of the L shoulder to improve reaching abilities.  (Progressing)       Start:  07/08/25    Expected End:  08/22/25            Patient will demonstrate independence in home program for support of progression (Progressing)       Start:  07/08/25    Expected End:  08/22/25               Pain       Patient will report a 2 point reduction in pain while performing reaching. (Progressing)       Start:  07/08/25    Expected End:  08/01/25               Range of Motion       Patient will achieve left shoulder flexion of 180 degrees (Met)       Start:  07/08/25    Expected End:  08/01/25    Resolved:  07/21/25         Patient will achieve left shoulder abduction of 170 degrees (Progressing)       Start:  07/08/25    Expected End:  08/01/25               Strength       Patient will achieve left shoulder flexion strength of 4/5 (Progressing)       Start:  07/08/25    Expected End:  08/01/25            Patient will achieve left shoulder extension strength of 5/5 (Progressing)       Start:  07/08/25    Expected End:  08/01/25            Patient will achieve left shoulder abduction strength  of 4+/5 (Progressing)       Start:  07/08/25    Expected End:  08/01/25            Patient will achieve left shoulder external rotation strength of 4-/5 in 90 degrees abd (Progressing)       Start:  07/08/25    Expected End:  08/01/25            Patient will achieve left shoulder internal rotation strength of 4+/5 in 90 degrees abd (Progressing)       Start:  07/08/25    Expected End:  08/01/25                Faye Em PT, DPT

## 2025-07-25 ENCOUNTER — CLINICAL SUPPORT (OUTPATIENT)
Dept: REHABILITATION | Facility: HOSPITAL | Age: 50
End: 2025-07-25
Payer: COMMERCIAL

## 2025-07-25 DIAGNOSIS — M25.612 DECREASED RANGE OF MOTION OF LEFT SHOULDER: Primary | ICD-10-CM

## 2025-07-25 DIAGNOSIS — R29.898 IMPAIRED STRENGTH OF SHOULDER MUSCLES: ICD-10-CM

## 2025-07-25 PROCEDURE — 97110 THERAPEUTIC EXERCISES: CPT | Mod: PO,CQ

## 2025-07-25 PROCEDURE — 97112 NEUROMUSCULAR REEDUCATION: CPT | Mod: PO,CQ

## 2025-07-25 NOTE — PROGRESS NOTES
"  Outpatient Rehab    Physical Therapy Visit    Patient Name: Vera De Leon  MRN: [id number]  YOB: 1975  Encounter Date: 7/25/2025    Therapy Diagnosis:   Encounter Diagnoses   Name Primary?    Decreased range of motion of left shoulder Yes    Impaired strength of shoulder muscles      Physician: Jorge Grajeda MD    Physician Orders: Eval and Treat  Medical Diagnosis: Tendinopathy of left rotator cuff  Surgical Diagnosis: Not applicable for this Episode   Surgical Date: Not applicable for this Episode  Days Since Last Surgery: Not applicable for this Episode    Visit # / Visits Authorized:  8 / 20  Insurance Authorization Period: 7/2/2025 to 12/31/2025  Date of Evaluation: 7/8/2025  Plan of Care Certification: 7/8/2025 to 8/22/2025      PT/PTA: PTA   Number of PTA visits since last PT visit:1  Time In: 0853   Time Out: 0946  Total Time (in minutes): 53   Total Billable Time (in minutes): 53    FOTO:  Intake Score (%): 52  Survey Score 2 (%): 60  Survey Score 3 (%): Not applicable for this Episode    Precautions:         Subjective   Pt reports min increase in L shoulder pain levels and "tightness" following dry needling performed last visit.  Pain reported as 3/10.      Objective            Treatment:  Therapeutic Exercise  TE 1: Pulleys flex/abd x 3 minutes ea  TE 3: AAROM L shoulder flex/abd/ER @ 90 deg 3# dowel 2 x 10 ea  TE 4: Pec stretch over towell roll x 3 min  TE 5: UBE x 6 minutes (forward/backward)  TE 6: Sleeper stretch 3 x 30"  Balance/Neuromuscular Re-Education  NMR 1: Scap retractions x 30 5" holds  NMR 2: Supine horizontal abd HOB elvated 30 degrees GTB 3 x 10  NMR 3: Supine diaganols HOB elvated 30 degrees GTB 2 x 10 ea, supine punch 2 x 10 2#  NMR 4: No Money GTB 3 x 10  NMR 5: Rows GTB 3 x 10  NMR 6: Shoulder ext GTB 3 x 10  NMR 7: SL ER 2# 3 x 10  NMR 8: Farmer's carry 20# x 3 laps on turf  NMR 9: Serratus wall slides x 20  NMR 10: ER walkout GTB x 15        Time Entry(in " minutes):  Neuromuscular Re-Education Time Entry: 30  Therapeutic Exercise Time Entry: 23    Assessment & Plan   Assessment: Vera returned reporting slight increase in L shoulder pain and muscular soreness/tightness following dry needling last visit. Treatment continued with L shoulder/periscapular mobility and strengthening with many exercises reincorporated into treatment Good tolerance today with pt noting resolution of pain post treatment. Will continue to progress per pt's tolerance.       The patient will continue to benefit from skilled outpatient physical therapy in order to address the deficits listed in the problem list on the initial evaluation, provide patient and family education, and maximize the patients level of independence in the home and community environments.     The patient's spiritual, cultural, and educational needs were considered, and the patient is agreeable to the plan of care and goals.           Plan: Continued with POC    Goals:   Active       Functional outcome       Patient will show a significant change in FOTO-70 patient-reported outcome tool to demonstrate subjective improvement (Progressing)       Start:  07/08/25    Expected End:  08/22/25            Patient stated goal: Pt would like to improve ROM and strength of the L shoulder to improve reaching abilities.  (Progressing)       Start:  07/08/25    Expected End:  08/22/25            Patient will demonstrate independence in home program for support of progression (Progressing)       Start:  07/08/25    Expected End:  08/22/25               Pain       Patient will report a 2 point reduction in pain while performing reaching. (Progressing)       Start:  07/08/25    Expected End:  08/01/25               Range of Motion       Patient will achieve left shoulder flexion of 180 degrees (Met)       Start:  07/08/25    Expected End:  08/01/25    Resolved:  07/21/25         Patient will achieve left shoulder abduction of 170 degrees  (Progressing)       Start:  07/08/25    Expected End:  08/01/25               Strength       Patient will achieve left shoulder flexion strength of 4/5 (Progressing)       Start:  07/08/25    Expected End:  08/01/25            Patient will achieve left shoulder extension strength of 5/5 (Progressing)       Start:  07/08/25    Expected End:  08/01/25            Patient will achieve left shoulder abduction strength of 4+/5 (Progressing)       Start:  07/08/25    Expected End:  08/01/25            Patient will achieve left shoulder external rotation strength of 4-/5 in 90 degrees abd (Progressing)       Start:  07/08/25    Expected End:  08/01/25            Patient will achieve left shoulder internal rotation strength of 4+/5 in 90 degrees abd (Progressing)       Start:  07/08/25    Expected End:  08/01/25                Conrad Houston, PTA

## 2025-07-28 ENCOUNTER — PATIENT MESSAGE (OUTPATIENT)
Facility: CLINIC | Age: 50
End: 2025-07-28
Payer: COMMERCIAL

## 2025-07-29 ENCOUNTER — CLINICAL SUPPORT (OUTPATIENT)
Dept: REHABILITATION | Facility: HOSPITAL | Age: 50
End: 2025-07-29
Payer: COMMERCIAL

## 2025-07-29 DIAGNOSIS — M25.612 DECREASED RANGE OF MOTION OF LEFT SHOULDER: Primary | ICD-10-CM

## 2025-07-29 DIAGNOSIS — R29.898 IMPAIRED STRENGTH OF SHOULDER MUSCLES: ICD-10-CM

## 2025-07-29 PROCEDURE — 97112 NEUROMUSCULAR REEDUCATION: CPT | Mod: PO | Performed by: PHYSICAL THERAPIST

## 2025-07-29 PROCEDURE — 97140 MANUAL THERAPY 1/> REGIONS: CPT | Mod: PO | Performed by: PHYSICAL THERAPIST

## 2025-07-29 PROCEDURE — 97110 THERAPEUTIC EXERCISES: CPT | Mod: PO | Performed by: PHYSICAL THERAPIST

## 2025-07-29 NOTE — PROGRESS NOTES
"  Outpatient Rehab    Physical Therapy Visit    Patient Name: Vera De Leon  MRN: [id number]  YOB: 1975  Encounter Date: 7/29/2025    Therapy Diagnosis:   Encounter Diagnoses   Name Primary?    Decreased range of motion of left shoulder Yes    Impaired strength of shoulder muscles      Physician: Jorge Grajeda MD    Physician Orders: Eval and Treat  Medical Diagnosis: Tendinopathy of left rotator cuff  Surgical Diagnosis: Not applicable for this Episode   Surgical Date: Not applicable for this Episode  Days Since Last Surgery: Not applicable for this Episode    Visit # / Visits Authorized:  9 / 20  Insurance Authorization Period: 7/2/2025 to 12/31/2025  Date of Evaluation: 7/8/2025  Plan of Care Certification: 7/8/2025 to 8/22/2025      PT/PTA: PT   Number of PTA visits since last PT visit:0  Time In: 1100   Time Out: 1200  Total Time (in minutes): 60   Total Billable Time (in minutes): 54    FOTO:  Intake Score (%): 52  Survey Score 2 (%): 60  Survey Score 3 (%): Not applicable for this Episode    Precautions:         Subjective   Pt continues to report pain with reaching into abd and posterior..  Pain reported as 3/10.      Objective            Treatment:  Therapeutic Exercise  TE 1: Pulleys flex/abd x 3 minutes ea  TE 3: AAROM L shoulder flex/abd/ER @ 90 deg 3# dowel 2 x 10 ea  TE 4: Pec stretch over towell roll x 3 min  TE 5: UBE x 6 minutes (forward/backward)  TE 6: Sleeper stretch 3 x 30"  Manual Therapy  MT 2: Posterior/inferior G/H joint mobs  Balance/Neuromuscular Re-Education  NMR 1: Scap retractions x 30 5" holds  NMR 2: Supine horizontal abd HOB elvated 30 degrees GTB 3 x 10  NMR 3: Supine diaganols HOB elvated 30 degrees GTB 2 x 10 ea, supine punch 2 x 10 2#  NMR 4: No Money GTB 3 x 10  NMR 5: Rows GTB 3 x 10  NMR 6: Shoulder ext GTB 3 x 10  NMR 7: SL ER 2# 3 x 10        Time Entry(in minutes):  Manual Therapy Time Entry: 10  Neuromuscular Re-Education Time Entry: " 25  Therapeutic Exercise Time Entry: 25    Assessment & Plan   Assessment: Vera returned reporting continued L shoulder pain with reaching posterior. MT mildly improves posterior reach. Cont RTC and posterior shoulder stability with anterior shoulder flexibility. Will continue to progress per pt's tolerance.  Evaluation/Treatment Tolerance: Patient tolerated treatment well    The patient will continue to benefit from skilled outpatient physical therapy in order to address the deficits listed in the problem list on the initial evaluation, provide patient and family education, and maximize the patients level of independence in the home and community environments.     The patient's spiritual, cultural, and educational needs were considered, and the patient is agreeable to the plan of care and goals.           Plan: Continued with POC    Goals:   Active       Functional outcome       Patient will show a significant change in FOTO-70 patient-reported outcome tool to demonstrate subjective improvement (Progressing)       Start:  07/08/25    Expected End:  08/22/25            Patient stated goal: Pt would like to improve ROM and strength of the L shoulder to improve reaching abilities.  (Progressing)       Start:  07/08/25    Expected End:  08/22/25            Patient will demonstrate independence in home program for support of progression (Progressing)       Start:  07/08/25    Expected End:  08/22/25               Pain       Patient will report a 2 point reduction in pain while performing reaching. (Progressing)       Start:  07/08/25    Expected End:  08/01/25               Range of Motion       Patient will achieve left shoulder flexion of 180 degrees (Met)       Start:  07/08/25    Expected End:  08/01/25    Resolved:  07/21/25         Patient will achieve left shoulder abduction of 170 degrees (Progressing)       Start:  07/08/25    Expected End:  08/01/25               Strength       Patient will achieve left  shoulder flexion strength of 4/5 (Progressing)       Start:  07/08/25    Expected End:  08/01/25            Patient will achieve left shoulder extension strength of 5/5 (Progressing)       Start:  07/08/25    Expected End:  08/01/25            Patient will achieve left shoulder abduction strength of 4+/5 (Progressing)       Start:  07/08/25    Expected End:  08/01/25            Patient will achieve left shoulder external rotation strength of 4-/5 in 90 degrees abd (Progressing)       Start:  07/08/25    Expected End:  08/01/25            Patient will achieve left shoulder internal rotation strength of 4+/5 in 90 degrees abd (Progressing)       Start:  07/08/25    Expected End:  08/01/25                Faye Em, PT, DPT

## 2025-07-31 ENCOUNTER — CLINICAL SUPPORT (OUTPATIENT)
Dept: REHABILITATION | Facility: HOSPITAL | Age: 50
End: 2025-07-31
Payer: COMMERCIAL

## 2025-07-31 DIAGNOSIS — R29.898 IMPAIRED STRENGTH OF SHOULDER MUSCLES: ICD-10-CM

## 2025-07-31 DIAGNOSIS — M25.612 DECREASED RANGE OF MOTION OF LEFT SHOULDER: Primary | ICD-10-CM

## 2025-07-31 PROCEDURE — 97140 MANUAL THERAPY 1/> REGIONS: CPT | Mod: PO,CQ

## 2025-07-31 PROCEDURE — 97112 NEUROMUSCULAR REEDUCATION: CPT | Mod: PO,CQ

## 2025-07-31 PROCEDURE — 97110 THERAPEUTIC EXERCISES: CPT | Mod: PO,CQ

## 2025-07-31 NOTE — PROGRESS NOTES
"Outpatient Rehab    Physical Therapy Visit    Patient Name: Vera De Leon  MRN: [id number]  YOB: 1975  Encounter Date: 7/31/2025    Therapy Diagnosis:   Encounter Diagnoses   Name Primary?    Decreased range of motion of left shoulder Yes    Impaired strength of shoulder muscles      Physician: Jorge Grajeda MD    Physician Orders: Eval and Treat  Medical Diagnosis: Tendinopathy of left rotator cuff  Surgical Diagnosis: Not applicable for this Episode   Surgical Date: Not applicable for this Episode  Days Since Last Surgery: Not applicable for this Episode    Visit # / Visits Authorized:  10 / 20  Insurance Authorization Period: 7/2/2025 to 12/31/2025  Date of Evaluation: 7/8/2025  Plan of Care Certification: 7/8/2025 to 8/22/2025      PT/PTA: PTA   Number of PTA visits since last PT visit:1  Time In: 0955   Time Out: 1048  Total Time (in minutes): 53   Total Billable Time (in minutes): 53    FOTO:  Intake Score (%): 52  Survey Score 2 (%): 60  Survey Score 3 (%): Not applicable for this Episode    Precautions:         Subjective   Pt reports that during her aerobics class today she was more aware of decreasing UT activation with abd motion and noticed less discomfort with motion.  Pain reported as 2/10.      Objective            Treatment:  Therapeutic Exercise  TE 1: Pulleys flex/abd x 3 minutes ea  TE 3: AAROM L shoulder flex/abd/ER @ 90 deg 3# dowel 2 x 10 ea  TE 4: Pec stretch over towell roll x 3 min  TE 5: UBE x 6 minutes (forward/backward)  TE 6: Sleeper stretch 3 x 30"  Manual Therapy  MT 2: Posterior/inferior G/H joint mobs  Balance/Neuromuscular Re-Education  NMR 1: Scap retractions x 30 5" holds  NMR 2: Supine horizontal abd HOB elvated 30 degrees GTB 3 x 10  NMR 3: Supine diaganols HOB elvated 30 degrees GTB 2 x 10 ea, supine punch 2 x 10 5#  NMR 4: No Money GTB 3 x 10  NMR 5: Rows GTB 3 x 10  NMR 6: Shoulder ext GTB 3 x 10  NMR 7: SL ER 3# 3 x 10  NMR 9: Serratus wall slides x " 20  NMR 10: Landmine press 5# flex/scaption 3 x 10 ea        Time Entry(in minutes):  Manual Therapy Time Entry: 8  Neuromuscular Re-Education Time Entry: 25  Therapeutic Exercise Time Entry: 20    Assessment & Plan   Assessment: Vera returned reporting improved comfort and ROM with shoulder flex and abd motions since focusing on decreasing UT activation. Today's treatment continued focus on RTC and periscapular strengthening and stability. Landmine presses added to today to continue to challenge scapulohumeral rhythm with shoulder flex/abd. Good tolerance with pt demonstrated improved mobility and pain free shoulder flex/abd. Will continue to progress per pt's tolerance.       The patient will continue to benefit from skilled outpatient physical therapy in order to address the deficits listed in the problem list on the initial evaluation, provide patient and family education, and maximize the patients level of independence in the home and community environments.     The patient's spiritual, cultural, and educational needs were considered, and the patient is agreeable to the plan of care and goals.           Plan: Continue with POC    Goals:   Active       Functional outcome       Patient will show a significant change in FOTO-70 patient-reported outcome tool to demonstrate subjective improvement (Progressing)       Start:  07/08/25    Expected End:  08/22/25            Patient stated goal: Pt would like to improve ROM and strength of the L shoulder to improve reaching abilities.  (Progressing)       Start:  07/08/25    Expected End:  08/22/25            Patient will demonstrate independence in home program for support of progression (Progressing)       Start:  07/08/25    Expected End:  08/22/25               Pain       Patient will report a 2 point reduction in pain while performing reaching. (Progressing)       Start:  07/08/25    Expected End:  08/01/25               Range of Motion       Patient will  achieve left shoulder flexion of 180 degrees (Met)       Start:  07/08/25    Expected End:  08/01/25    Resolved:  07/21/25         Patient will achieve left shoulder abduction of 170 degrees (Progressing)       Start:  07/08/25    Expected End:  08/01/25               Strength       Patient will achieve left shoulder flexion strength of 4/5 (Progressing)       Start:  07/08/25    Expected End:  08/01/25            Patient will achieve left shoulder extension strength of 5/5 (Progressing)       Start:  07/08/25    Expected End:  08/01/25            Patient will achieve left shoulder abduction strength of 4+/5 (Progressing)       Start:  07/08/25    Expected End:  08/01/25            Patient will achieve left shoulder external rotation strength of 4-/5 in 90 degrees abd (Progressing)       Start:  07/08/25    Expected End:  08/01/25            Patient will achieve left shoulder internal rotation strength of 4+/5 in 90 degrees abd (Progressing)       Start:  07/08/25    Expected End:  08/01/25                Conrad Houston, PTA

## 2025-08-01 ENCOUNTER — CLINICAL SUPPORT (OUTPATIENT)
Dept: REHABILITATION | Facility: HOSPITAL | Age: 50
End: 2025-08-01
Payer: COMMERCIAL

## 2025-08-01 DIAGNOSIS — M25.612 DECREASED RANGE OF MOTION OF LEFT SHOULDER: Primary | ICD-10-CM

## 2025-08-01 DIAGNOSIS — R29.898 IMPAIRED STRENGTH OF SHOULDER MUSCLES: ICD-10-CM

## 2025-08-01 PROCEDURE — 97110 THERAPEUTIC EXERCISES: CPT | Mod: PO | Performed by: PHYSICAL THERAPIST

## 2025-08-01 PROCEDURE — 97140 MANUAL THERAPY 1/> REGIONS: CPT | Mod: PO | Performed by: PHYSICAL THERAPIST

## 2025-08-01 PROCEDURE — 97112 NEUROMUSCULAR REEDUCATION: CPT | Mod: PO | Performed by: PHYSICAL THERAPIST

## 2025-08-01 NOTE — PROGRESS NOTES
"Outpatient Rehab    Physical Therapy Visit    Patient Name: Vera De Leon  MRN: [id number]  YOB: 1975  Encounter Date: 8/1/2025    Therapy Diagnosis:   Encounter Diagnoses   Name Primary?    Decreased range of motion of left shoulder Yes    Impaired strength of shoulder muscles      Physician: Jorge Grajeda MD    Physician Orders: Eval and Treat  Medical Diagnosis: Tendinopathy of left rotator cuff  Surgical Diagnosis: Not applicable for this Episode   Surgical Date: Not applicable for this Episode  Days Since Last Surgery: Not applicable for this Episode    Visit # / Visits Authorized:  11 / 20  Insurance Authorization Period: 7/2/2025 to 12/31/2025  Date of Evaluation: 7/8/2025  Plan of Care Certification: 7/8/2025 to 8/22/2025      PT/PTA: PT   Number of PTA visits since last PT visit:1  Time In: 0900   Time Out: 1000  Total Time (in minutes): 60   Total Billable Time (in minutes): 55    FOTO:  Intake Score (%): 52  Survey Score 2 (%): 60  Survey Score 3 (%): Not applicable for this Episode    Precautions:         Subjective   Less pain with shoulder abd when maintaining shoulder down and back..  Pain reported as 0/10.      Objective            Treatment:  Therapeutic Exercise  TE 3: AAROM L shoulder abd @ 3# dowel 2 x 10 ea  TE 4: Pec stretch over towell roll x 3 min  TE 5: UBE x 6 minutes (forward/backward)  TE 6: Sleeper stretch 3 x 30"  Manual Therapy  MT 6: Pec muscle bending, STM, CFM to insertion  MT 7: UT stretch and STM  MT 8: MWM in GHJ inf glide during active abd  Balance/Neuromuscular Re-Education  NMR 1: Scap retractions x 30 5" holds  NMR 2: Supine horizontal abd HOB elvated 30 degrees GTB 3 x 10  NMR 3: Supine diaganols HOB elvated 30 degrees GTB 2 x 10 ea, supine punch 2 x 10 5#  NMR 4: No Money GTB 3 x 10  NMR 5: Rows GTB 3 x 10  NMR 6: Shoulder ext GTB 3 x 10  NMR 7: SL ER 3# 3 x 10, s/l shld abd 2 x 10 0#  NMR 8: Farmer's carry 5# x 3 laps on turf 90 deg abd  NMR 9: " Serratus wall slides x 20        Time Entry(in minutes):  Manual Therapy Time Entry: 15  Neuromuscular Re-Education Time Entry: 25  Therapeutic Exercise Time Entry: 20    Assessment & Plan   Assessment: Vera had improved active Left shoulder Abduction following MWM and STM work this date. + side lying shld Abduction and farmers Abduction carry with progress. Patient has improving awareness and mech. Cont plan.  Evaluation/Treatment Tolerance: Patient tolerated treatment well    The patient will continue to benefit from skilled outpatient physical therapy in order to address the deficits listed in the problem list on the initial evaluation, provide patient and family education, and maximize the patients level of independence in the home and community environments.     The patient's spiritual, cultural, and educational needs were considered, and the patient is agreeable to the plan of care and goals.           Plan: Continue with POC    Goals:   Active       Functional outcome       Patient will show a significant change in FOTO-70 patient-reported outcome tool to demonstrate subjective improvement (Progressing)       Start:  07/08/25    Expected End:  08/22/25            Patient stated goal: Pt would like to improve ROM and strength of the L shoulder to improve reaching abilities.  (Progressing)       Start:  07/08/25    Expected End:  08/22/25            Patient will demonstrate independence in home program for support of progression (Progressing)       Start:  07/08/25    Expected End:  08/22/25               Pain       Patient will report a 2 point reduction in pain while performing reaching. (Progressing)       Start:  07/08/25    Expected End:  08/01/25               Range of Motion       Patient will achieve left shoulder flexion of 180 degrees (Met)       Start:  07/08/25    Expected End:  08/01/25    Resolved:  07/21/25         Patient will achieve left shoulder abduction of 170 degrees (Progressing)        Start:  07/08/25    Expected End:  08/01/25               Strength       Patient will achieve left shoulder flexion strength of 4/5 (Progressing)       Start:  07/08/25    Expected End:  08/01/25            Patient will achieve left shoulder extension strength of 5/5 (Progressing)       Start:  07/08/25    Expected End:  08/01/25            Patient will achieve left shoulder abduction strength of 4+/5 (Progressing)       Start:  07/08/25    Expected End:  08/01/25            Patient will achieve left shoulder external rotation strength of 4-/5 in 90 degrees abd (Progressing)       Start:  07/08/25    Expected End:  08/01/25            Patient will achieve left shoulder internal rotation strength of 4+/5 in 90 degrees abd (Progressing)       Start:  07/08/25    Expected End:  08/01/25                Faye Em, PT, DPT

## 2025-08-05 ENCOUNTER — CLINICAL SUPPORT (OUTPATIENT)
Dept: REHABILITATION | Facility: HOSPITAL | Age: 50
End: 2025-08-05
Payer: COMMERCIAL

## 2025-08-05 DIAGNOSIS — R29.898 IMPAIRED STRENGTH OF SHOULDER MUSCLES: ICD-10-CM

## 2025-08-05 DIAGNOSIS — M25.612 DECREASED RANGE OF MOTION OF LEFT SHOULDER: Primary | ICD-10-CM

## 2025-08-05 PROCEDURE — 97110 THERAPEUTIC EXERCISES: CPT | Mod: PO,CQ

## 2025-08-05 PROCEDURE — 97112 NEUROMUSCULAR REEDUCATION: CPT | Mod: PO,CQ

## 2025-08-05 PROCEDURE — 97140 MANUAL THERAPY 1/> REGIONS: CPT | Mod: PO,CQ

## 2025-08-05 NOTE — PROGRESS NOTES
"Outpatient Rehab    Physical Therapy Visit    Patient Name: Vera De Leon  MRN: [id number]  YOB: 1975  Encounter Date: 8/5/2025    Therapy Diagnosis:   Encounter Diagnoses   Name Primary?    Decreased range of motion of left shoulder Yes    Impaired strength of shoulder muscles      Physician: Jorge Grajeda MD    Physician Orders: Eval and Treat  Medical Diagnosis: Tendinopathy of left rotator cuff  Surgical Diagnosis: Not applicable for this Episode   Surgical Date: Not applicable for this Episode  Days Since Last Surgery: Not applicable for this Episode    Visit # / Visits Authorized:  12 / 20  Insurance Authorization Period: 7/2/2025 to 12/31/2025  Date of Evaluation: 7/8/2025  Plan of Care Certification: 7/8/2025 to 8/22/2025      PT/PTA: PTA   Number of PTA visits since last PT visit:1  Time In: 0950   Time Out: 1046  Total Time (in minutes): 56   Total Billable Time (in minutes): 56    FOTO:  Intake Score (%): 52  Survey Score 2 (%): 60  Survey Score 3 (%): Not applicable for this Episode    Precautions:         Subjective   Pt reports no pain unless she reaches in a shoulder abd and externally rotated motion where she feels increases in lateral shoulder pain.  Pain reported as 0/10.      Objective            Treatment:  Therapeutic Exercise  TE 3: AAROM L shoulder abd @ 3# dowel 2 x 10 ea  TE 4: Pec stretch over towell roll x 3 min  TE 5: UBE x 6 minutes (forward/backward)  TE 6: Sleeper stretch 3 x 30"  Manual Therapy  MT 6: Pec muscle bending, STM, CFM to insertion  MT 7: UT stretch and STM  MT 8: MWM in GHJ inf glide during active abd  Balance/Neuromuscular Re-Education  NMR 1: Scap retractions x 30 5" holds  NMR 2: Supine horizontal abd HOB elvated 30 degrees GTB 3 x 10  NMR 3: Supine diaganols HOB elvated 30 degrees GTB 2 x 10 ea, supine punch 2 x 10 5#  NMR 4: No Money GTB 3 x 10  NMR 5: Rows GTB 3 x 10  NMR 6: Shoulder ext GTB 3 x 10  NMR 7: SL ER 4# 3 x 10, s/l shld abd 2 x 10 " 0#  NMR 8: Farmer's carry 5# x 3 laps on turf 90 deg abd  NMR 9: Serratus wall slides x 20        Time Entry(in minutes):  Manual Therapy Time Entry: 10  Neuromuscular Re-Education Time Entry: 30  Therapeutic Exercise Time Entry: 15    Assessment & Plan   Assessment: Mobilizations with movement continued with pt demonstrating improved pain free shoulder abd motion. Resistance on SL ER was progressed with no adverse effects as well. Overall pt continues to verbalize increased understanding of posture awareness and scapulohumeral mechanism. Will continue to progress per pt's tolerance.       The patient will continue to benefit from skilled outpatient physical therapy in order to address the deficits listed in the problem list on the initial evaluation, provide patient and family education, and maximize the patients level of independence in the home and community environments.     The patient's spiritual, cultural, and educational needs were considered, and the patient is agreeable to the plan of care and goals.           Plan: Continue with POC    Goals:   Active       Functional outcome       Patient will show a significant change in FOTO-70 patient-reported outcome tool to demonstrate subjective improvement (Progressing)       Start:  07/08/25    Expected End:  08/22/25            Patient stated goal: Pt would like to improve ROM and strength of the L shoulder to improve reaching abilities.  (Progressing)       Start:  07/08/25    Expected End:  08/22/25            Patient will demonstrate independence in home program for support of progression (Progressing)       Start:  07/08/25    Expected End:  08/22/25               Pain       Patient will report a 2 point reduction in pain while performing reaching. (Progressing)       Start:  07/08/25    Expected End:  08/01/25               Range of Motion       Patient will achieve left shoulder flexion of 180 degrees (Met)       Start:  07/08/25    Expected End:   08/01/25    Resolved:  07/21/25         Patient will achieve left shoulder abduction of 170 degrees (Progressing)       Start:  07/08/25    Expected End:  08/01/25               Strength       Patient will achieve left shoulder flexion strength of 4/5 (Progressing)       Start:  07/08/25    Expected End:  08/01/25            Patient will achieve left shoulder extension strength of 5/5 (Progressing)       Start:  07/08/25    Expected End:  08/01/25            Patient will achieve left shoulder abduction strength of 4+/5 (Progressing)       Start:  07/08/25    Expected End:  08/01/25            Patient will achieve left shoulder external rotation strength of 4-/5 in 90 degrees abd (Progressing)       Start:  07/08/25    Expected End:  08/01/25            Patient will achieve left shoulder internal rotation strength of 4+/5 in 90 degrees abd (Progressing)       Start:  07/08/25    Expected End:  08/01/25                Conrad Houston, PTA

## 2025-08-07 ENCOUNTER — CLINICAL SUPPORT (OUTPATIENT)
Dept: REHABILITATION | Facility: HOSPITAL | Age: 50
End: 2025-08-07
Payer: COMMERCIAL

## 2025-08-07 DIAGNOSIS — R29.898 IMPAIRED STRENGTH OF SHOULDER MUSCLES: ICD-10-CM

## 2025-08-07 DIAGNOSIS — M25.612 DECREASED RANGE OF MOTION OF LEFT SHOULDER: Primary | ICD-10-CM

## 2025-08-07 PROCEDURE — 97110 THERAPEUTIC EXERCISES: CPT | Mod: PO,CQ

## 2025-08-07 PROCEDURE — 97140 MANUAL THERAPY 1/> REGIONS: CPT | Mod: PO,CQ

## 2025-08-07 PROCEDURE — 97112 NEUROMUSCULAR REEDUCATION: CPT | Mod: PO,CQ

## 2025-08-07 NOTE — PROGRESS NOTES
"Outpatient Rehab    Physical Therapy Visit    Patient Name: Vera De Leon  MRN: [id number]  YOB: 1975  Encounter Date: 8/7/2025    Therapy Diagnosis:   Encounter Diagnoses   Name Primary?    Decreased range of motion of left shoulder Yes    Impaired strength of shoulder muscles      Physician: Jorge Grajeda MD    Physician Orders: Eval and Treat  Medical Diagnosis: Tendinopathy of left rotator cuff  Surgical Diagnosis: Not applicable for this Episode   Surgical Date: Not applicable for this Episode  Days Since Last Surgery: Not applicable for this Episode    Visit # / Visits Authorized:  13 / 20  Insurance Authorization Period: 7/2/2025 to 12/31/2025  Date of Evaluation: 7/8/2025  Plan of Care Certification: 7/8/2025 to 8/22/2025      PT/PTA: PTA   Number of PTA visits since last PT visit:2  Time In: 1000   Time Out: 1050  Total Time (in minutes): 50   Total Billable Time (in minutes): 50    FOTO:  Intake Score (%): 52  Survey Score 2 (%): 60  Survey Score 3 (%): Not applicable for this Episode    Precautions:         Subjective   Pt reprots mopping her bathroom floor yesterday for the first time without any adverse effects or soreness afterwards.  Pain reported as 0/10.      Objective            Treatment:  Therapeutic Exercise  TE 3: AAROM L shoulder abd @ 3# dowel 2 x 10 ea  TE 4: Pec stretch over towell roll x 3 min  TE 5: UBE x 6 minutes (forward/backward)  TE 6: Sleeper stretch 3 x 30"  Manual Therapy  MT 6: Pec muscle bending, STM, CFM to insertion  MT 7: UT stretch and STM  MT 8: MWM in GHJ inf glide during active abd  Balance/Neuromuscular Re-Education  NMR 1: Scap retractions x 30 5" holds  NMR 2: Supine horizontal abd HOB elvated 30 degrees GTB 3 x 10  NMR 3: Supine diaganols HOB elvated 30 degrees GTB 2 x 10 ea, supine punch 2 x 10 5#  NMR 4: No Money GTB 3 x 10  NMR 5: Rows GTB 3 x 10  NMR 6: Shoulder ext GTB 3 x 10  NMR 7: SL ER 4# 3 x 10, s/l shld abd 2 x 10 0#  NMR 8: " Farmer's carry 5# x 3 laps on turf 90 deg abd  NMR 9: Serratus wall slides x 20        Time Entry(in minutes):  Manual Therapy Time Entry: 10  Neuromuscular Re-Education Time Entry: 25  Therapeutic Exercise Time Entry: 15    Assessment & Plan   Assessment: Pt continues to verbalize increased understanding of posture awareness and scapulohumeral mechanism. She continues to experience only min anterior shoulder discomfort with SL shoulder abd, but improves with cueing to decrease UT activation. Will continue to progress per pt's tolerance.       The patient will continue to benefit from skilled outpatient physical therapy in order to address the deficits listed in the problem list on the initial evaluation, provide patient and family education, and maximize the patients level of independence in the home and community environments.     The patient's spiritual, cultural, and educational needs were considered, and the patient is agreeable to the plan of care and goals.           Plan: Continue with POC    Goals:   Active       Functional outcome       Patient will show a significant change in FOTO-70 patient-reported outcome tool to demonstrate subjective improvement (Progressing)       Start:  07/08/25    Expected End:  08/22/25            Patient stated goal: Pt would like to improve ROM and strength of the L shoulder to improve reaching abilities.  (Progressing)       Start:  07/08/25    Expected End:  08/22/25            Patient will demonstrate independence in home program for support of progression (Progressing)       Start:  07/08/25    Expected End:  08/22/25               Pain       Patient will report a 2 point reduction in pain while performing reaching. (Progressing)       Start:  07/08/25    Expected End:  08/01/25               Range of Motion       Patient will achieve left shoulder flexion of 180 degrees (Met)       Start:  07/08/25    Expected End:  08/01/25    Resolved:  07/21/25         Patient will  achieve left shoulder abduction of 170 degrees (Progressing)       Start:  07/08/25    Expected End:  08/01/25               Strength       Patient will achieve left shoulder flexion strength of 4/5 (Progressing)       Start:  07/08/25    Expected End:  08/01/25            Patient will achieve left shoulder extension strength of 5/5 (Progressing)       Start:  07/08/25    Expected End:  08/01/25            Patient will achieve left shoulder abduction strength of 4+/5 (Progressing)       Start:  07/08/25    Expected End:  08/01/25            Patient will achieve left shoulder external rotation strength of 4-/5 in 90 degrees abd (Progressing)       Start:  07/08/25    Expected End:  08/01/25            Patient will achieve left shoulder internal rotation strength of 4+/5 in 90 degrees abd (Progressing)       Start:  07/08/25    Expected End:  08/01/25                Conrad Houston, PTA

## 2025-08-08 ENCOUNTER — CLINICAL SUPPORT (OUTPATIENT)
Dept: REHABILITATION | Facility: HOSPITAL | Age: 50
End: 2025-08-08
Payer: COMMERCIAL

## 2025-08-08 DIAGNOSIS — M25.612 DECREASED RANGE OF MOTION OF LEFT SHOULDER: Primary | ICD-10-CM

## 2025-08-08 DIAGNOSIS — R29.898 IMPAIRED STRENGTH OF SHOULDER MUSCLES: ICD-10-CM

## 2025-08-08 PROCEDURE — 97140 MANUAL THERAPY 1/> REGIONS: CPT | Mod: PO | Performed by: PHYSICAL THERAPIST

## 2025-08-08 PROCEDURE — 97112 NEUROMUSCULAR REEDUCATION: CPT | Mod: PO | Performed by: PHYSICAL THERAPIST

## 2025-08-08 PROCEDURE — 97110 THERAPEUTIC EXERCISES: CPT | Mod: PO | Performed by: PHYSICAL THERAPIST

## 2025-08-08 NOTE — PROGRESS NOTES
"Outpatient Rehab    Physical Therapy Visit    Patient Name: Vera De Leon  MRN: [id number]  YOB: 1975  Encounter Date: 8/8/2025    Therapy Diagnosis:   Encounter Diagnoses   Name Primary?    Decreased range of motion of left shoulder Yes    Impaired strength of shoulder muscles      Physician: Jorge Grajeda MD    Physician Orders: Eval and Treat  Medical Diagnosis: Tendinopathy of left rotator cuff  Surgical Diagnosis: Not applicable for this Episode   Surgical Date: Not applicable for this Episode  Days Since Last Surgery: Not applicable for this Episode    Visit # / Visits Authorized:  14 / 20  Insurance Authorization Period: 7/2/2025 to 12/31/2025  Date of Evaluation: 7/8/2025  Plan of Care Certification: 7/8/2025 to 8/22/2025      PT/PTA: PT   Number of PTA visits since last PT visit:2  Time In: 0800   Time Out: 0900  Total Time (in minutes): 60   Total Billable Time (in minutes): 54    FOTO:  Intake Score (%): 52  Survey Score 2 (%): 60  Survey Score 3 (%): Not applicable for this Episode    Precautions:         Subjective   Sore with reaching back but improved a lot since eval..  Pain reported as 0/10.      Objective            Treatment:  Therapeutic Exercise  TE 3: AAROM L shoulder abd @ 3# dowel 2 x 10 ea  TE 4: Pec stretch over towell roll x 3 min  TE 5: UBE x 6 minutes (forward/backward)  TE 6: Sleeper stretch 3 x 30"  TE 7: passive biceps stretch 3 mins  Manual Therapy  MT 1: G/H distraction w/oscilations  MT 2: Posterior/inferior G/H joint mobs  MT 4: pec STM  Balance/Neuromuscular Re-Education  NMR 1: Scap retractions x 30 5" holds  NMR 2: Supine horizontal abd HOB elvated 30 degrees GTB 3 x 10  NMR 3: Supine diaganols HOB elvated 30 degrees GTB 2 x 10 ea, supine punch 2 x 10 5#  NMR 4: No Money blueTB 3 x 10  NMR 5: Rows blueTB 3 x 10  NMR 6: Shoulder ext blueTB 3 x 10  NMR 7: SL ER 4# 3 x 10, s/l shld abd 2 x 10 2#  NMR 8: Farmer's carry 5# x 3 laps on turf 90 deg abd  NMR 9: " Serratus wall slides x 20, ytb        Time Entry(in minutes):  Manual Therapy Time Entry: 8  Neuromuscular Re-Education Time Entry: 35  Therapeutic Exercise Time Entry: 15    Assessment & Plan   Assessment: Pt continues to verbalize increased understanding of posture awareness and scapulohumeral mechanism. She continues to experience only min anterior shoulder discomfort with SL shoulder Abduction and post reach, but improves with cueing to decrease UT activation. Will continue to progress per pt's tolerance. Able to progress a few to blue band this date.  Evaluation/Treatment Tolerance: Patient tolerated treatment well    The patient will continue to benefit from skilled outpatient physical therapy in order to address the deficits listed in the problem list on the initial evaluation, provide patient and family education, and maximize the patients level of independence in the home and community environments.     The patient's spiritual, cultural, and educational needs were considered, and the patient is agreeable to the plan of care and goals.           Plan: Continue with POC    Goals:   Active       Functional outcome       Patient will show a significant change in FOTO-70 patient-reported outcome tool to demonstrate subjective improvement (Progressing)       Start:  07/08/25    Expected End:  08/22/25            Patient stated goal: Pt would like to improve ROM and strength of the L shoulder to improve reaching abilities.  (Progressing)       Start:  07/08/25    Expected End:  08/22/25            Patient will demonstrate independence in home program for support of progression (Progressing)       Start:  07/08/25    Expected End:  08/22/25               Pain       Patient will report a 2 point reduction in pain while performing reaching. (Progressing)       Start:  07/08/25    Expected End:  08/01/25               Range of Motion       Patient will achieve left shoulder flexion of 180 degrees (Met)       Start:   07/08/25    Expected End:  08/01/25    Resolved:  07/21/25         Patient will achieve left shoulder abduction of 170 degrees (Progressing)       Start:  07/08/25    Expected End:  08/01/25               Strength       Patient will achieve left shoulder flexion strength of 4/5 (Progressing)       Start:  07/08/25    Expected End:  08/01/25            Patient will achieve left shoulder extension strength of 5/5 (Progressing)       Start:  07/08/25    Expected End:  08/01/25            Patient will achieve left shoulder abduction strength of 4+/5 (Progressing)       Start:  07/08/25    Expected End:  08/01/25            Patient will achieve left shoulder external rotation strength of 4-/5 in 90 degrees abd (Progressing)       Start:  07/08/25    Expected End:  08/01/25            Patient will achieve left shoulder internal rotation strength of 4+/5 in 90 degrees abd (Progressing)       Start:  07/08/25    Expected End:  08/01/25                Faye Em, PT, DPT

## 2025-08-11 ENCOUNTER — OFFICE VISIT (OUTPATIENT)
Dept: ORTHOPEDICS | Facility: CLINIC | Age: 50
End: 2025-08-11
Payer: COMMERCIAL

## 2025-08-11 ENCOUNTER — CLINICAL SUPPORT (OUTPATIENT)
Dept: REHABILITATION | Facility: HOSPITAL | Age: 50
End: 2025-08-11
Payer: COMMERCIAL

## 2025-08-11 DIAGNOSIS — M25.612 DECREASED RANGE OF MOTION OF LEFT SHOULDER: Primary | ICD-10-CM

## 2025-08-11 DIAGNOSIS — R29.898 IMPAIRED STRENGTH OF SHOULDER MUSCLES: ICD-10-CM

## 2025-08-11 DIAGNOSIS — M67.912 TENDINOPATHY OF LEFT ROTATOR CUFF: Primary | ICD-10-CM

## 2025-08-11 PROCEDURE — 97112 NEUROMUSCULAR REEDUCATION: CPT | Mod: PO,CQ

## 2025-08-11 PROCEDURE — 99213 OFFICE O/P EST LOW 20 MIN: CPT | Mod: 24,S$GLB,, | Performed by: ORTHOPAEDIC SURGERY

## 2025-08-11 PROCEDURE — 99999 PR PBB SHADOW E&M-EST. PATIENT-LVL I: CPT | Mod: PBBFAC,,, | Performed by: ORTHOPAEDIC SURGERY

## 2025-08-11 PROCEDURE — 4010F ACE/ARB THERAPY RXD/TAKEN: CPT | Mod: CPTII,S$GLB,, | Performed by: ORTHOPAEDIC SURGERY

## 2025-08-11 PROCEDURE — 97140 MANUAL THERAPY 1/> REGIONS: CPT | Mod: PO

## 2025-08-11 PROCEDURE — 3044F HG A1C LEVEL LT 7.0%: CPT | Mod: CPTII,S$GLB,, | Performed by: ORTHOPAEDIC SURGERY

## 2025-08-12 DIAGNOSIS — M25.512 LEFT SHOULDER PAIN, UNSPECIFIED CHRONICITY: Primary | ICD-10-CM

## 2025-08-13 ENCOUNTER — HOSPITAL ENCOUNTER (OUTPATIENT)
Dept: RADIOLOGY | Facility: HOSPITAL | Age: 50
Discharge: HOME | End: 2025-08-13
Attending: ORTHOPAEDIC SURGERY
Payer: COMMERCIAL

## 2025-08-13 ENCOUNTER — CLINICAL SUPPORT (OUTPATIENT)
Dept: REHABILITATION | Facility: HOSPITAL | Age: 50
End: 2025-08-13
Attending: PHYSICAL THERAPIST
Payer: COMMERCIAL

## 2025-08-13 ENCOUNTER — OFFICE VISIT (OUTPATIENT)
Dept: ORTHOPEDICS | Facility: CLINIC | Age: 50
End: 2025-08-13
Payer: COMMERCIAL

## 2025-08-13 VITALS — WEIGHT: 182.75 LBS | HEIGHT: 60 IN | BODY MASS INDEX: 35.88 KG/M2

## 2025-08-13 DIAGNOSIS — R29.898 IMPAIRED STRENGTH OF SHOULDER MUSCLES: ICD-10-CM

## 2025-08-13 DIAGNOSIS — M75.100 ROTATOR CUFF SYNDROME, UNSPECIFIED LATERALITY: Primary | ICD-10-CM

## 2025-08-13 DIAGNOSIS — M25.612 DECREASED RANGE OF MOTION OF LEFT SHOULDER: Primary | ICD-10-CM

## 2025-08-13 DIAGNOSIS — M25.512 LEFT SHOULDER PAIN, UNSPECIFIED CHRONICITY: ICD-10-CM

## 2025-08-13 DIAGNOSIS — M67.912 TENDINOPATHY OF LEFT ROTATOR CUFF: ICD-10-CM

## 2025-08-13 PROCEDURE — 97112 NEUROMUSCULAR REEDUCATION: CPT | Mod: PO | Performed by: PHYSICAL THERAPIST

## 2025-08-13 PROCEDURE — 97110 THERAPEUTIC EXERCISES: CPT | Mod: PO | Performed by: PHYSICAL THERAPIST

## 2025-08-13 PROCEDURE — 4010F ACE/ARB THERAPY RXD/TAKEN: CPT | Mod: CPTII,S$GLB,, | Performed by: ORTHOPAEDIC SURGERY

## 2025-08-13 PROCEDURE — 1159F MED LIST DOCD IN RCRD: CPT | Mod: CPTII,S$GLB,, | Performed by: ORTHOPAEDIC SURGERY

## 2025-08-13 PROCEDURE — 99999 PR PBB SHADOW E&M-EST. PATIENT-LVL III: CPT | Mod: PBBFAC,,, | Performed by: ORTHOPAEDIC SURGERY

## 2025-08-13 PROCEDURE — 3008F BODY MASS INDEX DOCD: CPT | Mod: CPTII,S$GLB,, | Performed by: ORTHOPAEDIC SURGERY

## 2025-08-13 PROCEDURE — 73030 X-RAY EXAM OF SHOULDER: CPT | Mod: 26,LT,, | Performed by: RADIOLOGY

## 2025-08-13 PROCEDURE — 73030 X-RAY EXAM OF SHOULDER: CPT | Mod: TC,PO,LT

## 2025-08-13 PROCEDURE — 3044F HG A1C LEVEL LT 7.0%: CPT | Mod: CPTII,S$GLB,, | Performed by: ORTHOPAEDIC SURGERY

## 2025-08-13 PROCEDURE — 1160F RVW MEDS BY RX/DR IN RCRD: CPT | Mod: CPTII,S$GLB,, | Performed by: ORTHOPAEDIC SURGERY

## 2025-08-13 PROCEDURE — 99204 OFFICE O/P NEW MOD 45 MIN: CPT | Mod: S$GLB,,, | Performed by: ORTHOPAEDIC SURGERY

## 2025-08-21 ENCOUNTER — ANESTHESIA (OUTPATIENT)
Dept: ENDOSCOPY | Facility: HOSPITAL | Age: 50
End: 2025-08-21
Payer: COMMERCIAL

## 2025-08-21 ENCOUNTER — ANESTHESIA EVENT (OUTPATIENT)
Dept: ENDOSCOPY | Facility: HOSPITAL | Age: 50
End: 2025-08-21
Payer: COMMERCIAL

## 2025-08-21 ENCOUNTER — HOSPITAL ENCOUNTER (OUTPATIENT)
Facility: HOSPITAL | Age: 50
Discharge: HOME | End: 2025-08-21
Attending: SURGERY | Admitting: SURGERY
Payer: COMMERCIAL

## 2025-08-21 DIAGNOSIS — Z12.11 ENCOUNTER FOR SCREENING COLONOSCOPY: Primary | ICD-10-CM

## 2025-08-21 LAB
B-HCG UR QL: NEGATIVE
CTP QC/QA: YES

## 2025-08-21 PROCEDURE — 37000009 HC ANESTHESIA EA ADD 15 MINS: Mod: PO | Performed by: SURGERY

## 2025-08-21 PROCEDURE — 63600175 PHARM REV CODE 636 W HCPCS: Mod: PO | Performed by: SURGERY

## 2025-08-21 PROCEDURE — 37000008 HC ANESTHESIA 1ST 15 MINUTES: Mod: PO | Performed by: SURGERY

## 2025-08-21 PROCEDURE — 81025 URINE PREGNANCY TEST: CPT | Mod: PO | Performed by: SURGERY

## 2025-08-21 PROCEDURE — G0121 COLON CA SCRN NOT HI RSK IND: HCPCS | Mod: PO | Performed by: SURGERY

## 2025-08-21 PROCEDURE — 25000003 PHARM REV CODE 250: Mod: PO | Performed by: ANESTHESIOLOGY

## 2025-08-21 PROCEDURE — 63600175 PHARM REV CODE 636 W HCPCS: Mod: PO | Performed by: NURSE ANESTHETIST, CERTIFIED REGISTERED

## 2025-08-21 PROCEDURE — G0121 COLON CA SCRN NOT HI RSK IND: HCPCS | Mod: ,,, | Performed by: SURGERY

## 2025-08-21 RX ORDER — ONDANSETRON 4 MG/1
4 TABLET, ORALLY DISINTEGRATING ORAL ONCE
Status: DISCONTINUED | OUTPATIENT
Start: 2025-08-21 | End: 2025-08-21 | Stop reason: HOSPADM

## 2025-08-21 RX ORDER — SODIUM CHLORIDE, SODIUM LACTATE, POTASSIUM CHLORIDE, CALCIUM CHLORIDE 600; 310; 30; 20 MG/100ML; MG/100ML; MG/100ML; MG/100ML
INJECTION, SOLUTION INTRAVENOUS CONTINUOUS
Status: DISCONTINUED | OUTPATIENT
Start: 2025-08-21 | End: 2025-08-21 | Stop reason: HOSPADM

## 2025-08-21 RX ORDER — ONDANSETRON 8 MG/1
8 TABLET, ORALLY DISINTEGRATING ORAL ONCE
Status: COMPLETED | OUTPATIENT
Start: 2025-08-21 | End: 2025-08-21

## 2025-08-21 RX ORDER — LIDOCAINE HYDROCHLORIDE 20 MG/ML
INJECTION INTRAVENOUS
Status: DISCONTINUED | OUTPATIENT
Start: 2025-08-21 | End: 2025-08-21

## 2025-08-21 RX ORDER — PROPOFOL 10 MG/ML
VIAL (ML) INTRAVENOUS
Status: DISCONTINUED | OUTPATIENT
Start: 2025-08-21 | End: 2025-08-21

## 2025-08-21 RX ADMIN — LIDOCAINE HYDROCHLORIDE 100 MG: 20 INJECTION INTRAVENOUS at 08:08

## 2025-08-21 RX ADMIN — SODIUM CHLORIDE, SODIUM LACTATE, POTASSIUM CHLORIDE, AND CALCIUM CHLORIDE: .6; .31; .03; .02 INJECTION, SOLUTION INTRAVENOUS at 07:08

## 2025-08-21 RX ADMIN — PROPOFOL 100 MG: 10 INJECTION, EMULSION INTRAVENOUS at 08:08

## 2025-08-21 RX ADMIN — PROPOFOL 50 MG: 10 INJECTION, EMULSION INTRAVENOUS at 08:08

## 2025-08-21 RX ADMIN — ONDANSETRON 8 MG: 8 TABLET, ORALLY DISINTEGRATING ORAL at 07:08

## 2025-08-22 VITALS
OXYGEN SATURATION: 99 % | TEMPERATURE: 98 F | SYSTOLIC BLOOD PRESSURE: 130 MMHG | DIASTOLIC BLOOD PRESSURE: 75 MMHG | BODY MASS INDEX: 35.73 KG/M2 | WEIGHT: 182 LBS | HEIGHT: 60 IN | HEART RATE: 75 BPM | RESPIRATION RATE: 16 BRPM

## (undated) DEVICE — SUT ETHILON 4-0 PS2 18 BLK

## (undated) DEVICE — APPLICATOR CHLORAPREP ORN 26ML

## (undated) DEVICE — GLOVE PROTEXIS LTX MICRO  7.5

## (undated) DEVICE — GLOVE SENSICARE PI ALOE 8

## (undated) DEVICE — BANDAGE ELAS SOFTWRAP ST 3X5YD

## (undated) DEVICE — APPLICATOR CHLORAPREP CLR 10.5

## (undated) DEVICE — PAD CAST SPECIALIST STRL 3

## (undated) DEVICE — BLADE SURG #15 CARBON STEEL

## (undated) DEVICE — DRAPE HAND STERILE

## (undated) DEVICE — SPONGE COTTON TRAY 4X4IN

## (undated) DEVICE — STRAP OR TABLE 5IN X 72IN

## (undated) DEVICE — BOWL STERILE LARGE 32OZ

## (undated) DEVICE — FORCEP STRAIGHT DISP

## (undated) DEVICE — DRAPE THREE-QTR REINF 53X77IN

## (undated) DEVICE — DRAPE U SPLIT SHEET 54X76IN

## (undated) DEVICE — SYR 10CC LUER LOCK

## (undated) DEVICE — DRAPE STERI-DRAPE 1000 17X11IN

## (undated) DEVICE — CORD BIPOLAR 12 FOOT

## (undated) DEVICE — DRAPE HALF SURGICAL 40X58IN

## (undated) DEVICE — BAND RUBBER STERILE 1/4X3.5IN

## (undated) DEVICE — TOURNIQUET SB QC DP 18X4IN

## (undated) DEVICE — GLOVE PROTEXIS LTX MICRO 8

## (undated) DEVICE — Device

## (undated) DEVICE — BANDAGE ESMARK LATEX FREE 4INX

## (undated) DEVICE — HANDLE SURG LIGHT NONRIGID

## (undated) DEVICE — NDL 27G X 1 1/4

## (undated) DEVICE — KIT SAHARA DRAPE DRAW/LIFT

## (undated) DEVICE — ALCOHOL 70% ISOP RUBBING 4OZ

## (undated) DEVICE — DRESSING XEROFORM NONADH 1X8IN